# Patient Record
Sex: FEMALE | Race: WHITE | Employment: FULL TIME | URBAN - NONMETROPOLITAN AREA
[De-identification: names, ages, dates, MRNs, and addresses within clinical notes are randomized per-mention and may not be internally consistent; named-entity substitution may affect disease eponyms.]

---

## 2018-03-06 ENCOUNTER — HOSPITAL ENCOUNTER (EMERGENCY)
Age: 38
Discharge: HOME | End: 2018-03-06
Attending: EMERGENCY MEDICINE
Payer: MEDICAID

## 2018-03-06 ENCOUNTER — APPOINTMENT (OUTPATIENT)
Dept: CT IMAGING | Age: 38
End: 2018-03-06
Payer: MEDICAID

## 2018-03-06 VITALS
OXYGEN SATURATION: 97 % | DIASTOLIC BLOOD PRESSURE: 84 MMHG | TEMPERATURE: 98.7 F | HEART RATE: 64 BPM | WEIGHT: 106 LBS | RESPIRATION RATE: 12 BRPM | SYSTOLIC BLOOD PRESSURE: 131 MMHG

## 2018-03-06 DIAGNOSIS — R51.9 ACUTE NONINTRACTABLE HEADACHE, UNSPECIFIED HEADACHE TYPE: ICD-10-CM

## 2018-03-06 DIAGNOSIS — R11.2 NON-INTRACTABLE VOMITING WITH NAUSEA, UNSPECIFIED VOMITING TYPE: ICD-10-CM

## 2018-03-06 DIAGNOSIS — A09 DIARRHEA OF INFECTIOUS ORIGIN: Primary | ICD-10-CM

## 2018-03-06 LAB
-: ABNORMAL
ABSOLUTE EOS #: <0.03 K/UL (ref 0–0.44)
ABSOLUTE IMMATURE GRANULOCYTE: 0.05 K/UL (ref 0–0.3)
ABSOLUTE LYMPH #: 1.37 K/UL (ref 1.1–3.7)
ABSOLUTE MONO #: 0.6 K/UL (ref 0.1–1.2)
ALBUMIN SERPL-MCNC: 4.9 G/DL (ref 3.5–5.2)
ALBUMIN/GLOBULIN RATIO: 1.5 (ref 1–2.5)
ALP BLD-CCNC: 66 U/L (ref 35–104)
ALT SERPL-CCNC: 12 U/L (ref 5–33)
AMORPHOUS: ABNORMAL
AMPHETAMINE SCREEN URINE: NEGATIVE
ANION GAP SERPL CALCULATED.3IONS-SCNC: 15 MMOL/L (ref 9–17)
AST SERPL-CCNC: 23 U/L
BACTERIA: ABNORMAL
BARBITURATE SCREEN URINE: NEGATIVE
BASOPHILS # BLD: 0 % (ref 0–2)
BASOPHILS ABSOLUTE: 0.04 K/UL (ref 0–0.2)
BENZODIAZEPINE SCREEN, URINE: NEGATIVE
BILIRUB SERPL-MCNC: 1.08 MG/DL (ref 0.3–1.2)
BILIRUBIN URINE: NEGATIVE
BUN BLDV-MCNC: 16 MG/DL (ref 6–20)
BUN/CREAT BLD: 35 (ref 9–20)
BUPRENORPHINE URINE: NEGATIVE
CALCIUM SERPL-MCNC: 10.3 MG/DL (ref 8.6–10.4)
CANNABINOID SCREEN URINE: NEGATIVE
CASTS UA: ABNORMAL /LPF
CHLORIDE BLD-SCNC: 103 MMOL/L (ref 98–107)
CO2: 24 MMOL/L (ref 20–31)
COCAINE METABOLITE, URINE: NEGATIVE
COLOR: YELLOW
COMMENT UA: ABNORMAL
CREAT SERPL-MCNC: 0.46 MG/DL (ref 0.5–0.9)
CRYSTALS, UA: ABNORMAL /HPF
DIFFERENTIAL TYPE: ABNORMAL
EOSINOPHILS RELATIVE PERCENT: 0 % (ref 1–4)
EPITHELIAL CELLS UA: ABNORMAL /HPF (ref 0–25)
GFR AFRICAN AMERICAN: >60 ML/MIN
GFR NON-AFRICAN AMERICAN: >60 ML/MIN
GFR SERPL CREATININE-BSD FRML MDRD: ABNORMAL ML/MIN/{1.73_M2}
GFR SERPL CREATININE-BSD FRML MDRD: ABNORMAL ML/MIN/{1.73_M2}
GLUCOSE BLD-MCNC: 121 MG/DL (ref 70–99)
GLUCOSE URINE: NEGATIVE
HCG QUALITATIVE: NEGATIVE
HCT VFR BLD CALC: 50.5 % (ref 36.3–47.1)
HEMOGLOBIN: 16.6 G/DL (ref 11.9–15.1)
IMMATURE GRANULOCYTES: 0 %
KETONES, URINE: NEGATIVE
LEUKOCYTE ESTERASE, URINE: NEGATIVE
LIPASE: 64 U/L (ref 13–60)
LYMPHOCYTES # BLD: 12 % (ref 24–43)
MCH RBC QN AUTO: 28.3 PG (ref 25.2–33.5)
MCHC RBC AUTO-ENTMCNC: 32.9 G/DL (ref 28.4–34.8)
MCV RBC AUTO: 86 FL (ref 82.6–102.9)
MDMA URINE: ABNORMAL
METHADONE SCREEN, URINE: NEGATIVE
METHAMPHETAMINE, URINE: NEGATIVE
MONOCYTES # BLD: 5 % (ref 3–12)
MUCUS: ABNORMAL
NITRITE, URINE: NEGATIVE
NRBC AUTOMATED: 0 PER 100 WBC
OPIATES, URINE: POSITIVE
OTHER OBSERVATIONS UA: ABNORMAL
OXYCODONE SCREEN URINE: NEGATIVE
PDW BLD-RTO: 14.8 % (ref 11.8–14.4)
PH UA: 5.5 (ref 5–9)
PHENCYCLIDINE, URINE: NEGATIVE
PLATELET # BLD: 400 K/UL (ref 138–453)
PLATELET ESTIMATE: ABNORMAL
PMV BLD AUTO: 9.3 FL (ref 8.1–13.5)
POTASSIUM SERPL-SCNC: 3.8 MMOL/L (ref 3.7–5.3)
PROPOXYPHENE, URINE: NEGATIVE
PROTEIN UA: NEGATIVE
RBC # BLD: 5.87 M/UL (ref 3.95–5.11)
RBC # BLD: ABNORMAL 10*6/UL
RBC UA: ABNORMAL /HPF (ref 0–2)
RENAL EPITHELIAL, UA: ABNORMAL /HPF
SEG NEUTROPHILS: 83 % (ref 36–65)
SEGMENTED NEUTROPHILS ABSOLUTE COUNT: 9.77 K/UL (ref 1.5–8.1)
SODIUM BLD-SCNC: 142 MMOL/L (ref 135–144)
SPECIFIC GRAVITY UA: <1.005 (ref 1.01–1.02)
TEST INFORMATION: ABNORMAL
TOTAL PROTEIN: 8.2 G/DL (ref 6.4–8.3)
TRICHOMONAS: ABNORMAL
TRICYCLIC ANTIDEPRESSANTS, UR: NEGATIVE
TURBIDITY: CLEAR
URINE HGB: NEGATIVE
UROBILINOGEN, URINE: NORMAL
WBC # BLD: 11.8 K/UL (ref 3.5–11.3)
WBC # BLD: ABNORMAL 10*3/UL
WBC UA: ABNORMAL /HPF (ref 0–5)
YEAST: ABNORMAL

## 2018-03-06 PROCEDURE — 6360000002 HC RX W HCPCS: Performed by: EMERGENCY MEDICINE

## 2018-03-06 PROCEDURE — 96361 HYDRATE IV INFUSION ADD-ON: CPT

## 2018-03-06 PROCEDURE — 80306 DRUG TEST PRSMV INSTRMNT: CPT

## 2018-03-06 PROCEDURE — 96372 THER/PROPH/DIAG INJ SC/IM: CPT

## 2018-03-06 PROCEDURE — 84703 CHORIONIC GONADOTROPIN ASSAY: CPT

## 2018-03-06 PROCEDURE — 96374 THER/PROPH/DIAG INJ IV PUSH: CPT

## 2018-03-06 PROCEDURE — 74177 CT ABD & PELVIS W/CONTRAST: CPT

## 2018-03-06 PROCEDURE — 83690 ASSAY OF LIPASE: CPT

## 2018-03-06 PROCEDURE — 81001 URINALYSIS AUTO W/SCOPE: CPT

## 2018-03-06 PROCEDURE — 36415 COLL VENOUS BLD VENIPUNCTURE: CPT

## 2018-03-06 PROCEDURE — 6360000004 HC RX CONTRAST MEDICATION: Performed by: EMERGENCY MEDICINE

## 2018-03-06 PROCEDURE — 85025 COMPLETE CBC W/AUTO DIFF WBC: CPT

## 2018-03-06 PROCEDURE — 80053 COMPREHEN METABOLIC PANEL: CPT

## 2018-03-06 PROCEDURE — 2580000003 HC RX 258: Performed by: EMERGENCY MEDICINE

## 2018-03-06 PROCEDURE — 96375 TX/PRO/DX INJ NEW DRUG ADDON: CPT

## 2018-03-06 PROCEDURE — 99284 EMERGENCY DEPT VISIT MOD MDM: CPT

## 2018-03-06 RX ORDER — KETOROLAC TROMETHAMINE 15 MG/ML
15 INJECTION, SOLUTION INTRAMUSCULAR; INTRAVENOUS ONCE
Status: COMPLETED | OUTPATIENT
Start: 2018-03-06 | End: 2018-03-06

## 2018-03-06 RX ORDER — M-VIT,TX,IRON,MINS/CALC/FOLIC 27MG-0.4MG
1 TABLET ORAL DAILY
COMMUNITY

## 2018-03-06 RX ORDER — ORPHENADRINE CITRATE 30 MG/ML
30 INJECTION INTRAMUSCULAR; INTRAVENOUS ONCE
Status: COMPLETED | OUTPATIENT
Start: 2018-03-06 | End: 2018-03-06

## 2018-03-06 RX ORDER — ONDANSETRON 2 MG/ML
4 INJECTION INTRAMUSCULAR; INTRAVENOUS ONCE
Status: COMPLETED | OUTPATIENT
Start: 2018-03-06 | End: 2018-03-06

## 2018-03-06 RX ORDER — DICYCLOMINE HYDROCHLORIDE 10 MG/ML
20 INJECTION INTRAMUSCULAR ONCE
Status: COMPLETED | OUTPATIENT
Start: 2018-03-06 | End: 2018-03-06

## 2018-03-06 RX ORDER — DIPHENHYDRAMINE HYDROCHLORIDE 50 MG/ML
25 INJECTION INTRAMUSCULAR; INTRAVENOUS ONCE
Status: COMPLETED | OUTPATIENT
Start: 2018-03-06 | End: 2018-03-06

## 2018-03-06 RX ORDER — DEXAMETHASONE SODIUM PHOSPHATE 10 MG/ML
6 INJECTION, SOLUTION INTRAMUSCULAR; INTRAVENOUS ONCE
Status: COMPLETED | OUTPATIENT
Start: 2018-03-06 | End: 2018-03-06

## 2018-03-06 RX ADMIN — KETOROLAC TROMETHAMINE 15 MG: 15 INJECTION, SOLUTION INTRAMUSCULAR; INTRAVENOUS at 13:40

## 2018-03-06 RX ADMIN — KETOROLAC TROMETHAMINE 15 MG: 15 INJECTION, SOLUTION INTRAMUSCULAR; INTRAVENOUS at 11:33

## 2018-03-06 RX ADMIN — DICYCLOMINE HYDROCHLORIDE 20 MG: 20 INJECTION, SOLUTION INTRAMUSCULAR at 11:33

## 2018-03-06 RX ADMIN — DIPHENHYDRAMINE HYDROCHLORIDE 25 MG: 50 INJECTION, SOLUTION INTRAMUSCULAR; INTRAVENOUS at 11:33

## 2018-03-06 RX ADMIN — ONDANSETRON 4 MG: 2 INJECTION INTRAMUSCULAR; INTRAVENOUS at 13:40

## 2018-03-06 RX ADMIN — DEXAMETHASONE SODIUM PHOSPHATE 6 MG: 10 INJECTION, SOLUTION INTRAMUSCULAR; INTRAVENOUS at 11:33

## 2018-03-06 RX ADMIN — IOPAMIDOL 100 ML: 612 INJECTION, SOLUTION INTRAVENOUS at 12:52

## 2018-03-06 RX ADMIN — PROCHLORPERAZINE EDISYLATE 10 MG: 5 INJECTION INTRAMUSCULAR; INTRAVENOUS at 11:33

## 2018-03-06 RX ADMIN — SODIUM CHLORIDE 999 ML: 9 INJECTION, SOLUTION INTRAVENOUS at 11:33

## 2018-03-06 RX ADMIN — ORPHENADRINE CITRATE 30 MG: 30 INJECTION INTRAMUSCULAR; INTRAVENOUS at 13:40

## 2018-03-06 ASSESSMENT — PAIN DESCRIPTION - LOCATION
LOCATION: HEAD
LOCATION: ABDOMEN;HEAD

## 2018-03-06 ASSESSMENT — ENCOUNTER SYMPTOMS
VOMITING: 1
SINUS PRESSURE: 1
SORE THROAT: 0
PHOTOPHOBIA: 1
CHOKING: 0
TROUBLE SWALLOWING: 0
ANAL BLEEDING: 0
EYE PAIN: 0
CONSTIPATION: 0
COLOR CHANGE: 0
WHEEZING: 0
COUGH: 0
BLOOD IN STOOL: 0
SHORTNESS OF BREATH: 0
ABDOMINAL PAIN: 1
NAUSEA: 1
SINUS PAIN: 1
DIARRHEA: 1
ABDOMINAL DISTENTION: 0
BACK PAIN: 0
FACIAL SWELLING: 0

## 2018-03-06 ASSESSMENT — PAIN SCALES - GENERAL
PAINLEVEL_OUTOF10: 10
PAINLEVEL_OUTOF10: 3

## 2018-03-06 ASSESSMENT — PAIN DESCRIPTION - DESCRIPTORS: DESCRIPTORS: ACHING;DISCOMFORT

## 2018-03-06 ASSESSMENT — PAIN DESCRIPTION - PAIN TYPE: TYPE: ACUTE PAIN

## 2018-03-06 NOTE — ED PROVIDER NOTES
note were completed with a voice recognition program.  Efforts were made to edit the dictations but occasionally words are mis-transcribed. )       Colleen Mccullough MD  03/06/18 3350

## 2018-03-07 ENCOUNTER — HOSPITAL ENCOUNTER (EMERGENCY)
Age: 38
Discharge: HOME | End: 2018-03-07
Attending: EMERGENCY MEDICINE
Payer: MEDICAID

## 2018-03-07 VITALS
SYSTOLIC BLOOD PRESSURE: 140 MMHG | DIASTOLIC BLOOD PRESSURE: 97 MMHG | HEART RATE: 55 BPM | TEMPERATURE: 99.1 F | OXYGEN SATURATION: 98 % | RESPIRATION RATE: 14 BRPM

## 2018-03-07 DIAGNOSIS — K52.9 GASTROENTERITIS: Primary | ICD-10-CM

## 2018-03-07 PROCEDURE — 96374 THER/PROPH/DIAG INJ IV PUSH: CPT

## 2018-03-07 PROCEDURE — 6360000002 HC RX W HCPCS

## 2018-03-07 PROCEDURE — 6370000000 HC RX 637 (ALT 250 FOR IP): Performed by: EMERGENCY MEDICINE

## 2018-03-07 PROCEDURE — 99283 EMERGENCY DEPT VISIT LOW MDM: CPT

## 2018-03-07 PROCEDURE — 6360000002 HC RX W HCPCS: Performed by: EMERGENCY MEDICINE

## 2018-03-07 PROCEDURE — 96372 THER/PROPH/DIAG INJ SC/IM: CPT

## 2018-03-07 PROCEDURE — 2580000003 HC RX 258: Performed by: EMERGENCY MEDICINE

## 2018-03-07 PROCEDURE — 96375 TX/PRO/DX INJ NEW DRUG ADDON: CPT

## 2018-03-07 RX ORDER — KETOROLAC TROMETHAMINE 15 MG/ML
15 INJECTION, SOLUTION INTRAMUSCULAR; INTRAVENOUS ONCE
Status: COMPLETED | OUTPATIENT
Start: 2018-03-07 | End: 2018-03-07

## 2018-03-07 RX ORDER — DICYCLOMINE HYDROCHLORIDE 10 MG/ML
20 INJECTION INTRAMUSCULAR ONCE
Status: COMPLETED | OUTPATIENT
Start: 2018-03-07 | End: 2018-03-07

## 2018-03-07 RX ORDER — ONDANSETRON 4 MG/1
8 TABLET, ORALLY DISINTEGRATING ORAL ONCE
Status: DISCONTINUED | OUTPATIENT
Start: 2018-03-07 | End: 2018-03-07 | Stop reason: HOSPADM

## 2018-03-07 RX ORDER — DIPHENHYDRAMINE HYDROCHLORIDE 50 MG/ML
25 INJECTION INTRAMUSCULAR; INTRAVENOUS ONCE
Status: COMPLETED | OUTPATIENT
Start: 2018-03-07 | End: 2018-03-07

## 2018-03-07 RX ORDER — LOPERAMIDE HYDROCHLORIDE 2 MG/1
4 CAPSULE ORAL ONCE
Status: COMPLETED | OUTPATIENT
Start: 2018-03-07 | End: 2018-03-07

## 2018-03-07 RX ORDER — ONDANSETRON 4 MG/1
4 TABLET, ORALLY DISINTEGRATING ORAL EVERY 8 HOURS PRN
Qty: 20 TABLET | Refills: 0 | Status: SHIPPED | OUTPATIENT
Start: 2018-03-07 | End: 2018-06-07 | Stop reason: ALTCHOICE

## 2018-03-07 RX ORDER — ONDANSETRON 2 MG/ML
4 INJECTION INTRAMUSCULAR; INTRAVENOUS ONCE
Status: COMPLETED | OUTPATIENT
Start: 2018-03-07 | End: 2018-03-07

## 2018-03-07 RX ORDER — METOCLOPRAMIDE HYDROCHLORIDE 5 MG/ML
10 INJECTION INTRAMUSCULAR; INTRAVENOUS ONCE
Status: COMPLETED | OUTPATIENT
Start: 2018-03-07 | End: 2018-03-07

## 2018-03-07 RX ORDER — DICYCLOMINE HYDROCHLORIDE 10 MG/1
10 CAPSULE ORAL EVERY 6 HOURS PRN
Qty: 20 CAPSULE | Refills: 0 | Status: SHIPPED | OUTPATIENT
Start: 2018-03-07 | End: 2018-06-07 | Stop reason: ALTCHOICE

## 2018-03-07 RX ORDER — SODIUM CHLORIDE, SODIUM LACTATE, POTASSIUM CHLORIDE, CALCIUM CHLORIDE 600; 310; 30; 20 MG/100ML; MG/100ML; MG/100ML; MG/100ML
1000 INJECTION, SOLUTION INTRAVENOUS ONCE
Status: COMPLETED | OUTPATIENT
Start: 2018-03-07 | End: 2018-03-07

## 2018-03-07 RX ORDER — PROMETHAZINE HYDROCHLORIDE 25 MG/ML
12.5 INJECTION, SOLUTION INTRAMUSCULAR; INTRAVENOUS ONCE
Status: COMPLETED | OUTPATIENT
Start: 2018-03-07 | End: 2018-03-07

## 2018-03-07 RX ADMIN — PROMETHAZINE HYDROCHLORIDE 12.5 MG: 25 INJECTION INTRAMUSCULAR; INTRAVENOUS at 03:08

## 2018-03-07 RX ADMIN — DICYCLOMINE HYDROCHLORIDE 20 MG: 20 INJECTION, SOLUTION INTRAMUSCULAR at 02:02

## 2018-03-07 RX ADMIN — LOPERAMIDE HYDROCHLORIDE 4 MG: 2 CAPSULE ORAL at 03:08

## 2018-03-07 RX ADMIN — METOCLOPRAMIDE 10 MG: 5 INJECTION, SOLUTION INTRAMUSCULAR; INTRAVENOUS at 02:03

## 2018-03-07 RX ADMIN — DIPHENHYDRAMINE HYDROCHLORIDE 25 MG: 50 INJECTION, SOLUTION INTRAMUSCULAR; INTRAVENOUS at 02:03

## 2018-03-07 RX ADMIN — ONDANSETRON 4 MG: 2 INJECTION INTRAMUSCULAR; INTRAVENOUS at 02:31

## 2018-03-07 RX ADMIN — SODIUM CHLORIDE, POTASSIUM CHLORIDE, SODIUM LACTATE AND CALCIUM CHLORIDE 1000 ML: 600; 310; 30; 20 INJECTION, SOLUTION INTRAVENOUS at 02:02

## 2018-03-07 RX ADMIN — KETOROLAC TROMETHAMINE 15 MG: 15 INJECTION, SOLUTION INTRAMUSCULAR; INTRAVENOUS at 02:02

## 2018-03-07 ASSESSMENT — ENCOUNTER SYMPTOMS
COUGH: 0
BACK PAIN: 0
SHORTNESS OF BREATH: 0
FACIAL SWELLING: 0
DIARRHEA: 1
VOMITING: 1
ABDOMINAL PAIN: 1
NAUSEA: 1

## 2018-03-07 ASSESSMENT — PAIN DESCRIPTION - LOCATION: LOCATION: ABDOMEN

## 2018-03-07 ASSESSMENT — PAIN SCALES - GENERAL: PAINLEVEL_OUTOF10: 9

## 2018-03-07 ASSESSMENT — PAIN DESCRIPTION - PAIN TYPE: TYPE: ACUTE PAIN

## 2018-03-07 ASSESSMENT — PAIN DESCRIPTION - DESCRIPTORS: DESCRIPTORS: CRAMPING

## 2018-03-07 NOTE — ED PROVIDER NOTES
Cardiovascular: Normal rate and regular rhythm. No murmur heard. Pulmonary/Chest: Effort normal and breath sounds normal. No stridor. No respiratory distress. Abdominal: Soft. She exhibits no distension. Bowel sounds are increased. There is generalized tenderness. There is no rigidity, no rebound, no guarding, no tenderness at McBurney's point and negative Sapp's sign. Musculoskeletal: Normal range of motion. Neurological: She is alert and oriented to person, place, and time. No cranial nerve deficit. Skin: Skin is warm and dry. Nursing note and vitals reviewed. DIAGNOSTIC RESULTS     EKG: All EKG's are interpreted by the Emergency Department Physician who either signs or Co-signs this chart in the absence of a cardiologist.        RADIOLOGY:   Non-plain film images such as CT, Ultrasound and MRI are read by the radiologist. Plain radiographic images are visualized and preliminarily interpreted by the emergency physician with the below findings:        Interpretation per the Radiologist below, if available at the time of this note:    No orders to display         ED BEDSIDE ULTRASOUND:   Performed by ED Physician - none    LABS:  Labs Reviewed - No data to display    All other labs were within normal range or not returned as of this dictation. EMERGENCY DEPARTMENT COURSE and DIFFERENTIAL DIAGNOSIS/MDM:   Vitals:    Vitals:    03/07/18 0137   BP: (!) 140/97   Pulse: 55   Resp: 14   Temp: 99.1 °F (37.3 °C)   TempSrc: Tympanic   SpO2: 98%       The patient has a benign abdomen. I did review her workup from earlier. She has just had persistent symptoms, but no new symptoms. I do not feel that further testing will be of benefit at this time. Patient was given fluids, antiemetics, and antispasmodics. She did have some improvement of her symptoms but had persistent nausea. Her antiemetics were redosed. The patient was finally able to get relief.   I will prescribe Bentyl and Zofran for her for symptom control. She will continue Imodium at home. She continues to have a nontender abdomen. The patient will be discharged. MDM    CRITICAL CARE TIME   Total Critical Care time was minutes, excluding separately reportable procedures. There was a high probability of clinically significant/life threatening deterioration in the patient's condition which required my urgent intervention. CONSULTS:  None    PROCEDURES:  Unless otherwise noted below, none     Procedures    FINAL IMPRESSION      1. Gastroenteritis          DISPOSITION/PLAN   DISPOSITION Decision To Discharge 03/07/2018 03:36:16 AM      PATIENT REFERRED TO:  [facility] MD  [address] [place] [address]  [phone]  In 1 week        DISCHARGE MEDICATIONS:  New Prescriptions    DICYCLOMINE (BENTYL) 10 MG CAPSULE    Take 1 capsule by mouth every 6 hours as needed (cramps)    ONDANSETRON (ZOFRAN ODT) 4 MG DISINTEGRATING TABLET    Take 1 tablet by mouth every 8 hours as needed for Nausea          (Please note that portions of this note were completed with a voice recognition program.  Efforts were made to edit the dictations but occasionally words are mis-transcribed. )    Joanie Boeck, MD (electronically signed)  Attending Emergency Physician          Joanie Boeck, MD  03/07/18 7417

## 2018-04-03 ENCOUNTER — HOSPITAL ENCOUNTER (EMERGENCY)
Age: 38
Discharge: HOME | End: 2018-04-03
Payer: COMMERCIAL

## 2018-04-03 VITALS
HEART RATE: 87 BPM | TEMPERATURE: 97 F | RESPIRATION RATE: 18 BRPM | OXYGEN SATURATION: 97 % | SYSTOLIC BLOOD PRESSURE: 134 MMHG | DIASTOLIC BLOOD PRESSURE: 95 MMHG

## 2018-04-03 DIAGNOSIS — K02.9 DENTAL CARIES: ICD-10-CM

## 2018-04-03 DIAGNOSIS — K04.7 DENTAL ABSCESS: Primary | ICD-10-CM

## 2018-04-03 PROCEDURE — 99282 EMERGENCY DEPT VISIT SF MDM: CPT

## 2018-04-03 RX ORDER — IBUPROFEN 600 MG/1
600 TABLET ORAL EVERY 6 HOURS PRN
Qty: 21 TABLET | Refills: 0 | Status: SHIPPED | OUTPATIENT
Start: 2018-04-03 | End: 2018-08-22 | Stop reason: ALTCHOICE

## 2018-04-03 RX ORDER — PENICILLIN V POTASSIUM 500 MG/1
500 TABLET ORAL 4 TIMES DAILY
Qty: 40 TABLET | Refills: 0 | Status: SHIPPED | OUTPATIENT
Start: 2018-04-03 | End: 2018-04-13

## 2018-04-03 ASSESSMENT — ENCOUNTER SYMPTOMS
CHEST TIGHTNESS: 0
RHINORRHEA: 0
NAUSEA: 0
VOMITING: 0
COUGH: 0
EYE REDNESS: 0
SHORTNESS OF BREATH: 0
WHEEZING: 0
SORE THROAT: 0
BACK PAIN: 0
DIARRHEA: 0
BLOOD IN STOOL: 0
EYE DISCHARGE: 0
ABDOMINAL PAIN: 0
CONSTIPATION: 0

## 2018-04-03 ASSESSMENT — PAIN DESCRIPTION - PAIN TYPE: TYPE: ACUTE PAIN

## 2018-04-03 ASSESSMENT — PAIN DESCRIPTION - LOCATION: LOCATION: TEETH

## 2018-04-03 ASSESSMENT — PAIN SCALES - GENERAL: PAINLEVEL_OUTOF10: 9

## 2018-06-01 ENCOUNTER — HOSPITAL ENCOUNTER (EMERGENCY)
Age: 38
Discharge: HOME | End: 2018-06-01
Attending: EMERGENCY MEDICINE
Payer: COMMERCIAL

## 2018-06-01 VITALS
OXYGEN SATURATION: 98 % | RESPIRATION RATE: 18 BRPM | HEART RATE: 101 BPM | SYSTOLIC BLOOD PRESSURE: 111 MMHG | DIASTOLIC BLOOD PRESSURE: 73 MMHG | TEMPERATURE: 97.9 F | BODY MASS INDEX: 20.83 KG/M2 | WEIGHT: 125 LBS | HEIGHT: 65 IN

## 2018-06-01 DIAGNOSIS — B34.1 COXSACKIE VIRUS INFECTION: ICD-10-CM

## 2018-06-01 DIAGNOSIS — M79.10 MYALGIA: ICD-10-CM

## 2018-06-01 DIAGNOSIS — G43.909 MIGRAINE WITHOUT STATUS MIGRAINOSUS, NOT INTRACTABLE, UNSPECIFIED MIGRAINE TYPE: ICD-10-CM

## 2018-06-01 DIAGNOSIS — J02.9 ACUTE PHARYNGITIS, UNSPECIFIED ETIOLOGY: Primary | ICD-10-CM

## 2018-06-01 LAB
ABSOLUTE EOS #: 0.08 K/UL (ref 0–0.44)
ABSOLUTE IMMATURE GRANULOCYTE: 0 K/UL (ref 0–0.3)
ABSOLUTE LYMPH #: 0.24 K/UL (ref 1.1–3.7)
ABSOLUTE MONO #: 0.08 K/UL (ref 0.1–1.2)
ANION GAP SERPL CALCULATED.3IONS-SCNC: 13 MMOL/L (ref 9–17)
BASOPHILS # BLD: 0 % (ref 0–2)
BASOPHILS ABSOLUTE: 0 K/UL (ref 0–0.2)
BUN BLDV-MCNC: 9 MG/DL (ref 6–20)
BUN/CREAT BLD: 18 (ref 9–20)
CALCIUM SERPL-MCNC: 9.2 MG/DL (ref 8.6–10.4)
CHLORIDE BLD-SCNC: 102 MMOL/L (ref 98–107)
CO2: 23 MMOL/L (ref 20–31)
CREAT SERPL-MCNC: 0.49 MG/DL (ref 0.5–0.9)
DIFFERENTIAL TYPE: ABNORMAL
DIRECT EXAM: NORMAL
EOSINOPHILS RELATIVE PERCENT: 1 % (ref 1–4)
GFR AFRICAN AMERICAN: >60 ML/MIN
GFR NON-AFRICAN AMERICAN: >60 ML/MIN
GFR SERPL CREATININE-BSD FRML MDRD: ABNORMAL ML/MIN/{1.73_M2}
GFR SERPL CREATININE-BSD FRML MDRD: ABNORMAL ML/MIN/{1.73_M2}
GLUCOSE BLD-MCNC: 104 MG/DL (ref 70–99)
HCT VFR BLD CALC: 38.4 % (ref 36.3–47.1)
HEMOGLOBIN: 13.4 G/DL (ref 11.9–15.1)
IMMATURE GRANULOCYTES: 0 %
LYMPHOCYTES # BLD: 3 % (ref 24–43)
Lab: NORMAL
Lab: NORMAL
MCH RBC QN AUTO: 30.7 PG (ref 25.2–33.5)
MCHC RBC AUTO-ENTMCNC: 34.9 G/DL (ref 28.4–34.8)
MCV RBC AUTO: 87.9 FL (ref 82.6–102.9)
MONOCYTES # BLD: 1 % (ref 3–12)
MORPHOLOGY: NORMAL
NRBC AUTOMATED: 0 PER 100 WBC
PDW BLD-RTO: 12.3 % (ref 11.8–14.4)
PLATELET # BLD: 286 K/UL (ref 138–453)
PLATELET ESTIMATE: ABNORMAL
PMV BLD AUTO: 9.1 FL (ref 8.1–13.5)
POTASSIUM SERPL-SCNC: 3.4 MMOL/L (ref 3.7–5.3)
RBC # BLD: 4.37 M/UL (ref 3.95–5.11)
RBC # BLD: ABNORMAL 10*6/UL
SEG NEUTROPHILS: 95 % (ref 36–65)
SEGMENTED NEUTROPHILS ABSOLUTE COUNT: 7.7 K/UL (ref 1.5–8.1)
SODIUM BLD-SCNC: 138 MMOL/L (ref 135–144)
SPECIMEN DESCRIPTION: NORMAL
STATUS: NORMAL
STATUS: NORMAL
WBC # BLD: 8.1 K/UL (ref 3.5–11.3)
WBC # BLD: ABNORMAL 10*3/UL

## 2018-06-01 PROCEDURE — 6360000002 HC RX W HCPCS: Performed by: EMERGENCY MEDICINE

## 2018-06-01 PROCEDURE — 99283 EMERGENCY DEPT VISIT LOW MDM: CPT

## 2018-06-01 PROCEDURE — 80048 BASIC METABOLIC PNL TOTAL CA: CPT

## 2018-06-01 PROCEDURE — 85025 COMPLETE CBC W/AUTO DIFF WBC: CPT

## 2018-06-01 PROCEDURE — 87651 STREP A DNA AMP PROBE: CPT

## 2018-06-01 PROCEDURE — 36415 COLL VENOUS BLD VENIPUNCTURE: CPT

## 2018-06-01 PROCEDURE — 6370000000 HC RX 637 (ALT 250 FOR IP): Performed by: EMERGENCY MEDICINE

## 2018-06-01 PROCEDURE — 2580000003 HC RX 258: Performed by: EMERGENCY MEDICINE

## 2018-06-01 PROCEDURE — 96365 THER/PROPH/DIAG IV INF INIT: CPT

## 2018-06-01 PROCEDURE — 96375 TX/PRO/DX INJ NEW DRUG ADDON: CPT

## 2018-06-01 RX ORDER — MIDAZOLAM HYDROCHLORIDE 2 MG/ML
5 SYRUP ORAL ONCE
Status: DISCONTINUED | OUTPATIENT
Start: 2018-06-01 | End: 2018-06-01

## 2018-06-01 RX ORDER — AZITHROMYCIN 250 MG/1
TABLET, FILM COATED ORAL
Qty: 6 TABLET | Refills: 0 | Status: SHIPPED | OUTPATIENT
Start: 2018-06-01 | End: 2018-06-07 | Stop reason: ALTCHOICE

## 2018-06-01 RX ORDER — METHYLPREDNISOLONE SODIUM SUCCINATE 125 MG/2ML
125 INJECTION, POWDER, LYOPHILIZED, FOR SOLUTION INTRAMUSCULAR; INTRAVENOUS ONCE
Status: COMPLETED | OUTPATIENT
Start: 2018-06-01 | End: 2018-06-01

## 2018-06-01 RX ORDER — KETOROLAC TROMETHAMINE 15 MG/ML
15 INJECTION, SOLUTION INTRAMUSCULAR; INTRAVENOUS ONCE
Status: COMPLETED | OUTPATIENT
Start: 2018-06-01 | End: 2018-06-01

## 2018-06-01 RX ORDER — DIPHENHYDRAMINE HYDROCHLORIDE 50 MG/ML
25 INJECTION INTRAMUSCULAR; INTRAVENOUS ONCE
Status: COMPLETED | OUTPATIENT
Start: 2018-06-01 | End: 2018-06-01

## 2018-06-01 RX ORDER — HYDROCODONE BITARTRATE AND ACETAMINOPHEN 5; 325 MG/1; MG/1
1 TABLET ORAL EVERY 4 HOURS PRN
Qty: 10 TABLET | Refills: 0 | Status: SHIPPED | OUTPATIENT
Start: 2018-06-01 | End: 2018-06-04 | Stop reason: ALTCHOICE

## 2018-06-01 RX ORDER — PREDNISONE 20 MG/1
20 TABLET ORAL 2 TIMES DAILY
Qty: 14 TABLET | Refills: 0 | Status: SHIPPED | OUTPATIENT
Start: 2018-06-01 | End: 2018-06-07

## 2018-06-01 RX ORDER — ACETAMINOPHEN 500 MG
1000 TABLET ORAL ONCE
Status: COMPLETED | OUTPATIENT
Start: 2018-06-01 | End: 2018-06-01

## 2018-06-01 RX ADMIN — PROCHLORPERAZINE EDISYLATE 10 MG: 5 INJECTION INTRAMUSCULAR; INTRAVENOUS at 10:26

## 2018-06-01 RX ADMIN — DIPHENHYDRAMINE HYDROCHLORIDE 25 MG: 50 INJECTION, SOLUTION INTRAMUSCULAR; INTRAVENOUS at 10:29

## 2018-06-01 RX ADMIN — KETOROLAC TROMETHAMINE 15 MG: 15 INJECTION, SOLUTION INTRAMUSCULAR; INTRAVENOUS at 10:28

## 2018-06-01 RX ADMIN — SODIUM CHLORIDE 999 ML: 9 INJECTION, SOLUTION INTRAVENOUS at 10:24

## 2018-06-01 RX ADMIN — AZITHROMYCIN MONOHYDRATE 500 MG: 500 INJECTION, POWDER, LYOPHILIZED, FOR SOLUTION INTRAVENOUS at 10:36

## 2018-06-01 RX ADMIN — ACETAMINOPHEN 1000 MG: 500 TABLET ORAL at 10:22

## 2018-06-01 RX ADMIN — METHYLPREDNISOLONE SODIUM SUCCINATE 125 MG: 125 INJECTION, POWDER, FOR SOLUTION INTRAMUSCULAR; INTRAVENOUS at 10:25

## 2018-06-01 ASSESSMENT — ENCOUNTER SYMPTOMS
COLOR CHANGE: 0
SINUS PRESSURE: 1
SORE THROAT: 1
VOMITING: 0
CONSTIPATION: 0
ABDOMINAL DISTENTION: 0
DIARRHEA: 0
NAUSEA: 0
SHORTNESS OF BREATH: 0
COUGH: 0
ABDOMINAL PAIN: 0

## 2018-06-01 ASSESSMENT — PAIN DESCRIPTION - LOCATION: LOCATION: GENERALIZED

## 2018-06-01 ASSESSMENT — PAIN SCALES - GENERAL
PAINLEVEL_OUTOF10: 6
PAINLEVEL_OUTOF10: 10

## 2018-06-03 ENCOUNTER — HOSPITAL ENCOUNTER (EMERGENCY)
Age: 38
Discharge: HOME | End: 2018-06-03
Attending: EMERGENCY MEDICINE
Payer: COMMERCIAL

## 2018-06-03 VITALS
WEIGHT: 125 LBS | HEIGHT: 65 IN | BODY MASS INDEX: 20.83 KG/M2 | OXYGEN SATURATION: 99 % | RESPIRATION RATE: 16 BRPM | TEMPERATURE: 98.9 F | DIASTOLIC BLOOD PRESSURE: 91 MMHG | SYSTOLIC BLOOD PRESSURE: 139 MMHG | HEART RATE: 82 BPM

## 2018-06-03 DIAGNOSIS — R21 RASH AND OTHER NONSPECIFIC SKIN ERUPTION: Primary | ICD-10-CM

## 2018-06-03 PROCEDURE — 99282 EMERGENCY DEPT VISIT SF MDM: CPT

## 2018-06-03 PROCEDURE — 6360000002 HC RX W HCPCS: Performed by: EMERGENCY MEDICINE

## 2018-06-03 PROCEDURE — 6370000000 HC RX 637 (ALT 250 FOR IP): Performed by: EMERGENCY MEDICINE

## 2018-06-03 PROCEDURE — 96374 THER/PROPH/DIAG INJ IV PUSH: CPT

## 2018-06-03 RX ORDER — HYDROCODONE BITARTRATE AND ACETAMINOPHEN 5; 325 MG/1; MG/1
1 TABLET ORAL ONCE
Status: COMPLETED | OUTPATIENT
Start: 2018-06-03 | End: 2018-06-03

## 2018-06-03 RX ORDER — KETOROLAC TROMETHAMINE 30 MG/ML
30 INJECTION, SOLUTION INTRAMUSCULAR; INTRAVENOUS ONCE
Status: COMPLETED | OUTPATIENT
Start: 2018-06-03 | End: 2018-06-03

## 2018-06-03 RX ADMIN — KETOROLAC TROMETHAMINE 30 MG: 30 INJECTION, SOLUTION INTRAMUSCULAR at 03:43

## 2018-06-03 RX ADMIN — HYDROCODONE BITARTRATE AND ACETAMINOPHEN 1 TABLET: 5; 325 TABLET ORAL at 03:43

## 2018-06-03 ASSESSMENT — PAIN DESCRIPTION - PAIN TYPE: TYPE: ACUTE PAIN

## 2018-06-03 ASSESSMENT — PAIN DESCRIPTION - LOCATION: LOCATION: HAND;FOOT

## 2018-06-03 ASSESSMENT — PAIN SCALES - GENERAL
PAINLEVEL_OUTOF10: 5
PAINLEVEL_OUTOF10: 5
PAINLEVEL_OUTOF10: 0

## 2018-06-03 ASSESSMENT — PAIN DESCRIPTION - ORIENTATION: ORIENTATION: RIGHT;LEFT

## 2018-06-04 ENCOUNTER — OFFICE VISIT (OUTPATIENT)
Dept: PRIMARY CARE CLINIC | Age: 38
End: 2018-06-04
Payer: COMMERCIAL

## 2018-06-04 VITALS
BODY MASS INDEX: 22.13 KG/M2 | SYSTOLIC BLOOD PRESSURE: 115 MMHG | HEIGHT: 65 IN | TEMPERATURE: 98.4 F | RESPIRATION RATE: 14 BRPM | DIASTOLIC BLOOD PRESSURE: 70 MMHG | HEART RATE: 80 BPM | WEIGHT: 132.8 LBS

## 2018-06-04 DIAGNOSIS — B08.4 HAND, FOOT AND MOUTH DISEASE: Primary | ICD-10-CM

## 2018-06-04 PROCEDURE — G8427 DOCREV CUR MEDS BY ELIG CLIN: HCPCS | Performed by: NURSE PRACTITIONER

## 2018-06-04 PROCEDURE — 99202 OFFICE O/P NEW SF 15 MIN: CPT | Performed by: NURSE PRACTITIONER

## 2018-06-04 PROCEDURE — G8420 CALC BMI NORM PARAMETERS: HCPCS | Performed by: NURSE PRACTITIONER

## 2018-06-04 PROCEDURE — 1036F TOBACCO NON-USER: CPT | Performed by: NURSE PRACTITIONER

## 2018-06-04 RX ORDER — HYDROXYZINE HYDROCHLORIDE 25 MG/1
25 TABLET, FILM COATED ORAL 3 TIMES DAILY PRN
Qty: 30 TABLET | Refills: 0 | Status: SHIPPED | OUTPATIENT
Start: 2018-06-04 | End: 2018-06-14

## 2018-06-04 ASSESSMENT — ENCOUNTER SYMPTOMS
COUGH: 0
VOMITING: 0
RHINORRHEA: 0
SHORTNESS OF BREATH: 0
SORE THROAT: 0

## 2018-06-07 ENCOUNTER — OFFICE VISIT (OUTPATIENT)
Dept: PRIMARY CARE CLINIC | Age: 38
End: 2018-06-07
Payer: COMMERCIAL

## 2018-06-07 VITALS
RESPIRATION RATE: 14 BRPM | HEIGHT: 65 IN | HEART RATE: 75 BPM | DIASTOLIC BLOOD PRESSURE: 82 MMHG | WEIGHT: 133.7 LBS | BODY MASS INDEX: 22.28 KG/M2 | TEMPERATURE: 98.4 F | SYSTOLIC BLOOD PRESSURE: 129 MMHG

## 2018-06-07 DIAGNOSIS — B08.4 HAND, FOOT AND MOUTH DISEASE: Primary | ICD-10-CM

## 2018-06-07 PROCEDURE — G8427 DOCREV CUR MEDS BY ELIG CLIN: HCPCS | Performed by: NURSE PRACTITIONER

## 2018-06-07 PROCEDURE — 1036F TOBACCO NON-USER: CPT | Performed by: NURSE PRACTITIONER

## 2018-06-07 PROCEDURE — 99213 OFFICE O/P EST LOW 20 MIN: CPT | Performed by: NURSE PRACTITIONER

## 2018-06-07 PROCEDURE — G8420 CALC BMI NORM PARAMETERS: HCPCS | Performed by: NURSE PRACTITIONER

## 2018-06-07 ASSESSMENT — ENCOUNTER SYMPTOMS
SHORTNESS OF BREATH: 0
COUGH: 0
RHINORRHEA: 0
VOMITING: 0
SORE THROAT: 0

## 2018-07-11 ENCOUNTER — OFFICE VISIT (OUTPATIENT)
Dept: PRIMARY CARE CLINIC | Age: 38
End: 2018-07-11
Payer: COMMERCIAL

## 2018-07-11 VITALS
BODY MASS INDEX: 21.86 KG/M2 | SYSTOLIC BLOOD PRESSURE: 116 MMHG | HEIGHT: 66 IN | TEMPERATURE: 97.7 F | WEIGHT: 136 LBS | DIASTOLIC BLOOD PRESSURE: 75 MMHG | RESPIRATION RATE: 14 BRPM | HEART RATE: 71 BPM

## 2018-07-11 DIAGNOSIS — B09 VIRAL EXANTHEM: Primary | ICD-10-CM

## 2018-07-11 DIAGNOSIS — J02.9 SORETHROAT: ICD-10-CM

## 2018-07-11 LAB — S PYO AG THROAT QL: NORMAL

## 2018-07-11 PROCEDURE — 1036F TOBACCO NON-USER: CPT | Performed by: NURSE PRACTITIONER

## 2018-07-11 PROCEDURE — 87880 STREP A ASSAY W/OPTIC: CPT | Performed by: NURSE PRACTITIONER

## 2018-07-11 PROCEDURE — G8420 CALC BMI NORM PARAMETERS: HCPCS | Performed by: NURSE PRACTITIONER

## 2018-07-11 PROCEDURE — G8427 DOCREV CUR MEDS BY ELIG CLIN: HCPCS | Performed by: NURSE PRACTITIONER

## 2018-07-11 PROCEDURE — 99213 OFFICE O/P EST LOW 20 MIN: CPT | Performed by: NURSE PRACTITIONER

## 2018-07-11 ASSESSMENT — ENCOUNTER SYMPTOMS
RHINORRHEA: 0
SORE THROAT: 1
COUGH: 0
SHORTNESS OF BREATH: 0

## 2018-07-11 NOTE — LETTER
[address] [address]  Phone: [phone]  Fax: [facility], APRN - CNP        July 11, 2018     Patient: Serina Lay   YOB: 1980   Date of Visit: 7/11/2018       To Whom It May Concern: It is my medical opinion that Glenda Brown may return to work on 07/12/2018. If you have any questions or concerns, please don't hesitate to call.     Sincerely,        Shira Hernadez, APRN - CNP

## 2018-08-22 ENCOUNTER — HOSPITAL ENCOUNTER (EMERGENCY)
Age: 38
Discharge: HOME | End: 2018-08-22
Attending: EMERGENCY MEDICINE
Payer: COMMERCIAL

## 2018-08-22 ENCOUNTER — APPOINTMENT (OUTPATIENT)
Dept: GENERAL RADIOLOGY | Age: 38
End: 2018-08-22
Payer: COMMERCIAL

## 2018-08-22 VITALS
RESPIRATION RATE: 20 BRPM | OXYGEN SATURATION: 100 % | BODY MASS INDEX: 21.69 KG/M2 | DIASTOLIC BLOOD PRESSURE: 87 MMHG | HEART RATE: 87 BPM | SYSTOLIC BLOOD PRESSURE: 126 MMHG | WEIGHT: 135 LBS | HEIGHT: 66 IN | TEMPERATURE: 97.9 F

## 2018-08-22 DIAGNOSIS — S93.401A SPRAIN OF RIGHT ANKLE, UNSPECIFIED LIGAMENT, INITIAL ENCOUNTER: Primary | ICD-10-CM

## 2018-08-22 PROCEDURE — 73590 X-RAY EXAM OF LOWER LEG: CPT

## 2018-08-22 PROCEDURE — 73610 X-RAY EXAM OF ANKLE: CPT

## 2018-08-22 PROCEDURE — 99283 EMERGENCY DEPT VISIT LOW MDM: CPT

## 2018-08-22 PROCEDURE — 6370000000 HC RX 637 (ALT 250 FOR IP): Performed by: EMERGENCY MEDICINE

## 2018-08-22 RX ORDER — IBUPROFEN 600 MG/1
600 TABLET ORAL ONCE
Status: COMPLETED | OUTPATIENT
Start: 2018-08-22 | End: 2018-08-22

## 2018-08-22 RX ORDER — IBUPROFEN 600 MG/1
600 TABLET ORAL EVERY 6 HOURS PRN
Qty: 30 TABLET | Refills: 0 | Status: ON HOLD | OUTPATIENT
Start: 2018-08-22 | End: 2021-06-25 | Stop reason: HOSPADM

## 2018-08-22 RX ADMIN — IBUPROFEN 600 MG: 600 TABLET ORAL at 08:26

## 2018-08-22 ASSESSMENT — PAIN DESCRIPTION - PAIN TYPE
TYPE: ACUTE PAIN
TYPE: ACUTE PAIN

## 2018-08-22 ASSESSMENT — PAIN DESCRIPTION - LOCATION
LOCATION: ANKLE
LOCATION: FOOT

## 2018-08-22 ASSESSMENT — PAIN SCALES - GENERAL
PAINLEVEL_OUTOF10: 10
PAINLEVEL_OUTOF10: 7
PAINLEVEL_OUTOF10: 10

## 2018-08-22 ASSESSMENT — PAIN DESCRIPTION - ORIENTATION
ORIENTATION: RIGHT
ORIENTATION: RIGHT

## 2018-08-22 NOTE — ED PROVIDER NOTES
[facility] ED  eMERGENCY dEPARTMENT eNCOUnter      Pt Name: Zhang Mackenzie  MRN: [id number]  [place] 1980  Date of evaluation: 8/22/2018  Provider: Genaro [address]       Chief Complaint   Patient presents with    Ankle Pain     felldown 1 step. right ankle swollen         HISTORY OF PRESENT ILLNESS   (Location/Symptom, Timing/Onset, Context/Setting, Quality, Duration, Modifying Factors, Severity) Note limiting factors. HPI    Zhang Mackenzie is a 30 y.o. female who presents to the emergency department Nontoxic well-appearing female patient states half hour prior to arrival she twisted her right ankle. Ndiaye had no loss of consciousness. She had immediate pain and swelling over distal right fibula. No loss of sensation no numbness or tingling. So she came in the emergency department to be seen for this. No findings of life-threatening emergency. Patient was advised to use ice and ibuprofen and follow-up with primary care doctor    Patient did not hit her head or neck pain no other complaints    Nursing Notes were reviewed. REVIEW OF SYSTEMS    (2+ for level 4; 10+ for level 5)   Review of Systems   systems reviewed and otherwise acutely negative except as in the [address] PAST MEDICAL HISTORY   History reviewed. No pertinent past medical history. SURGICAL HISTORY       Past Surgical History:   Procedure Laterality Date    UPPER GASTROINTESTINAL ENDOSCOPY         CURRENT MEDICATIONS       Previous Medications    MULTIPLE VITAMINS-MINERALS (THERAPEUTIC MULTIVITAMIN-MINERALS) TABLET    Take 1 tablet by mouth daily       ALLERGIES     Ceclor [cefaclor]    FAMILY HISTORY     History reviewed. No pertinent family history.      SOCIAL HISTORY       Social History     Social History    Marital status: Single     Spouse name: N/A    Number of children: N/A    Years of education: N/A     Social History Main Topics    Smoking status: Former Smoker Types: Cigarettes    Smokeless tobacco: Never Used    Alcohol use No    Drug use: No    Sexual activity: Not Asked     Other Topics Concern    None     Social History Narrative    None       SCREENINGS           PHYSICAL EXAM    (up to 7 for level 4, 8 or more for level 5)     ED Triage Vitals [08/22/18 0808]   BP Temp Temp Source Pulse Resp SpO2 Height Weight   126/87 97.9 °F (36.6 °C) Tympanic 87 20 100 % 5' 6\" (1.676 m) 135 lb (61.2 kg)     ED Triage Vitals [08/22/18 0808]   Enc Vitals Group      /87      Pulse 87      Resp 20      Temp 97.9 °F (36.6 °C)      Temp Source Tympanic      SpO2 100 %      Weight 135 lb (61.2 kg)      Height 5' 6\" (1.676 m)      Head Circumference       Peak Flow       Pain Score       Pain Loc       Pain Edu? Excl. in [address]? Physical Exam    GENERAL APPEARANCE: Awake and alert. Cooperative. No acute distress. HEAD: Normocephalic. Atraumatic. EXTREMITIES: Soft tissue swelling over the right lateral malleolus. Tenderness to palpitation. Full range of motion. No obvious gross deformity. No neurovascular deficit. Skin color normal.  No bruising  SKIN: Warm and dry. NEUROLOGICAL: No gross facial drooping. Moves all 4 extremities spontaneously. 2 point sensation intact no neurovascular deficit dorsal pedal posterior pulses are strong  PSYCHIATRIC: Normal mood. DIAGNOSTIC RESULTS     EKG (Per Emergency Physician):       RADIOLOGY (Per Emergency Physician): Interpretation per the Radiologist below, if available at the time of this note:  Xr Tibia Fibula Right (2 Views)    Result Date: 8/22/2018  REPORT: 3 views right ankle and 2 views right tib-fib INDICATION: Pain and swelling to ankle FINDINGS: No acute fracture or dislocation. Ankle mortise is intact and symmetric. The proximal tibia and fibula are intact. No ankle joint effusion. Lateral soft tissue swelling of the ankle.  Final report electronically signed by Rey Wang on 8/22/2018 9:15 note:  Portions of this note were completed with a voice recognition program.  Efforts were made to edit the dictations but occasionally words and phrases are mis-transcribed.)  Form v2016. J.5-cn    DO Priya Rojas (electronically signed)  Emergency Medicine Provider          Francisco Daily DO  08/22/18 0920

## 2018-12-30 ENCOUNTER — HOSPITAL ENCOUNTER (EMERGENCY)
Age: 38
Discharge: HOME | End: 2018-12-30
Attending: EMERGENCY MEDICINE
Payer: COMMERCIAL

## 2018-12-30 VITALS
HEART RATE: 101 BPM | DIASTOLIC BLOOD PRESSURE: 99 MMHG | OXYGEN SATURATION: 99 % | RESPIRATION RATE: 16 BRPM | BODY MASS INDEX: 21.69 KG/M2 | WEIGHT: 135 LBS | SYSTOLIC BLOOD PRESSURE: 161 MMHG | HEIGHT: 66 IN | TEMPERATURE: 96.5 F

## 2018-12-30 DIAGNOSIS — H57.89 IRRITATION OF LEFT EYE: Primary | ICD-10-CM

## 2018-12-30 PROCEDURE — 99282 EMERGENCY DEPT VISIT SF MDM: CPT

## 2018-12-30 PROCEDURE — 6370000000 HC RX 637 (ALT 250 FOR IP): Performed by: EMERGENCY MEDICINE

## 2018-12-30 RX ORDER — KETOROLAC TROMETHAMINE 5 MG/ML
1 SOLUTION OPHTHALMIC ONCE
Status: COMPLETED | OUTPATIENT
Start: 2018-12-30 | End: 2018-12-30

## 2018-12-30 RX ORDER — TETRACAINE HYDROCHLORIDE 5 MG/ML
1 SOLUTION OPHTHALMIC ONCE
Status: COMPLETED | OUTPATIENT
Start: 2018-12-30 | End: 2018-12-30

## 2018-12-30 RX ORDER — ERYTHROMYCIN 5 MG/G
OINTMENT OPHTHALMIC ONCE
Status: COMPLETED | OUTPATIENT
Start: 2018-12-30 | End: 2018-12-30

## 2018-12-30 RX ADMIN — TETRACAINE HYDROCHLORIDE 1 DROP: 5 SOLUTION OPHTHALMIC at 10:00

## 2018-12-30 RX ADMIN — FLUORESCEIN SODIUM 1 MG: 1 STRIP OPHTHALMIC at 10:00

## 2018-12-30 RX ADMIN — KETOROLAC TROMETHAMINE 1 DROP: 5 SOLUTION/ DROPS OPHTHALMIC at 10:30

## 2018-12-30 RX ADMIN — ERYTHROMYCIN: 5 OINTMENT OPHTHALMIC at 10:49

## 2018-12-30 ASSESSMENT — VISUAL ACUITY
OD: 20/25
OS: 20/30
OU: 20/25

## 2018-12-30 ASSESSMENT — PAIN SCALES - GENERAL
PAINLEVEL_OUTOF10: 0
PAINLEVEL_OUTOF10: 9

## 2018-12-30 ASSESSMENT — PAIN DESCRIPTION - ORIENTATION: ORIENTATION: LEFT

## 2018-12-30 ASSESSMENT — PAIN DESCRIPTION - PAIN TYPE: TYPE: ACUTE PAIN

## 2018-12-30 ASSESSMENT — PAIN DESCRIPTION - DESCRIPTORS: DESCRIPTORS: BURNING

## 2018-12-30 ASSESSMENT — PAIN DESCRIPTION - LOCATION: LOCATION: EYE

## 2019-02-28 ENCOUNTER — HOSPITAL ENCOUNTER (EMERGENCY)
Age: 39
Discharge: HOME | End: 2019-02-28
Attending: EMERGENCY MEDICINE
Payer: COMMERCIAL

## 2019-02-28 VITALS — RESPIRATION RATE: 16 BRPM

## 2019-02-28 DIAGNOSIS — H57.89 IRRITATION OF LEFT EYE: ICD-10-CM

## 2019-02-28 DIAGNOSIS — H59.89: Primary | ICD-10-CM

## 2019-02-28 PROCEDURE — 99282 EMERGENCY DEPT VISIT SF MDM: CPT

## 2019-02-28 RX ORDER — OXYCODONE AND ACETAMINOPHEN 7.5; 325 MG/1; MG/1
1 TABLET ORAL EVERY 6 HOURS PRN
Qty: 10 TABLET | Refills: 0 | Status: SHIPPED | OUTPATIENT
Start: 2019-02-28 | End: 2019-03-03

## 2019-02-28 ASSESSMENT — ENCOUNTER SYMPTOMS
EYE PAIN: 1
PHOTOPHOBIA: 1
EYE REDNESS: 1
COLOR CHANGE: 0
FACIAL SWELLING: 0

## 2019-02-28 ASSESSMENT — PAIN DESCRIPTION - ORIENTATION: ORIENTATION: LEFT

## 2019-02-28 ASSESSMENT — PAIN DESCRIPTION - LOCATION: LOCATION: EYE

## 2019-02-28 ASSESSMENT — PAIN SCALES - GENERAL: PAINLEVEL_OUTOF10: 6

## 2019-02-28 ASSESSMENT — PAIN DESCRIPTION - PAIN TYPE: TYPE: ACUTE PAIN

## 2019-03-04 ENCOUNTER — TELEPHONE (OUTPATIENT)
Dept: PRIMARY CARE CLINIC | Age: 39
End: 2019-03-04

## 2019-08-09 ENCOUNTER — HOSPITAL ENCOUNTER (EMERGENCY)
Age: 39
Discharge: HOME | End: 2019-08-09
Attending: EMERGENCY MEDICINE
Payer: COMMERCIAL

## 2019-08-09 VITALS
TEMPERATURE: 98.6 F | SYSTOLIC BLOOD PRESSURE: 124 MMHG | HEART RATE: 74 BPM | RESPIRATION RATE: 18 BRPM | DIASTOLIC BLOOD PRESSURE: 80 MMHG | OXYGEN SATURATION: 100 %

## 2019-08-09 DIAGNOSIS — K02.9 PAIN DUE TO DENTAL CARIES: Primary | ICD-10-CM

## 2019-08-09 PROCEDURE — 6360000002 HC RX W HCPCS: Performed by: EMERGENCY MEDICINE

## 2019-08-09 PROCEDURE — 6370000000 HC RX 637 (ALT 250 FOR IP): Performed by: EMERGENCY MEDICINE

## 2019-08-09 PROCEDURE — 96372 THER/PROPH/DIAG INJ SC/IM: CPT

## 2019-08-09 PROCEDURE — 99282 EMERGENCY DEPT VISIT SF MDM: CPT

## 2019-08-09 RX ORDER — CLINDAMYCIN HYDROCHLORIDE 150 MG/1
300 CAPSULE ORAL ONCE
Status: COMPLETED | OUTPATIENT
Start: 2019-08-09 | End: 2019-08-09

## 2019-08-09 RX ORDER — IBUPROFEN 800 MG/1
800 TABLET ORAL EVERY 8 HOURS PRN
Qty: 30 TABLET | Refills: 0 | Status: SHIPPED | OUTPATIENT
Start: 2019-08-09 | End: 2021-03-09

## 2019-08-09 RX ORDER — KETOROLAC TROMETHAMINE 15 MG/ML
15 INJECTION, SOLUTION INTRAMUSCULAR; INTRAVENOUS ONCE
Status: COMPLETED | OUTPATIENT
Start: 2019-08-09 | End: 2019-08-09

## 2019-08-09 RX ORDER — ACETAMINOPHEN 500 MG
1000 TABLET ORAL ONCE
Status: COMPLETED | OUTPATIENT
Start: 2019-08-09 | End: 2019-08-09

## 2019-08-09 RX ORDER — ACETAMINOPHEN 325 MG/1
650 TABLET ORAL EVERY 6 HOURS PRN
Qty: 30 TABLET | Refills: 0 | Status: SHIPPED | OUTPATIENT
Start: 2019-08-09 | End: 2021-03-09 | Stop reason: ALTCHOICE

## 2019-08-09 RX ORDER — CLINDAMYCIN HYDROCHLORIDE 300 MG/1
300 CAPSULE ORAL 3 TIMES DAILY
Qty: 21 CAPSULE | Refills: 0 | Status: SHIPPED | OUTPATIENT
Start: 2019-08-09 | End: 2019-08-16

## 2019-08-09 RX ADMIN — KETOROLAC TROMETHAMINE 15 MG: 15 INJECTION, SOLUTION INTRAMUSCULAR; INTRAVENOUS at 07:44

## 2019-08-09 RX ADMIN — CLINDAMYCIN HYDROCHLORIDE 300 MG: 150 CAPSULE ORAL at 07:44

## 2019-08-09 RX ADMIN — ACETAMINOPHEN 1000 MG: 500 TABLET, FILM COATED ORAL at 07:44

## 2019-08-09 ASSESSMENT — ENCOUNTER SYMPTOMS
TROUBLE SWALLOWING: 0
WHEEZING: 0
ABDOMINAL PAIN: 0
VOICE CHANGE: 0
NAUSEA: 0
SHORTNESS OF BREATH: 0
COLOR CHANGE: 0
VOMITING: 0

## 2019-08-09 ASSESSMENT — PAIN DESCRIPTION - FREQUENCY: FREQUENCY: CONTINUOUS

## 2019-08-09 ASSESSMENT — PAIN DESCRIPTION - ORIENTATION: ORIENTATION: LEFT;UPPER;LOWER

## 2019-08-09 ASSESSMENT — PAIN SCALES - GENERAL
PAINLEVEL_OUTOF10: 10
PAINLEVEL_OUTOF10: 9
PAINLEVEL_OUTOF10: 10

## 2019-08-09 ASSESSMENT — PAIN DESCRIPTION - LOCATION: LOCATION: TEETH

## 2019-08-09 ASSESSMENT — PAIN DESCRIPTION - DESCRIPTORS: DESCRIPTORS: CONSTANT;ACHING

## 2019-08-09 ASSESSMENT — PAIN DESCRIPTION - PAIN TYPE: TYPE: ACUTE PAIN

## 2019-08-09 NOTE — ED PROVIDER NOTES
[facility] ED  Emergency Department Encounter  EmergencyMedicine Attending     Pt Name:Isa Gaitan  MRN: [id number]  [place] 1980  Date of evaluation: 8/9/19  PCP:  Belen Hairston [address] [place], [place] [address]       Chief Complaint   Patient presents with    Dental Pain     left upper and rt lower tooth pain . Pain since monday       HISTORY OF PRESENT ILLNESS  (Location/Symptom, Timing/Onset, Context/Setting, Quality, Duration, Modifying Factors, Severity.)      Michelle Orta is a 41 y.o. female who presents with left-sided lower and upper dental pain. Patient says that she has a history of really poor dentition says that on and off she gets dental infections. ,  Comes in complaining of significant pain over the left lower in the left upper side of the jaw. No new difficulty with swallowing, tolerating p.o. intake,  no difficulty with breathing, no fevers or chills. Patient also would like a work note as she took off from work due to the dental pain today. Has not taken anything for pain so far. Reports pain with swallowing over her dentition but able to swallow. No changes in her voice. No trismus. PAST MEDICAL / SURGICAL / SOCIAL / FAMILY HISTORY      has no past medical history on file. has a past surgical history that includes Upper gastrointestinal endoscopy. Social History     Socioeconomic History    Marital status: Single     Spouse name: Not on file    Number of children: Not on file    Years of education: Not on file    Highest education level: Not on file   Occupational History    Not on file   Social Needs    Financial resource strain: Not on file    Food insecurity:     Worry: Not on file     Inability: Not on file    Transportation needs:     Medical: Not on file     Non-medical: Not on file   Tobacco Use    Smoking status: Former Smoker     Types: Cigarettes    Smokeless tobacco: Never Used   Substance and Sexual Activity    Alcohol use:  No 325 MG TABLET    Take 2 tablets by mouth every 6 hours as needed for Pain    BENZOCAINE (ORAJEL) 10 % MUCOSAL GEL    Take by mouth as needed.     CLINDAMYCIN (CLEOCIN) 300 MG CAPSULE    Take 1 capsule by mouth 3 times daily for 7 days    IBUPROFEN (ADVIL;MOTRIN) 800 MG TABLET    Take 1 tablet by mouth every 8 hours as needed for Pain       Sonny Garner MD  Emergency Medicine Attending    (Please note that portions of thisnote were completed with a voice recognition program.  Efforts were made to edit the dictations but occasionally words are mis-transcribed.)       Sonny Garner MD  08/09/19 5697

## 2019-08-10 ENCOUNTER — HOSPITAL ENCOUNTER (EMERGENCY)
Age: 39
Discharge: HOME | End: 2019-08-10
Payer: COMMERCIAL

## 2019-08-10 PROCEDURE — 4500000002 HC ER NO CHARGE

## 2019-08-12 ENCOUNTER — HOSPITAL ENCOUNTER (EMERGENCY)
Age: 39
Discharge: HOME | End: 2019-08-12
Attending: EMERGENCY MEDICINE
Payer: COMMERCIAL

## 2019-08-12 VITALS
HEART RATE: 103 BPM | DIASTOLIC BLOOD PRESSURE: 99 MMHG | BODY MASS INDEX: 23.4 KG/M2 | WEIGHT: 145 LBS | TEMPERATURE: 99.8 F | OXYGEN SATURATION: 97 % | SYSTOLIC BLOOD PRESSURE: 150 MMHG | RESPIRATION RATE: 16 BRPM

## 2019-08-12 DIAGNOSIS — K04.7 DENTAL ABSCESS: Primary | ICD-10-CM

## 2019-08-12 PROCEDURE — 6370000000 HC RX 637 (ALT 250 FOR IP): Performed by: PHYSICIAN ASSISTANT

## 2019-08-12 PROCEDURE — 99282 EMERGENCY DEPT VISIT SF MDM: CPT

## 2019-08-12 RX ORDER — LIDOCAINE HYDROCHLORIDE 20 MG/ML
15 SOLUTION OROPHARYNGEAL
Status: DISCONTINUED | OUTPATIENT
Start: 2019-08-12 | End: 2019-08-12 | Stop reason: HOSPADM

## 2019-08-12 RX ADMIN — BENZOCAINE, BUTAMBEN, AND TETRACAINE HYDROCHLORIDE 1 SPRAY: .028; .004; .004 AEROSOL, SPRAY TOPICAL at 16:07

## 2019-08-12 RX ADMIN — LIDOCAINE HYDROCHLORIDE 15 ML: 20 SOLUTION ORAL; TOPICAL at 16:07

## 2019-08-12 ASSESSMENT — ENCOUNTER SYMPTOMS
WHEEZING: 0
COUGH: 0
DIARRHEA: 0
RHINORRHEA: 0
SHORTNESS OF BREATH: 0
EYE DISCHARGE: 0
NAUSEA: 0
ABDOMINAL PAIN: 0
EYE REDNESS: 0
CONSTIPATION: 0
SORE THROAT: 0
BACK PAIN: 0
BLOOD IN STOOL: 0
CHEST TIGHTNESS: 0
VOMITING: 0

## 2019-08-12 ASSESSMENT — PAIN DESCRIPTION - LOCATION: LOCATION: MOUTH

## 2019-08-12 ASSESSMENT — PAIN SCALES - GENERAL
PAINLEVEL_OUTOF10: 10
PAINLEVEL_OUTOF10: 10

## 2019-08-12 ASSESSMENT — PAIN DESCRIPTION - PAIN TYPE: TYPE: ACUTE PAIN

## 2019-08-12 ASSESSMENT — PAIN - FUNCTIONAL ASSESSMENT: PAIN_FUNCTIONAL_ASSESSMENT: 0-10

## 2019-08-13 ENCOUNTER — TELEPHONE (OUTPATIENT)
Dept: PRIMARY CARE CLINIC | Age: 39
End: 2019-08-13

## 2019-08-13 NOTE — TELEPHONE ENCOUNTER
[facility] Transitions Initial Follow Up Call    Outreach made within 2 business days of discharge: Yes    Patient: Mabel Avitia Patient : 1980   MRN: [id number]  Reason for Admission: There are no discharge diagnoses documented for the most recent discharge. Discharge Date: 19       Spoke with:  Patient    Discharge department/facility: [facility] ER    TCM Interactive Patient Contact:  Was patient able to fill all prescriptions: Yes  Was patient instructed to bring all medications to the follow-up visit: Patient saw Oral Surgeon and feels better and does not wish to make a follow up appt here. Is patient taking all medications as directed in the discharge summary? Yes  Does patient understand their discharge instructions: Yes  Does patient have questions or concerns that need addressed prior to 7-14 day follow up office visit: no    Scheduled appointment with PCP within 7-14 days    Follow Up  No future appointments.     [facility]

## 2019-08-13 NOTE — ED PROVIDER NOTES
sterile fashion a 22-gauge needle was inserted after anesthetic was provided. There is no purulent drainage able to be expressed at this time discussed with my attending who agrees with plan to not proceed further and to have her continue follow-up tomorrow morning. Airway is patent there is no evidence of Maximus's no floor mouth abscess. She will follow-up as instructed otherwise return immediately to the ER with any new or worsening complaints. Procedures    FINAL IMPRESSION      1. Dental abscess        DISPOSITION/PLAN   DISPOSITION Decision To Discharge 08/12/2019 04:00:14 PM      PATIENT REFERRED TO:  [facility] ED  [address] [phone]    If symptoms worsen, As needed      DISCHARGE MEDICATIONS:  Discharge Medication List as of 8/12/2019  4:02 PM                 Summation      Patient Course:      ED Medications administered this visit:    Medications   butamben-tetracaine-benzocaine (CETACAINE) spray 1 spray (1 spray Topical Given 8/12/19 3377)       New Prescriptions from this visit:    Discharge Medication List as of 8/12/2019  4:02 PM          Follow-up:  [facility] ED  [address] [phone]    If symptoms worsen, As needed        Final Impression:   1.  Dental abscess               (Please note that portions of this note were completed with a voice recognition program.  Efforts were made to edit the dictations but occasionally words are mis-transcribed.)           Kyler Brown PA-C  08/12/19 3868

## 2019-11-05 ENCOUNTER — OFFICE VISIT (OUTPATIENT)
Dept: PRIMARY CARE CLINIC | Age: 39
End: 2019-11-05
Payer: COMMERCIAL

## 2019-11-05 VITALS — HEART RATE: 89 BPM | SYSTOLIC BLOOD PRESSURE: 126 MMHG | DIASTOLIC BLOOD PRESSURE: 86 MMHG | TEMPERATURE: 98.1 F

## 2019-11-05 DIAGNOSIS — R51.9 SINUS HEADACHE: ICD-10-CM

## 2019-11-05 DIAGNOSIS — J30.1 SEASONAL ALLERGIC RHINITIS DUE TO POLLEN: Primary | ICD-10-CM

## 2019-11-05 PROCEDURE — G8484 FLU IMMUNIZE NO ADMIN: HCPCS | Performed by: NURSE PRACTITIONER

## 2019-11-05 PROCEDURE — G8427 DOCREV CUR MEDS BY ELIG CLIN: HCPCS | Performed by: NURSE PRACTITIONER

## 2019-11-05 PROCEDURE — 99214 OFFICE O/P EST MOD 30 MIN: CPT | Performed by: NURSE PRACTITIONER

## 2019-11-05 PROCEDURE — 1036F TOBACCO NON-USER: CPT | Performed by: NURSE PRACTITIONER

## 2019-11-05 PROCEDURE — G8420 CALC BMI NORM PARAMETERS: HCPCS | Performed by: NURSE PRACTITIONER

## 2019-11-05 RX ORDER — MONTELUKAST SODIUM 10 MG/1
10 TABLET ORAL DAILY
Qty: 30 TABLET | Refills: 0 | Status: SHIPPED | OUTPATIENT
Start: 2019-11-05 | End: 2021-06-11

## 2019-11-05 RX ORDER — KETOROLAC TROMETHAMINE 30 MG/ML
30 INJECTION, SOLUTION INTRAMUSCULAR; INTRAVENOUS ONCE
Status: COMPLETED | OUTPATIENT
Start: 2019-11-05 | End: 2019-11-05

## 2019-11-05 RX ORDER — TRIAMCINOLONE ACETONIDE 40 MG/ML
40 INJECTION, SUSPENSION INTRA-ARTICULAR; INTRAMUSCULAR ONCE
Status: COMPLETED | OUTPATIENT
Start: 2019-11-05 | End: 2019-11-05

## 2019-11-05 RX ADMIN — KETOROLAC TROMETHAMINE 30 MG: 30 INJECTION, SOLUTION INTRAMUSCULAR; INTRAVENOUS at 11:18

## 2019-11-05 RX ADMIN — TRIAMCINOLONE ACETONIDE 40 MG: 40 INJECTION, SUSPENSION INTRA-ARTICULAR; INTRAMUSCULAR at 11:19

## 2019-11-05 ASSESSMENT — ENCOUNTER SYMPTOMS
SWOLLEN GLANDS: 0
ABDOMINAL PAIN: 0
COUGH: 1
DIARRHEA: 0
RHINORRHEA: 1
EYE PAIN: 0
PHOTOPHOBIA: 0
SORE THROAT: 1
EYE REDNESS: 0
SINUS PAIN: 1
SINUS PRESSURE: 1
VOMITING: 0
NAUSEA: 0
FACIAL SWEATING: 0
SCALP TENDERNESS: 0
WHEEZING: 0
EYE WATERING: 0

## 2021-03-09 ENCOUNTER — OFFICE VISIT (OUTPATIENT)
Dept: PRIMARY CARE CLINIC | Age: 41
End: 2021-03-09
Payer: COMMERCIAL

## 2021-03-09 VITALS
SYSTOLIC BLOOD PRESSURE: 126 MMHG | DIASTOLIC BLOOD PRESSURE: 62 MMHG | HEIGHT: 66 IN | OXYGEN SATURATION: 99 % | WEIGHT: 178 LBS | HEART RATE: 105 BPM | BODY MASS INDEX: 28.61 KG/M2 | TEMPERATURE: 97.5 F | RESPIRATION RATE: 18 BRPM

## 2021-03-09 DIAGNOSIS — Z13.220 LIPID SCREENING: ICD-10-CM

## 2021-03-09 DIAGNOSIS — N94.6 PAINFUL MENSTRUAL PERIODS: ICD-10-CM

## 2021-03-09 DIAGNOSIS — N92.0 MENORRHAGIA WITH REGULAR CYCLE: Primary | ICD-10-CM

## 2021-03-09 DIAGNOSIS — R63.5 WEIGHT GAIN: ICD-10-CM

## 2021-03-09 PROCEDURE — 99214 OFFICE O/P EST MOD 30 MIN: CPT | Performed by: NURSE PRACTITIONER

## 2021-03-09 PROCEDURE — G8427 DOCREV CUR MEDS BY ELIG CLIN: HCPCS | Performed by: NURSE PRACTITIONER

## 2021-03-09 PROCEDURE — G8484 FLU IMMUNIZE NO ADMIN: HCPCS | Performed by: NURSE PRACTITIONER

## 2021-03-09 PROCEDURE — 1036F TOBACCO NON-USER: CPT | Performed by: NURSE PRACTITIONER

## 2021-03-09 PROCEDURE — G8419 CALC BMI OUT NRM PARAM NOF/U: HCPCS | Performed by: NURSE PRACTITIONER

## 2021-03-09 ASSESSMENT — ENCOUNTER SYMPTOMS
SORE THROAT: 0
COUGH: 0
CONSTIPATION: 0
NAUSEA: 0
WHEEZING: 0
RHINORRHEA: 0
SHORTNESS OF BREATH: 0
ABDOMINAL PAIN: 0
VOMITING: 0
DIARRHEA: 0

## 2021-03-09 ASSESSMENT — PATIENT HEALTH QUESTIONNAIRE - PHQ9
2. FEELING DOWN, DEPRESSED OR HOPELESS: 0
1. LITTLE INTEREST OR PLEASURE IN DOING THINGS: 0
SUM OF ALL RESPONSES TO PHQ9 QUESTIONS 1 & 2: 0
SUM OF ALL RESPONSES TO PHQ QUESTIONS 1-9: 0

## 2021-03-09 NOTE — PATIENT INSTRUCTIONS
SURVEY:    You may be receiving a survey from [employer] regarding your visit today. Please complete the survey to enable us to provide the highest quality of care to you and your family. If you cannot score us a very good on any question, please call the office to discuss how we could have made your experience a very good one. Thank you.

## 2021-03-09 NOTE — PROGRESS NOTES
Name: Martell Magana  : 1980         Chief Complaint:     Chief Complaint   Patient presents with   Purificacion 1076 seen 3 years Vaibhav,weight loss discuss       History of Present Illness:      Martell Magana is a 47 y.o.  female who presents with Establish Care (Last seen 3 years Vaibhav,weight loss discuss)      Vannessa Salcedo is here today to establish care. She has been seen in this office previously for walk in care. Menorrhagia/menstrual painpatient states she has a longstanding history of very heavy periods with significant pain. She is looking for a local OB/GYN in the area. She does believe that this problem is causing her to gain weight. Weight gainsteady over the last 3 years. Patient states she has gained approximately 50 pounds. She states she is a vegetarian and exercises daily. She cannot understand why she is gaining weight. Other  This is a chronic (WEIGHT GAIN) problem. The current episode started more than 1 year ago. The problem occurs constantly. The problem has been unchanged. Pertinent negatives include no abdominal pain, chest pain, chills, congestion, coughing, fatigue, fever, headaches, nausea, neck pain, rash, sore throat or vomiting. Nothing aggravates the symptoms. Treatments tried: DIET AND EXERCISE. The treatment provided no relief. Past Medical History:     History reviewed. No pertinent past medical history. Reviewed all health maintenance requirements and ordered appropriate tests  There are no preventive care reminders to display for this patient. Past Surgical History:     Past Surgical History:   Procedure Laterality Date    UPPER GASTROINTESTINAL ENDOSCOPY          Medications:       Prior to Admission medications    Medication Sig Start Date End Date Taking?  Authorizing Provider   montelukast (SINGULAIR) 10 MG tablet Take 1 tablet by mouth daily  Patient taking differently: Take 10 mg by mouth as needed  19  Yes Kourtney Gloss Might, APRN - CNP   ibuprofen (IBU) 600 MG tablet Take 1 tablet by mouth every 6 hours as needed for Pain With food or milk 8/22/18  Yes Pietro Weaver, DO   Multiple Vitamins-Minerals (THERAPEUTIC MULTIVITAMIN-MINERALS) tablet Take 1 tablet by mouth daily   Yes Historical Provider, MD        Allergies:       Ceclor [cefaclor]    Social History:     Tobacco:    reports that she quit smoking about 6 years ago. Her smoking use included cigarettes. She has never used smokeless tobacco.  Alcohol:      reports no history of alcohol use. Drug Use:  reports no history of drug use. Family History:     Family History   Problem Relation Age of Onset    Cancer Father     Hypertension Father     Heart Disease Father        Review of Systems:     Positive and Negative as described in HPI    Review of Systems   Constitutional: Negative for chills, fatigue and fever. HENT: Negative for congestion, rhinorrhea and sore throat. Eyes: Negative for visual disturbance. Respiratory: Negative for cough, shortness of breath and wheezing. Cardiovascular: Negative for chest pain and palpitations. Gastrointestinal: Negative for abdominal pain, constipation, diarrhea, nausea and vomiting. Genitourinary: Positive for menstrual problem. Negative for difficulty urinating and dysuria. Musculoskeletal: Negative for gait problem, neck pain and neck stiffness. Skin: Negative for rash. Neurological: Negative for dizziness, syncope, light-headedness and headaches. Physical Exam:   Vitals:  /62 (Site: Left Upper Arm)   Pulse 105   Temp 97.5 °F (36.4 °C) (Temporal)   Resp 18   Ht 5' 6\" (1.676 m)   Wt 178 lb (80.7 kg)   SpO2 99%   BMI 28.73 kg/m²     Physical Exam  Constitutional:       General: She is not in acute distress. Appearance: Normal appearance. She is not ill-appearing. HENT:      Mouth/Throat:      Mouth: Mucous membranes are moist.   Eyes:      General: No scleral icterus. Conjunctiva/sclera: Conjunctivae normal.   Neck:      Musculoskeletal: Normal range of motion and neck supple. Vascular: No carotid bruit. Cardiovascular:      Rate and Rhythm: Normal rate and regular rhythm. Heart sounds: No murmur. Pulmonary:      Effort: Pulmonary effort is normal.      Breath sounds: Normal breath sounds. Abdominal:      General: Bowel sounds are normal. There is no distension. Palpations: Abdomen is soft. Tenderness: There is no abdominal tenderness. Musculoskeletal:      Right lower leg: No edema. Left lower leg: No edema. Lymphadenopathy:      Cervical: No cervical adenopathy. Skin:     General: Skin is warm and dry. Findings: No rash. Neurological:      Mental Status: She is alert and oriented to person, place, and time. Psychiatric:         Mood and Affect: Mood normal.         Behavior: Behavior normal.         Data:     Lab Results   Component Value Date     06/01/2018    K 3.4 06/01/2018     06/01/2018    CO2 23 06/01/2018    BUN 9 06/01/2018    CREATININE 0.49 06/01/2018    GLUCOSE 104 06/01/2018    PROT 8.2 03/06/2018    LABALBU 4.9 03/06/2018    BILITOT 1.08 03/06/2018    ALKPHOS 66 03/06/2018    AST 23 03/06/2018    ALT 12 03/06/2018     Lab Results   Component Value Date    WBC 8.1 06/01/2018    RBC 4.37 06/01/2018    HGB 13.4 06/01/2018    HCT 38.4 06/01/2018    MCV 87.9 06/01/2018    MCH 30.7 06/01/2018    MCHC 34.9 06/01/2018    RDW 12.3 06/01/2018     06/01/2018    MPV 9.1 06/01/2018     No results found for: TSH  No results found for: CHOL, HDL, PSA, LABA1C    Assessment/Plan:      Diagnosis Orders   1. Menorrhagia with regular cycle  CBC Auto Differential    Comprehensive Metabolic Panel    Iron and TIBC    Vitamin B12    T4, Free    TSH without Reflex    Araceli Borges, Carlos Fenton CNM, OB/GYN, [place]   2. Painful menstrual periods  [address], Carlos Fenton CNM, OB/GYN, [place]   3. Weight gain     4.  Lipid screening Lipid Panel     We will obtain labs to rule out any metabolic causes of her weight gain. We will refer her to OB/GYN for further evaluation. Follow-up in 2 weeks with results. 1.  Samantha Larson received counseling on the following healthy behaviors: nutrition, exercise and medication adherence  2. Patient given educational materials - see patient instructions  3. Was a self-tracking handout given in paper form or via [employer] your [employer]? No  If yes, see orders or list here. 4.  Discussed use, benefit, and side effects of prescribed medications. Barriers to medication compliance addressed. All patient questions answered. Pt voiced understanding. 5.  Reviewed prior labs and health maintenance  6. Continue current medications, diet and exercise. Completed Refills   Requested Prescriptions      No prescriptions requested or ordered in this encounter         Return in about 2 weeks (around 3/23/2021) for Recheck.

## 2021-03-10 ENCOUNTER — HOSPITAL ENCOUNTER (OUTPATIENT)
Age: 41
Discharge: HOME | End: 2021-03-10
Payer: COMMERCIAL

## 2021-03-10 DIAGNOSIS — Z13.220 LIPID SCREENING: ICD-10-CM

## 2021-03-10 DIAGNOSIS — N92.0 MENORRHAGIA WITH REGULAR CYCLE: ICD-10-CM

## 2021-03-10 LAB
ABSOLUTE EOS #: 0.5 K/UL (ref 0–0.44)
ABSOLUTE IMMATURE GRANULOCYTE: 0.03 K/UL (ref 0–0.3)
ABSOLUTE LYMPH #: 1.63 K/UL (ref 1.1–3.7)
ABSOLUTE MONO #: 0.51 K/UL (ref 0.1–1.2)
ALBUMIN SERPL-MCNC: 4.3 G/DL (ref 3.5–5.2)
ALBUMIN/GLOBULIN RATIO: 1.4 (ref 1–2.5)
ALP BLD-CCNC: 89 U/L (ref 35–104)
ALT SERPL-CCNC: 21 U/L (ref 5–33)
ANION GAP SERPL CALCULATED.3IONS-SCNC: 11 MMOL/L (ref 9–17)
AST SERPL-CCNC: 22 U/L
BASOPHILS # BLD: 0 % (ref 0–2)
BASOPHILS ABSOLUTE: <0.03 K/UL (ref 0–0.2)
BILIRUB SERPL-MCNC: 0.3 MG/DL (ref 0.3–1.2)
BUN BLDV-MCNC: 15 MG/DL (ref 6–20)
BUN/CREAT BLD: 30 (ref 9–20)
CALCIUM SERPL-MCNC: 9 MG/DL (ref 8.6–10.4)
CHLORIDE BLD-SCNC: 104 MMOL/L (ref 98–107)
CHOLESTEROL/HDL RATIO: 2.9
CHOLESTEROL: 179 MG/DL
CO2: 22 MMOL/L (ref 20–31)
CREAT SERPL-MCNC: 0.5 MG/DL (ref 0.5–0.9)
DIFFERENTIAL TYPE: ABNORMAL
EOSINOPHILS RELATIVE PERCENT: 7 % (ref 1–4)
GFR AFRICAN AMERICAN: >60 ML/MIN
GFR NON-AFRICAN AMERICAN: >60 ML/MIN
GFR SERPL CREATININE-BSD FRML MDRD: ABNORMAL ML/MIN/{1.73_M2}
GFR SERPL CREATININE-BSD FRML MDRD: ABNORMAL ML/MIN/{1.73_M2}
GLUCOSE BLD-MCNC: 101 MG/DL (ref 70–99)
HCT VFR BLD CALC: 38.3 % (ref 36.3–47.1)
HDLC SERPL-MCNC: 62 MG/DL
HEMOGLOBIN: 11.9 G/DL (ref 11.9–15.1)
IMMATURE GRANULOCYTES: 0 %
IRON SATURATION: 9 % (ref 20–55)
IRON: 42 UG/DL (ref 37–145)
LDL CHOLESTEROL: 99 MG/DL (ref 0–130)
LYMPHOCYTES # BLD: 22 % (ref 24–43)
MCH RBC QN AUTO: 26.6 PG (ref 25.2–33.5)
MCHC RBC AUTO-ENTMCNC: 31.1 G/DL (ref 28.4–34.8)
MCV RBC AUTO: 85.7 FL (ref 82.6–102.9)
MONOCYTES # BLD: 7 % (ref 3–12)
NRBC AUTOMATED: 0 PER 100 WBC
PDW BLD-RTO: 14.2 % (ref 11.8–14.4)
PLATELET # BLD: 389 K/UL (ref 138–453)
PLATELET ESTIMATE: ABNORMAL
PMV BLD AUTO: 8.6 FL (ref 8.1–13.5)
POTASSIUM SERPL-SCNC: 4.1 MMOL/L (ref 3.7–5.3)
RBC # BLD: 4.47 M/UL (ref 3.95–5.11)
RBC # BLD: ABNORMAL 10*6/UL
SEG NEUTROPHILS: 64 % (ref 36–65)
SEGMENTED NEUTROPHILS ABSOLUTE COUNT: 4.77 K/UL (ref 1.5–8.1)
SODIUM BLD-SCNC: 137 MMOL/L (ref 135–144)
THYROXINE, FREE: 0.97 NG/DL (ref 0.93–1.7)
TOTAL IRON BINDING CAPACITY: 461 UG/DL (ref 250–450)
TOTAL PROTEIN: 7.4 G/DL (ref 6.4–8.3)
TRIGL SERPL-MCNC: 90 MG/DL
TSH SERPL DL<=0.05 MIU/L-ACNC: 3.18 MIU/L (ref 0.3–5)
UNSATURATED IRON BINDING CAPACITY: 419 UG/DL (ref 112–347)
VITAMIN B-12: 569 PG/ML (ref 232–1245)
VLDLC SERPL CALC-MCNC: NORMAL MG/DL (ref 1–30)
WBC # BLD: 7.5 K/UL (ref 3.5–11.3)
WBC # BLD: ABNORMAL 10*3/UL

## 2021-03-10 PROCEDURE — 80053 COMPREHEN METABOLIC PANEL: CPT

## 2021-03-10 PROCEDURE — 84439 ASSAY OF FREE THYROXINE: CPT

## 2021-03-10 PROCEDURE — 36415 COLL VENOUS BLD VENIPUNCTURE: CPT

## 2021-03-10 PROCEDURE — 80061 LIPID PANEL: CPT

## 2021-03-10 PROCEDURE — 84443 ASSAY THYROID STIM HORMONE: CPT

## 2021-03-10 PROCEDURE — 82607 VITAMIN B-12: CPT

## 2021-03-10 PROCEDURE — 85025 COMPLETE CBC W/AUTO DIFF WBC: CPT

## 2021-03-10 PROCEDURE — 83540 ASSAY OF IRON: CPT

## 2021-03-10 PROCEDURE — 83550 IRON BINDING TEST: CPT

## 2021-03-24 ENCOUNTER — OFFICE VISIT (OUTPATIENT)
Dept: PRIMARY CARE CLINIC | Age: 41
End: 2021-03-24
Payer: COMMERCIAL

## 2021-03-24 VITALS
HEART RATE: 84 BPM | WEIGHT: 178.1 LBS | OXYGEN SATURATION: 99 % | TEMPERATURE: 97.8 F | DIASTOLIC BLOOD PRESSURE: 78 MMHG | BODY MASS INDEX: 28.75 KG/M2 | SYSTOLIC BLOOD PRESSURE: 122 MMHG | RESPIRATION RATE: 20 BRPM

## 2021-03-24 DIAGNOSIS — D50.8 IRON DEFICIENCY ANEMIA SECONDARY TO INADEQUATE DIETARY IRON INTAKE: Primary | ICD-10-CM

## 2021-03-24 DIAGNOSIS — E03.8 SUBCLINICAL HYPOTHYROIDISM: ICD-10-CM

## 2021-03-24 PROCEDURE — G8419 CALC BMI OUT NRM PARAM NOF/U: HCPCS | Performed by: NURSE PRACTITIONER

## 2021-03-24 PROCEDURE — 1036F TOBACCO NON-USER: CPT | Performed by: NURSE PRACTITIONER

## 2021-03-24 PROCEDURE — 99214 OFFICE O/P EST MOD 30 MIN: CPT | Performed by: NURSE PRACTITIONER

## 2021-03-24 PROCEDURE — G8484 FLU IMMUNIZE NO ADMIN: HCPCS | Performed by: NURSE PRACTITIONER

## 2021-03-24 PROCEDURE — G8427 DOCREV CUR MEDS BY ELIG CLIN: HCPCS | Performed by: NURSE PRACTITIONER

## 2021-03-24 RX ORDER — LEVOTHYROXINE SODIUM 0.05 MG/1
50 TABLET ORAL DAILY
Qty: 90 TABLET | Refills: 0 | Status: SHIPPED | OUTPATIENT
Start: 2021-03-24 | End: 2021-06-22 | Stop reason: SDUPTHER

## 2021-03-24 RX ORDER — FERROUS SULFATE 300 MG/5ML
300 LIQUID (ML) ORAL DAILY
Qty: 300 ML | Refills: 2 | Status: SHIPPED | OUTPATIENT
Start: 2021-03-24 | End: 2022-07-12

## 2021-03-24 ASSESSMENT — ENCOUNTER SYMPTOMS
NAUSEA: 0
VOMITING: 0
SORE THROAT: 0
CONSTIPATION: 0
DIARRHEA: 0
WHEEZING: 0
SHORTNESS OF BREATH: 0
ABDOMINAL PAIN: 0
RHINORRHEA: 0
COUGH: 0

## 2021-03-24 NOTE — PROGRESS NOTES
Name: Joshua Garcia  : 1980         Chief Complaint:     Chief Complaint   Patient presents with   [employer]     wants discuss weight loss       History of Present Illness:      Joshua Garcia is a 47 y.o.  female who presents with Discuss Labs (wants discuss weight loss)      Gerda Mullins is here today for a routine follow-up exam.    YONI-chronic, patient just admitted today that she has been a vegetarian for life. She is always had trouble with her iron. She did not reveal this at last visit. She has very low iron stores at present. Subclinical hypothyroidism-she is complaining of fatigue which has been recurrent. Her T4 is on the low end of normal.  TSH is around 3.1. See below for further comment regarding fatigue. Fatigue  This is a recurrent problem. The current episode started more than 1 year ago. The problem occurs intermittently. The problem has been unchanged. Associated symptoms include fatigue. Pertinent negatives include no abdominal pain, chest pain, chills, congestion, coughing, fever, headaches, nausea, neck pain, rash, sore throat or vomiting. Nothing aggravates the symptoms. She has tried rest and sleep for the symptoms. The treatment provided mild relief. Past Medical History:     History reviewed. No pertinent past medical history. Reviewed all health maintenance requirements and ordered appropriate tests  There are no preventive care reminders to display for this patient. Past Surgical History:     Past Surgical History:   Procedure Laterality Date    UPPER GASTROINTESTINAL ENDOSCOPY          Medications:       Prior to Admission medications    Medication Sig Start Date End Date Taking?  Authorizing Provider   ferrous sulfate 300 (60 Fe) MG/5ML syrup Take 5 mLs by mouth daily 3/24/21  Yes Sharonda Becerra Might, APRN - CNP   levothyroxine (SYNTHROID) 50 MCG tablet Take 1 tablet by mouth daily 3/24/21  Yes Sharonda Becerra Might, APRN - CNP   ibuprofen (IBU) 600 MG tablet Take 1 tablet by mouth every 6 hours as needed for Pain With food or milk 8/22/18  Yes Gustavo Lam, DO   Multiple Vitamins-Minerals (THERAPEUTIC MULTIVITAMIN-MINERALS) tablet Take 1 tablet by mouth daily   Yes Historical Provider, MD bhatia (SINGULAIR) 10 MG tablet Take 1 tablet by mouth daily  Patient not taking: Reported on 3/24/2021 11/5/19   Kadysydney Evangelista Hernadez, APRN - CNP        Allergies:       Ceclor [cefaclor]    Social History:     Tobacco:    reports that she quit smoking about 6 years ago. Her smoking use included cigarettes. She has never used smokeless tobacco.  Alcohol:      reports no history of alcohol use. Drug Use:  reports no history of drug use. Family History:     Family History   Problem Relation Age of Onset    Cancer Father     Hypertension Father     Heart Disease Father        Review of Systems:     Positive and Negative as described in HPI    Review of Systems   Constitutional: Positive for fatigue. Negative for chills and fever. HENT: Negative for congestion, rhinorrhea and sore throat. Eyes: Negative for visual disturbance. Respiratory: Negative for cough, shortness of breath and wheezing. Cardiovascular: Negative for chest pain and palpitations. Gastrointestinal: Negative for abdominal pain, constipation, diarrhea, nausea and vomiting. Genitourinary: Positive for menstrual problem. Negative for difficulty urinating and dysuria. Musculoskeletal: Negative for gait problem, neck pain and neck stiffness. Skin: Negative for rash. Neurological: Negative for dizziness, syncope, light-headedness and headaches. Physical Exam:   Vitals:  /78 (Site: Left Upper Arm)   Pulse 84   Temp 97.8 °F (36.6 °C) (Temporal)   Resp 20   Wt 178 lb 1.6 oz (80.8 kg)   SpO2 99%   BMI 28.75 kg/m²     Physical Exam  Constitutional:       General: She is not in acute distress. Appearance: Normal appearance. She is not ill-appearing.    HENT:      Mouth/Throat: Mouth: Mucous membranes are moist.   Eyes:      General: No scleral icterus. Conjunctiva/sclera: Conjunctivae normal.   Neck:      Musculoskeletal: Normal range of motion and neck supple. Vascular: No carotid bruit. Cardiovascular:      Rate and Rhythm: Normal rate and regular rhythm. Heart sounds: No murmur. Pulmonary:      Effort: Pulmonary effort is normal.      Breath sounds: Normal breath sounds. Abdominal:      General: Bowel sounds are normal. There is no distension. Palpations: Abdomen is soft. Tenderness: There is no abdominal tenderness. Musculoskeletal:      Right lower leg: No edema. Left lower leg: No edema. Lymphadenopathy:      Cervical: No cervical adenopathy. Skin:     General: Skin is warm and dry. Findings: No rash. Neurological:      Mental Status: She is alert and oriented to person, place, and time. Psychiatric:         Mood and Affect: Mood normal.         Behavior: Behavior normal.         Data:     Lab Results   Component Value Date     03/10/2021    K 4.1 03/10/2021     03/10/2021    CO2 22 03/10/2021    BUN 15 03/10/2021    CREATININE 0.50 03/10/2021    GLUCOSE 101 03/10/2021    PROT 7.4 03/10/2021    LABALBU 4.3 03/10/2021    BILITOT 0.30 03/10/2021    ALKPHOS 89 03/10/2021    AST 22 03/10/2021    ALT 21 03/10/2021     Lab Results   Component Value Date    WBC 7.5 03/10/2021    RBC 4.47 03/10/2021    HGB 11.9 03/10/2021    HCT 38.3 03/10/2021    MCV 85.7 03/10/2021    MCH 26.6 03/10/2021    MCHC 31.1 03/10/2021    RDW 14.2 03/10/2021     03/10/2021    MPV 8.6 03/10/2021     Lab Results   Component Value Date    TSH 3.18 03/10/2021     Lab Results   Component Value Date    CHOL 179 03/10/2021    HDL 62 03/10/2021       Assessment/Plan:      Diagnosis Orders   1. Iron deficiency anemia secondary to inadequate dietary iron intake  ferrous sulfate 300 (60 Fe) MG/5ML syrup    Iron and TIBC   2.  Subclinical hypothyroidism levothyroxine (SYNTHROID) 50 MCG tablet    T4, Free    TSH without Reflex     Patient states she is unable to swallow pills so we will start liquid iron 2 times daily with meals. She states she probably will be will to swallow a small levothyroxine daily. We will trial this. We will recheck in 3 months with labs. 1.  Dheeraj Le received counseling on the following healthy behaviors: nutrition, exercise and medication adherence  2. Patient given educational materials - see patient instructions  3. Was a self-tracking handout given in paper form or via [employer]? No  If yes, see orders or list here. 4.  Discussed use, benefit, and side effects of prescribed medications. Barriers to medication compliance addressed. All patient questions answered. Pt voiced understanding. 5.  Reviewed prior labs and health maintenance  6. Continue current medications, diet and exercise. Completed Refills   Requested Prescriptions     Signed Prescriptions Disp Refills    ferrous sulfate 300 (60 Fe) MG/5ML syrup 300 mL 2     Sig: Take 5 mLs by mouth daily    levothyroxine (SYNTHROID) 50 MCG tablet 90 tablet 0     Sig: Take 1 tablet by mouth daily         Return in about 3 months (around 6/24/2021) for Check up.

## 2021-03-24 NOTE — PATIENT INSTRUCTIONS
SURVEY:    You may be receiving a survey from [email or web address] regarding your visit today. Please complete the survey to enable us to provide the highest quality of care to you and your family. If you cannot score us a very good on any question, please call the office to discuss how we could have made your experience a very good one. Thank you.

## 2021-04-20 ENCOUNTER — HOSPITAL ENCOUNTER (OUTPATIENT)
Age: 41
Setting detail: SPECIMEN
Discharge: HOME | End: 2021-04-20
Payer: COMMERCIAL

## 2021-04-20 ENCOUNTER — OFFICE VISIT (OUTPATIENT)
Dept: OBGYN | Age: 41
End: 2021-04-20
Payer: COMMERCIAL

## 2021-04-20 VITALS
BODY MASS INDEX: 28.35 KG/M2 | SYSTOLIC BLOOD PRESSURE: 136 MMHG | DIASTOLIC BLOOD PRESSURE: 80 MMHG | HEIGHT: 66 IN | WEIGHT: 176.4 LBS

## 2021-04-20 DIAGNOSIS — Z12.4 SCREENING FOR CERVICAL CANCER: ICD-10-CM

## 2021-04-20 DIAGNOSIS — G43.821 MENSTRUAL MIGRAINE WITH STATUS MIGRAINOSUS, NOT INTRACTABLE: ICD-10-CM

## 2021-04-20 DIAGNOSIS — N92.0 MENORRHAGIA WITH REGULAR CYCLE: Primary | ICD-10-CM

## 2021-04-20 DIAGNOSIS — N92.0 MENORRHAGIA WITH REGULAR CYCLE: ICD-10-CM

## 2021-04-20 DIAGNOSIS — N94.6 DYSMENORRHEA: ICD-10-CM

## 2021-04-20 DIAGNOSIS — Z12.31 SCREENING MAMMOGRAM, ENCOUNTER FOR: ICD-10-CM

## 2021-04-20 PROCEDURE — 99203 OFFICE O/P NEW LOW 30 MIN: CPT | Performed by: ADVANCED PRACTICE MIDWIFE

## 2021-04-20 PROCEDURE — 87491 CHLMYD TRACH DNA AMP PROBE: CPT

## 2021-04-20 PROCEDURE — G0145 SCR C/V CYTO,THINLAYER,RESCR: HCPCS

## 2021-04-20 PROCEDURE — 87591 N.GONORRHOEAE DNA AMP PROB: CPT

## 2021-04-20 PROCEDURE — 87624 HPV HI-RISK TYP POOLED RSLT: CPT

## 2021-04-20 PROCEDURE — G8427 DOCREV CUR MEDS BY ELIG CLIN: HCPCS | Performed by: ADVANCED PRACTICE MIDWIFE

## 2021-04-20 PROCEDURE — G8419 CALC BMI OUT NRM PARAM NOF/U: HCPCS | Performed by: ADVANCED PRACTICE MIDWIFE

## 2021-04-20 PROCEDURE — 1036F TOBACCO NON-USER: CPT | Performed by: ADVANCED PRACTICE MIDWIFE

## 2021-04-20 RX ORDER — NAPROXEN SODIUM 550 MG/1
550 TABLET ORAL 2 TIMES DAILY WITH MEALS
Qty: 60 TABLET | Refills: 3 | Status: SHIPPED | OUTPATIENT
Start: 2021-04-20 | End: 2021-06-21

## 2021-04-20 SDOH — HEALTH STABILITY: MENTAL HEALTH: HOW OFTEN DO YOU HAVE A DRINK CONTAINING ALCOHOL?: NOT ASKED

## 2021-04-20 SDOH — HEALTH STABILITY: MENTAL HEALTH: HOW MANY STANDARD DRINKS CONTAINING ALCOHOL DO YOU HAVE ON A TYPICAL DAY?: NOT ASKED

## 2021-04-21 LAB
CHLAMYDIA BY THIN PREP: NEGATIVE
N. GONORRHOEAE DNA, THIN PREP: NEGATIVE
SPECIMEN DESCRIPTION: NORMAL

## 2021-04-22 LAB
CYTOLOGY REPORT: NORMAL
HPV SAMPLE: NORMAL
HPV, GENOTYPE 16: NOT DETECTED
HPV, GENOTYPE 18: NOT DETECTED
HPV, HIGH RISK OTHER: NOT DETECTED
HPV, INTERPRETATION: NORMAL
SPECIMEN DESCRIPTION: NORMAL

## 2021-05-11 ENCOUNTER — OFFICE VISIT (OUTPATIENT)
Dept: OBGYN | Age: 41
End: 2021-05-11
Payer: COMMERCIAL

## 2021-05-11 VITALS — DIASTOLIC BLOOD PRESSURE: 86 MMHG | SYSTOLIC BLOOD PRESSURE: 140 MMHG | WEIGHT: 176.4 LBS | BODY MASS INDEX: 28.47 KG/M2

## 2021-05-11 DIAGNOSIS — N94.6 DYSMENORRHEA: ICD-10-CM

## 2021-05-11 DIAGNOSIS — N92.0 MENORRHAGIA WITH REGULAR CYCLE: Primary | ICD-10-CM

## 2021-05-11 DIAGNOSIS — N80.03 ADENOMYOSIS: ICD-10-CM

## 2021-05-11 PROCEDURE — 99213 OFFICE O/P EST LOW 20 MIN: CPT | Performed by: OBSTETRICS & GYNECOLOGY

## 2021-05-11 PROCEDURE — G8427 DOCREV CUR MEDS BY ELIG CLIN: HCPCS | Performed by: OBSTETRICS & GYNECOLOGY

## 2021-05-11 PROCEDURE — G8419 CALC BMI OUT NRM PARAM NOF/U: HCPCS | Performed by: OBSTETRICS & GYNECOLOGY

## 2021-05-11 PROCEDURE — 1036F TOBACCO NON-USER: CPT | Performed by: OBSTETRICS & GYNECOLOGY

## 2021-05-11 NOTE — PATIENT INSTRUCTIONS
Patient Education        Endometrial Ablation: Before Your Procedure  What is endometrial ablation? Endometrial ablation is a type of procedure that's often used to treat heavy menstrual bleeding. It can also be used for other types of bleeding in the uterus. It's not recommended if you plan to get pregnant. Ablation works by destroying the lining of your uterus. As it heals, the lining will scar. This scarring reduces or prevents bleeding. Your doctor may give you medicine to help you relax. You may also get medicine to help with pain. First, your doctor inserts a special tool into your vagina. This is called a speculum. It gently spreads apart the sides of your vagina. Next, the doctor may put a lighted tube through your cervix. This is called a hysteroscope or scope. It helps the doctor see inside your uterus. Then the doctor inserts a device to destroy the lining. This device may work in one of many ways. It may use a laser beam, heat, electricity, freezing, or microwaves. Ablation can be done in a doctor's office. Or it may be done in a hospital. It usually takes less than an hour. You can go home after the procedure. Follow-up care is a key part of your treatment and safety. Be sure to make and go to all appointments, and call your doctor if you are having problems. It's also a good idea to know your test results and keep a list of the medicines you take. How do you prepare for the procedure? Procedures can be stressful. This information will help you understand what you can expect. And it will help you safely prepare for your procedure. Preparing for the procedure    · Be sure you have someone to take you home.  Anesthesia and pain medicine will make it unsafe for you to drive or get home on your own.     · Understand exactly what procedure is planned, along with the risks, benefits, and other options.     · If you take aspirin or some other blood thinner, ask your doctor if you should stop taking it before your procedure. Make sure that you understand exactly what your doctor wants you to do. These medicines increase the risk of bleeding.     · Tell your doctor ALL the medicines, vitamins, supplements, and herbal remedies you take. Some may increase the risk of problems during your procedure. Your doctor will tell you if you should stop taking any of them before the procedure and how soon to do it.     · Make sure your doctor and the hospital have a copy of your advance directive. If you don't have one, you may want to prepare one. It lets others know your health care wishes. It's a good thing to have before any type of surgery or procedure. What happens on the day of the procedure? · Follow the instructions exactly about when to stop eating and drinking. If you don't, your procedure may be canceled. If your doctor told you to take your medicines on the day of the procedure, take them with only a sip of water.     · Take a bath or shower before you come in for your procedure. Do not apply lotions, perfumes, deodorants, or nail polish.     · Take off all jewelry and piercings. And take out contact lenses, if you wear them. At the doctor's office or hospital   · Bring a picture ID.     · You will be kept comfortable and safe by your anesthesia provider. You may get medicine that relaxes you or puts you in a light sleep.     · The procedure will take less than an hour. When should you call your doctor? · You have questions or concerns.     · You do not understand how to prepare for your procedure.     · You become ill before the procedure (such as fever, flu, or a cold).     · You need to reschedule or have changed your mind about having the procedure. Where can you learn more? Go to https://[email or web address].[employer]. org and sign in to your travelmob account. Enter Z443 in the Instablogs box to learn more about \"Endometrial Ablation: Before Your Procedure. \"     If you do not have an account, please click on the \"Sign Up Now\" link. Current as of: July 17, 2020               Content Version: 12.8  © 2006-2021 Healthwise, Incorporated. Care instructions adapted under license by [facility] [place] ([facility]). If you have questions about a medical condition or this instruction, always ask your healthcare professional. [address] any warranty or liability for your use of this information.

## 2021-05-11 NOTE — PROGRESS NOTES
DATE OF VISIT:  5/11/21    PATIENT NAME:  Rubin Conde     YOB: 1980    REASON FOR VISIT:    Chief Complaint   Patient presents with    Follow-up     Here to follow up on her usn from this afternoon for menorrhagia and dysmenorrhea        HISTORY OF PRESENT ILLNESS:  C/O painful heavy menses with cramps and hip pain. C/O migraines several days prior to period and at end of period. Symptoms for last 4 years but getting progressivly worse. Patient has used Ibuprofen without relief. Heart palpitations, nausea hot flashes. Patient recently diagnosed with low thyroid and is taking levothyroxin from [facility]. Saw SS in April and usn and f/u were scheduled; usn today showed:   UTERUS:anteverted, inhomogeneous echo pattern   ENDO:5mm in thickness   RT. OVARY:seen, follicles visualized  LT. OVARY:seen, wnl   Reviewed findings with pt; disc'd adenomyosis, menorrhagia and treatment options including pg iud, ocps, ablation, tlh      Patient's last menstrual period was 05/06/2021. Vitals:    05/11/21 1429   BP: (!) 140/86   Weight: 176 lb 6.4 oz (80 kg)     Body mass index is 28.47 kg/m². Allergies   Allergen Reactions    Ceclor [Cefaclor] Rash     Current Outpatient Medications   Medication Sig Dispense Refill    naproxen sodium (ANAPROX) 550 MG tablet Take 1 tablet by mouth 2 times daily (with meals) pretreat menses 60 tablet 3    ferrous sulfate 300 (60 Fe) MG/5ML syrup Take 5 mLs by mouth daily 300 mL 2    levothyroxine (SYNTHROID) 50 MCG tablet Take 1 tablet by mouth daily 90 tablet 0    montelukast (SINGULAIR) 10 MG tablet Take 1 tablet by mouth daily 30 tablet 0    ibuprofen (IBU) 600 MG tablet Take 1 tablet by mouth every 6 hours as needed for Pain With food or milk 30 tablet 0    Multiple Vitamins-Minerals (THERAPEUTIC MULTIVITAMIN-MINERALS) tablet Take 1 tablet by mouth daily       No current facility-administered medications for this visit.       Social History Socioeconomic History    Marital status: Single     Spouse name: None    Number of children: None    Years of education: None    Highest education level: None   Occupational History    None   Social Needs    Financial resource strain: None    Food insecurity     Worry: None     Inability: None    Transportation needs     Medical: None     Non-medical: None   Tobacco Use    Smoking status: Former Smoker     Types: Cigarettes     Quit date: 2015     Years since quittin.3    Smokeless tobacco: Never Used   Substance and Sexual Activity    Alcohol use: Yes     Comment: social 1-2 times weekly    Drug use: No    Sexual activity: Yes     Partners: Male     Birth control/protection: Condom   Lifestyle    Physical activity     Days per week: None     Minutes per session: None    Stress: None   Relationships    Social connections     Talks on phone: None     Gets together: None     Attends Mandaeism service: None     Active member of club or organization: None     Attends meetings of clubs or organizations: None     Relationship status: None    Intimate partner violence     Fear of current or ex partner: None     Emotionally abused: None     Physically abused: None     Forced sexual activity: None   Other Topics Concern    None   Social History Narrative    None       REVIEW OF SYSTEMS:  Review of Systems   Genitourinary: Positive for menstrual problem and pelvic pain. Negative for dyspareunia and dysuria. Neurological: Positive for headaches. PHYSICAL EXAM:  BP (!) 140/86   Wt 176 lb 6.4 oz (80 kg)   LMP 2021   BMI 28.47 kg/m²   Physical Exam  Constitutional:       Appearance: Normal appearance. HENT:      Head: Normocephalic and atraumatic. Eyes:      Extraocular Movements: Extraocular movements intact. Pupils: Pupils are equal, round, and reactive to light. Neck:      Musculoskeletal: Normal range of motion. Cardiovascular:      Rate and Rhythm: Normal rate. Pulmonary:      Effort: Pulmonary effort is normal.   Musculoskeletal: Normal range of motion. Neurological:      Mental Status: She is alert and oriented to person, place, and time. Skin:     General: Skin is warm and dry. Psychiatric:         Mood and Affect: Mood normal.         Behavior: Behavior normal.         Thought Content: Thought content normal.         Judgment: Judgment normal.       The patient, Alessia Rehman is a 47 y.o. female, was seen with a total time spent of 20 minutes for the visit on this date of service by the E/M provider. The time component had both face to face and non face to face time spent in determining the total time component. Counseling and education regarding her diagnosis listed below and her options regarding those diagnoses were also included in determining her time component. Diagnosis Orders   1. Menorrhagia with regular cycle     2. Dysmenorrhea     3. Adenomyosis          The patient had her preventative health maintenance recommendations and follow-up reviewed with her at the completion of her visit. ASSESSMENT:      1. Menorrhagia with regular cycle    2. Dysmenorrhea    3. Adenomyosis        PLAN:  No orders of the defined types were placed in this encounter. Return for BA to coordinate.        Electronically signed by Rodrick Zaragoza DO on 05/11/21

## 2021-05-11 NOTE — Clinical Note
Interested in ablation with salpingectomy (can look for endo too then) works at [employer]

## 2021-05-18 ENCOUNTER — TELEPHONE (OUTPATIENT)
Dept: OBGYN | Age: 41
End: 2021-05-18

## 2021-05-19 ENCOUNTER — TELEPHONE (OUTPATIENT)
Dept: OBGYN CLINIC | Age: 41
End: 2021-05-19

## 2021-05-20 DIAGNOSIS — N80.03 ADENOMYOSIS: ICD-10-CM

## 2021-05-20 DIAGNOSIS — Z01.812 ENCOUNTER FOR PRE-OPERATIVE LABORATORY TESTING: ICD-10-CM

## 2021-05-20 DIAGNOSIS — N92.0 MENORRHAGIA WITH REGULAR CYCLE: Primary | ICD-10-CM

## 2021-05-20 DIAGNOSIS — N94.6 DYSMENORRHEA: ICD-10-CM

## 2021-05-20 NOTE — TELEPHONE ENCOUNTER
Called patient to discuss her procedure. She is scheduled for a Laparoscopic Bilateral Salpingectomy, Hysteroscopy D&C, Paul at [facility] on 6/24. She is aware that a nurse from [facility] will call her to complete a phone interview. She has received the COVID vaccine and is aware that if she shows proof, she does not need to have COVID screening prior to surgery. She works at [employer] and will plan to take  one week off work (6/25-7/4). Patient will come in to see Dr. Sumaya Louie prior to surgery and will get labs at that visit. We will also follow up 4 weeks after surgery. Appointments scheduled. Patient verbalized understanding, no further questions/concerns voiced.

## 2021-05-20 NOTE — PROGRESS NOTES
Called patient to discuss her procedure. She is scheduled for a Laparoscopic Bilateral Salpingectomy, Hysteroscopy D&C, Paul at [facility] on 6/24. She is aware that a nurse from [facility] will call her to complete a phone interview. She has received the COVID vaccine and is aware that if she shows proof, she does not need to have COVID screening prior to surgery. She works at [employer] and will plan to take  one week off work (6/25-7/4). Patient will come in to see Dr. Riccardo Islas prior to surgery and will get labs at that visit. We will also follow up 4 weeks after surgery. Appointments scheduled. Patient verbalized understanding, no further questions/concerns voiced.

## 2021-06-14 ENCOUNTER — HOSPITAL ENCOUNTER (OUTPATIENT)
Age: 41
Discharge: HOME | End: 2021-06-14
Payer: COMMERCIAL

## 2021-06-14 DIAGNOSIS — E03.8 SUBCLINICAL HYPOTHYROIDISM: ICD-10-CM

## 2021-06-14 DIAGNOSIS — D50.8 IRON DEFICIENCY ANEMIA SECONDARY TO INADEQUATE DIETARY IRON INTAKE: ICD-10-CM

## 2021-06-14 LAB
IRON SATURATION: 13 % (ref 20–55)
IRON: 56 UG/DL (ref 37–145)
THYROXINE, FREE: 1.23 NG/DL (ref 0.93–1.7)
TOTAL IRON BINDING CAPACITY: 426 UG/DL (ref 250–450)
TSH SERPL DL<=0.05 MIU/L-ACNC: 1.4 MIU/L (ref 0.3–5)
UNSATURATED IRON BINDING CAPACITY: 370 UG/DL (ref 112–347)

## 2021-06-14 PROCEDURE — 36415 COLL VENOUS BLD VENIPUNCTURE: CPT

## 2021-06-14 PROCEDURE — 83540 ASSAY OF IRON: CPT

## 2021-06-14 PROCEDURE — 84439 ASSAY OF FREE THYROXINE: CPT

## 2021-06-14 PROCEDURE — 83550 IRON BINDING TEST: CPT

## 2021-06-14 PROCEDURE — 84443 ASSAY THYROID STIM HORMONE: CPT

## 2021-06-15 ENCOUNTER — TELEPHONE (OUTPATIENT)
Dept: PRIMARY CARE CLINIC | Age: 41
End: 2021-06-15

## 2021-06-15 DIAGNOSIS — E61.1 DIETARY IRON DEFICIENCY: ICD-10-CM

## 2021-06-15 DIAGNOSIS — D50.8 IRON DEFICIENCY ANEMIA SECONDARY TO INADEQUATE DIETARY IRON INTAKE: Primary | ICD-10-CM

## 2021-06-15 NOTE — TELEPHONE ENCOUNTER
Attempted to call patient with lab results and referral to Dr Roque Lindsey, hematology. Phone number has been disconnected.

## 2021-06-15 NOTE — TELEPHONE ENCOUNTER
----- Message from [address] [place] [address], APRN - CNP sent at 6/15/2021  7:36 AM EDT -----  Iron is still very low. I am referring her to hematology. Thank you.

## 2021-06-21 ENCOUNTER — OFFICE VISIT (OUTPATIENT)
Dept: OBGYN | Age: 41
End: 2021-06-21
Payer: COMMERCIAL

## 2021-06-21 ENCOUNTER — HOSPITAL ENCOUNTER (OUTPATIENT)
Age: 41
Discharge: HOME | End: 2021-06-21
Payer: COMMERCIAL

## 2021-06-21 VITALS
WEIGHT: 182 LBS | HEIGHT: 66 IN | BODY MASS INDEX: 29.25 KG/M2 | SYSTOLIC BLOOD PRESSURE: 140 MMHG | DIASTOLIC BLOOD PRESSURE: 88 MMHG

## 2021-06-21 DIAGNOSIS — N94.6 DYSMENORRHEA: ICD-10-CM

## 2021-06-21 DIAGNOSIS — Z01.812 ENCOUNTER FOR PRE-OPERATIVE LABORATORY TESTING: ICD-10-CM

## 2021-06-21 DIAGNOSIS — N80.03 ADENOMYOSIS: ICD-10-CM

## 2021-06-21 DIAGNOSIS — N92.0 MENORRHAGIA WITH REGULAR CYCLE: ICD-10-CM

## 2021-06-21 DIAGNOSIS — N92.0 MENORRHAGIA WITH REGULAR CYCLE: Primary | ICD-10-CM

## 2021-06-21 LAB
ABSOLUTE EOS #: 0.44 K/UL (ref 0–0.44)
ABSOLUTE IMMATURE GRANULOCYTE: <0.03 K/UL (ref 0–0.3)
ABSOLUTE LYMPH #: 1.87 K/UL (ref 1.1–3.7)
ABSOLUTE MONO #: 0.89 K/UL (ref 0.1–1.2)
BASOPHILS # BLD: 0 % (ref 0–2)
BASOPHILS ABSOLUTE: <0.03 K/UL (ref 0–0.2)
DIFFERENTIAL TYPE: ABNORMAL
EOSINOPHILS RELATIVE PERCENT: 6 % (ref 1–4)
HCG QUALITATIVE: NEGATIVE
HCT VFR BLD CALC: 38.1 % (ref 36.3–47.1)
HEMOGLOBIN: 12.7 G/DL (ref 11.9–15.1)
IMMATURE GRANULOCYTES: 0 %
LYMPHOCYTES # BLD: 27 % (ref 24–43)
MCH RBC QN AUTO: 29.2 PG (ref 25.2–33.5)
MCHC RBC AUTO-ENTMCNC: 33.3 G/DL (ref 28.4–34.8)
MCV RBC AUTO: 87.6 FL (ref 82.6–102.9)
MONOCYTES # BLD: 13 % (ref 3–12)
NRBC AUTOMATED: 0 PER 100 WBC
PDW BLD-RTO: 14.2 % (ref 11.8–14.4)
PLATELET # BLD: 309 K/UL (ref 138–453)
PLATELET ESTIMATE: ABNORMAL
PMV BLD AUTO: 8.4 FL (ref 8.1–13.5)
RBC # BLD: 4.35 M/UL (ref 3.95–5.11)
RBC # BLD: ABNORMAL 10*6/UL
SEG NEUTROPHILS: 54 % (ref 36–65)
SEGMENTED NEUTROPHILS ABSOLUTE COUNT: 3.6 K/UL (ref 1.5–8.1)
WBC # BLD: 6.8 K/UL (ref 3.5–11.3)
WBC # BLD: ABNORMAL 10*3/UL

## 2021-06-21 PROCEDURE — 99213 OFFICE O/P EST LOW 20 MIN: CPT | Performed by: OBSTETRICS & GYNECOLOGY

## 2021-06-21 PROCEDURE — G8427 DOCREV CUR MEDS BY ELIG CLIN: HCPCS | Performed by: OBSTETRICS & GYNECOLOGY

## 2021-06-21 PROCEDURE — 36415 COLL VENOUS BLD VENIPUNCTURE: CPT

## 2021-06-21 PROCEDURE — 84703 CHORIONIC GONADOTROPIN ASSAY: CPT

## 2021-06-21 PROCEDURE — G8419 CALC BMI OUT NRM PARAM NOF/U: HCPCS | Performed by: OBSTETRICS & GYNECOLOGY

## 2021-06-21 PROCEDURE — 85025 COMPLETE CBC W/AUTO DIFF WBC: CPT

## 2021-06-21 PROCEDURE — 1036F TOBACCO NON-USER: CPT | Performed by: OBSTETRICS & GYNECOLOGY

## 2021-06-21 NOTE — PROGRESS NOTES
DATE OF VISIT:  6/21/21    PATIENT NAME:  Sabrina Conde     YOB: 1980    REASON FOR VISIT:    Chief Complaint   Patient presents with    Pre-op Exam     Patient is being seen for pre-op, scheduled for ablation and salpingectomy on 6/25/21. Feeling well. HISTORY OF PRESENT ILLNESS:  C/O painful heavy menses with cramps and hip pain. C/O migraines several days prior to period and at end of period. Symptoms for last 4 years but getting progressivly worse. Patient has used Ibuprofen without relief. She has heart palpitations, nausea, and hot flashes. Patient recently diagnosed with low thyroid and is taking levothyroxin from PCP Renee Hernadez. Saw SS in April and usn and f/u were scheduled; usn had shown adenomyosis; Previously had disc'd adenomyosis, menorrhagia and treatment options including pg iud, ocps, ablation, tlh; pt interested in proceeding with novasure; aware of need for b/l salpingectomy at time of ablation as per manufacturers recommendations         Patient's last menstrual period was 06/03/2021. Vitals:    06/21/21 1645 06/21/21 1652   BP: (!) 146/94 (!) 140/88   Weight: 182 lb (82.6 kg)    Height: 5' 6\" (1.676 m)      Body mass index is 29.38 kg/m². Allergies   Allergen Reactions    Latex     Ceclor [Cefaclor] Rash     Current Outpatient Medications   Medication Sig Dispense Refill    ferrous sulfate 300 (60 Fe) MG/5ML syrup Take 5 mLs by mouth daily 300 mL 2    levothyroxine (SYNTHROID) 50 MCG tablet Take 1 tablet by mouth daily 90 tablet 0    ibuprofen (IBU) 600 MG tablet Take 1 tablet by mouth every 6 hours as needed for Pain With food or milk 30 tablet 0    Multiple Vitamins-Minerals (THERAPEUTIC MULTIVITAMIN-MINERALS) tablet Take 1 tablet by mouth daily       No current facility-administered medications for this visit.      Social History     Socioeconomic History    Marital status: Single     Spouse name: None    Number of children: None    Years of education: None    Highest education level: None   Occupational History    None   Tobacco Use    Smoking status: Former Smoker     Types: Cigarettes     Quit date: 2015     Years since quittin.4    Smokeless tobacco: Never Used   Vaping Use    Vaping Use: Never used   Substance and Sexual Activity    Alcohol use: Yes     Comment: social 1-2 times weekly    Drug use: No    Sexual activity: Yes     Partners: Male     Birth control/protection: Condom   Other Topics Concern    None   Social History Narrative    None     Social Determinants of Health     Financial Resource Strain:     Difficulty of Paying Living Expenses:    Food Insecurity:     Worried About Running Out of Food in the Last Year:     Ran Out of Food in the Last Year:    Transportation Needs:     Lack of Transportation (Medical):  Lack of Transportation (Non-Medical):    Physical Activity:     Days of Exercise per Week:     Minutes of Exercise per Session:    Stress:     Feeling of Stress :    Social Connections:     Frequency of Communication with Friends and Family:     Frequency of Social Gatherings with Friends and Family:     Attends [employer] Services:     Active Member of Clubs or Organizations:     Attends Club or Organization Meetings:     Marital Status:    Intimate Partner Violence:     Fear of Current or Ex-Partner:     Emotionally Abused:     Physically Abused:     Sexually Abused:        REVIEW OF SYSTEMS:  Review of Systems   Constitutional: Negative for chills and fever. Genitourinary: Positive for menstrual problem and pelvic pain. Negative for dysuria and vaginal discharge. PHYSICAL EXAM:  BP (!) 140/88   Ht 5' 6\" (1.676 m)   Wt 182 lb (82.6 kg)   LMP 2021   Breastfeeding No   BMI 29.38 kg/m²   Physical Exam  Constitutional:       Appearance: Normal appearance. HENT:      Head: Normocephalic and atraumatic. Eyes:      Extraocular Movements: Extraocular movements intact.

## 2021-06-21 NOTE — LETTER
[address] [facility]  Phone: [phone]  Fax: [address], DO        June 21, 2021     Patient: Kimberly Cantu   YOB: 1980   Date of Visit: 6/21/2021       To Whom it May Concern:    Jude Whitmore was seen in my clinic on 6/21/2021. She may return to work on 6/22/21. If you have any questions or concerns, please don't hesitate to call.     Sincerely,         Bee Marquez DO

## 2021-06-22 ENCOUNTER — OFFICE VISIT (OUTPATIENT)
Dept: PRIMARY CARE CLINIC | Age: 41
End: 2021-06-22
Payer: COMMERCIAL

## 2021-06-22 VITALS
WEIGHT: 186.2 LBS | OXYGEN SATURATION: 99 % | TEMPERATURE: 97.6 F | HEIGHT: 66 IN | SYSTOLIC BLOOD PRESSURE: 136 MMHG | RESPIRATION RATE: 18 BRPM | DIASTOLIC BLOOD PRESSURE: 74 MMHG | BODY MASS INDEX: 29.92 KG/M2 | HEART RATE: 86 BPM

## 2021-06-22 DIAGNOSIS — E03.8 SUBCLINICAL HYPOTHYROIDISM: ICD-10-CM

## 2021-06-22 DIAGNOSIS — E66.09 CLASS 1 OBESITY DUE TO EXCESS CALORIES WITHOUT SERIOUS COMORBIDITY WITH BODY MASS INDEX (BMI) OF 30.0 TO 30.9 IN ADULT: Primary | ICD-10-CM

## 2021-06-22 PROCEDURE — G8427 DOCREV CUR MEDS BY ELIG CLIN: HCPCS | Performed by: NURSE PRACTITIONER

## 2021-06-22 PROCEDURE — G8417 CALC BMI ABV UP PARAM F/U: HCPCS | Performed by: NURSE PRACTITIONER

## 2021-06-22 PROCEDURE — 1036F TOBACCO NON-USER: CPT | Performed by: NURSE PRACTITIONER

## 2021-06-22 PROCEDURE — 99214 OFFICE O/P EST MOD 30 MIN: CPT | Performed by: NURSE PRACTITIONER

## 2021-06-22 RX ORDER — PHENTERMINE HYDROCHLORIDE 37.5 MG/1
37.5 CAPSULE ORAL EVERY MORNING
Qty: 30 CAPSULE | Refills: 0 | Status: SHIPPED | OUTPATIENT
Start: 2021-06-28 | End: 2021-07-28 | Stop reason: SDUPTHER

## 2021-06-22 RX ORDER — LEVOTHYROXINE SODIUM 0.05 MG/1
50 TABLET ORAL DAILY
Qty: 90 TABLET | Refills: 3 | Status: SHIPPED | OUTPATIENT
Start: 2021-06-22 | End: 2022-06-23 | Stop reason: SDUPTHER

## 2021-06-22 SDOH — ECONOMIC STABILITY: FOOD INSECURITY: WITHIN THE PAST 12 MONTHS, YOU WORRIED THAT YOUR FOOD WOULD RUN OUT BEFORE YOU GOT MONEY TO BUY MORE.: NEVER TRUE

## 2021-06-22 SDOH — ECONOMIC STABILITY: FOOD INSECURITY: WITHIN THE PAST 12 MONTHS, THE FOOD YOU BOUGHT JUST DIDN'T LAST AND YOU DIDN'T HAVE MONEY TO GET MORE.: NEVER TRUE

## 2021-06-22 ASSESSMENT — ENCOUNTER SYMPTOMS
COUGH: 0
CONSTIPATION: 0
DIARRHEA: 0
WHEEZING: 0
RHINORRHEA: 0
VOMITING: 0
NAUSEA: 0
SHORTNESS OF BREATH: 0
ABDOMINAL PAIN: 0
SORE THROAT: 0

## 2021-06-22 ASSESSMENT — SOCIAL DETERMINANTS OF HEALTH (SDOH): HOW HARD IS IT FOR YOU TO PAY FOR THE VERY BASICS LIKE FOOD, HOUSING, MEDICAL CARE, AND HEATING?: NOT HARD AT ALL

## 2021-06-24 ENCOUNTER — ANESTHESIA EVENT (OUTPATIENT)
Dept: OPERATING ROOM | Age: 41
End: 2021-06-24
Payer: COMMERCIAL

## 2021-06-25 ENCOUNTER — HOSPITAL ENCOUNTER (OUTPATIENT)
Age: 41
Setting detail: OUTPATIENT SURGERY
Discharge: HOME | End: 2021-06-25
Attending: OBSTETRICS & GYNECOLOGY | Admitting: OBSTETRICS & GYNECOLOGY
Payer: COMMERCIAL

## 2021-06-25 ENCOUNTER — ANESTHESIA (OUTPATIENT)
Dept: OPERATING ROOM | Age: 41
End: 2021-06-25
Payer: COMMERCIAL

## 2021-06-25 VITALS
RESPIRATION RATE: 16 BRPM | SYSTOLIC BLOOD PRESSURE: 159 MMHG | BODY MASS INDEX: 28.93 KG/M2 | DIASTOLIC BLOOD PRESSURE: 99 MMHG | OXYGEN SATURATION: 97 % | HEIGHT: 66 IN | WEIGHT: 180 LBS | TEMPERATURE: 96.8 F | HEART RATE: 99 BPM

## 2021-06-25 VITALS — SYSTOLIC BLOOD PRESSURE: 152 MMHG | DIASTOLIC BLOOD PRESSURE: 95 MMHG | OXYGEN SATURATION: 97 %

## 2021-06-25 DIAGNOSIS — G89.18 POST-OP PAIN: Primary | ICD-10-CM

## 2021-06-25 PROCEDURE — 2500000003 HC RX 250 WO HCPCS: Performed by: OBSTETRICS & GYNECOLOGY

## 2021-06-25 PROCEDURE — 6370000000 HC RX 637 (ALT 250 FOR IP): Performed by: OBSTETRICS & GYNECOLOGY

## 2021-06-25 PROCEDURE — 58661 LAPAROSCOPY REMOVE ADNEXA: CPT | Performed by: OBSTETRICS & GYNECOLOGY

## 2021-06-25 PROCEDURE — 6360000002 HC RX W HCPCS: Performed by: OBSTETRICS & GYNECOLOGY

## 2021-06-25 PROCEDURE — 7100000010 HC PHASE II RECOVERY - FIRST 15 MIN: Performed by: OBSTETRICS & GYNECOLOGY

## 2021-06-25 PROCEDURE — 88302 TISSUE EXAM BY PATHOLOGIST: CPT

## 2021-06-25 PROCEDURE — 7100000011 HC PHASE II RECOVERY - ADDTL 15 MIN: Performed by: OBSTETRICS & GYNECOLOGY

## 2021-06-25 PROCEDURE — 2500000003 HC RX 250 WO HCPCS: Performed by: NURSE ANESTHETIST, CERTIFIED REGISTERED

## 2021-06-25 PROCEDURE — 2720000010 HC SURG SUPPLY STERILE: Performed by: OBSTETRICS & GYNECOLOGY

## 2021-06-25 PROCEDURE — 94664 DEMO&/EVAL PT USE INHALER: CPT

## 2021-06-25 PROCEDURE — 2709999900 HC NON-CHARGEABLE SUPPLY: Performed by: OBSTETRICS & GYNECOLOGY

## 2021-06-25 PROCEDURE — 6360000002 HC RX W HCPCS: Performed by: NURSE ANESTHETIST, CERTIFIED REGISTERED

## 2021-06-25 PROCEDURE — 3700000000 HC ANESTHESIA ATTENDED CARE: Performed by: OBSTETRICS & GYNECOLOGY

## 2021-06-25 PROCEDURE — 64488 TAP BLOCK BI INJECTION: CPT | Performed by: NURSE ANESTHETIST, CERTIFIED REGISTERED

## 2021-06-25 PROCEDURE — 2580000003 HC RX 258: Performed by: OBSTETRICS & GYNECOLOGY

## 2021-06-25 PROCEDURE — 58563 HYSTEROSCOPY ABLATION: CPT | Performed by: OBSTETRICS & GYNECOLOGY

## 2021-06-25 PROCEDURE — 88305 TISSUE EXAM BY PATHOLOGIST: CPT

## 2021-06-25 PROCEDURE — 3600000013 HC SURGERY LEVEL 3 ADDTL 15MIN: Performed by: OBSTETRICS & GYNECOLOGY

## 2021-06-25 PROCEDURE — 3600000003 HC SURGERY LEVEL 3 BASE: Performed by: OBSTETRICS & GYNECOLOGY

## 2021-06-25 PROCEDURE — 7100000001 HC PACU RECOVERY - ADDTL 15 MIN: Performed by: OBSTETRICS & GYNECOLOGY

## 2021-06-25 PROCEDURE — 7100000000 HC PACU RECOVERY - FIRST 15 MIN: Performed by: OBSTETRICS & GYNECOLOGY

## 2021-06-25 PROCEDURE — 3700000001 HC ADD 15 MINUTES (ANESTHESIA): Performed by: OBSTETRICS & GYNECOLOGY

## 2021-06-25 RX ORDER — PROPOFOL 10 MG/ML
INJECTION, EMULSION INTRAVENOUS PRN
Status: DISCONTINUED | OUTPATIENT
Start: 2021-06-25 | End: 2021-06-25 | Stop reason: SDUPTHER

## 2021-06-25 RX ORDER — ALBUTEROL SULFATE 2.5 MG/3ML
2.5 SOLUTION RESPIRATORY (INHALATION) ONCE
Status: COMPLETED | OUTPATIENT
Start: 2021-06-25 | End: 2021-06-25

## 2021-06-25 RX ORDER — KETOROLAC TROMETHAMINE 10 MG/1
10 TABLET, FILM COATED ORAL EVERY 6 HOURS PRN
Qty: 12 TABLET | Refills: 0 | Status: SHIPPED | OUTPATIENT
Start: 2021-06-25 | End: 2021-08-09

## 2021-06-25 RX ORDER — ONDANSETRON 2 MG/ML
INJECTION INTRAMUSCULAR; INTRAVENOUS PRN
Status: DISCONTINUED | OUTPATIENT
Start: 2021-06-25 | End: 2021-06-25 | Stop reason: SDUPTHER

## 2021-06-25 RX ORDER — HYDROCODONE BITARTRATE AND ACETAMINOPHEN 5; 325 MG/1; MG/1
1 TABLET ORAL EVERY 4 HOURS PRN
Qty: 18 TABLET | Refills: 0 | Status: SHIPPED | OUTPATIENT
Start: 2021-06-25 | End: 2021-06-28

## 2021-06-25 RX ORDER — KETAMINE HCL 50MG/ML(1)
SYRINGE (ML) INTRAVENOUS PRN
Status: DISCONTINUED | OUTPATIENT
Start: 2021-06-25 | End: 2021-06-25 | Stop reason: SDUPTHER

## 2021-06-25 RX ORDER — ACETAMINOPHEN 325 MG/1
650 TABLET ORAL ONCE
Status: COMPLETED | OUTPATIENT
Start: 2021-06-25 | End: 2021-06-25

## 2021-06-25 RX ORDER — ATROPA BELLADONNA AND OPIUM 16.2; 6 MG/1; MG/1
SUPPOSITORY RECTAL PRN
Status: DISCONTINUED | OUTPATIENT
Start: 2021-06-25 | End: 2021-06-25 | Stop reason: ALTCHOICE

## 2021-06-25 RX ORDER — HYDROCODONE BITARTRATE AND ACETAMINOPHEN 5; 325 MG/1; MG/1
2 TABLET ORAL PRN
Status: COMPLETED | OUTPATIENT
Start: 2021-06-25 | End: 2021-06-25

## 2021-06-25 RX ORDER — FENTANYL CITRATE 50 UG/ML
25 INJECTION, SOLUTION INTRAMUSCULAR; INTRAVENOUS EVERY 5 MIN PRN
Status: DISCONTINUED | OUTPATIENT
Start: 2021-06-25 | End: 2021-06-25 | Stop reason: HOSPADM

## 2021-06-25 RX ORDER — METOCLOPRAMIDE HYDROCHLORIDE 5 MG/ML
10 INJECTION INTRAMUSCULAR; INTRAVENOUS
Status: COMPLETED | OUTPATIENT
Start: 2021-06-25 | End: 2021-06-25

## 2021-06-25 RX ORDER — PROMETHAZINE HYDROCHLORIDE 25 MG/ML
12.5 INJECTION, SOLUTION INTRAMUSCULAR; INTRAVENOUS EVERY 4 HOURS PRN
Status: DISCONTINUED | OUTPATIENT
Start: 2021-06-25 | End: 2021-06-25 | Stop reason: HOSPADM

## 2021-06-25 RX ORDER — SODIUM CHLORIDE, SODIUM LACTATE, POTASSIUM CHLORIDE, CALCIUM CHLORIDE 600; 310; 30; 20 MG/100ML; MG/100ML; MG/100ML; MG/100ML
INJECTION, SOLUTION INTRAVENOUS CONTINUOUS
Status: DISCONTINUED | OUTPATIENT
Start: 2021-06-25 | End: 2021-06-25 | Stop reason: HOSPADM

## 2021-06-25 RX ORDER — FENTANYL CITRATE 50 UG/ML
50 INJECTION, SOLUTION INTRAMUSCULAR; INTRAVENOUS EVERY 5 MIN PRN
Status: DISCONTINUED | OUTPATIENT
Start: 2021-06-25 | End: 2021-06-25 | Stop reason: HOSPADM

## 2021-06-25 RX ORDER — ONDANSETRON 2 MG/ML
4 INJECTION INTRAMUSCULAR; INTRAVENOUS
Status: COMPLETED | OUTPATIENT
Start: 2021-06-25 | End: 2021-06-25

## 2021-06-25 RX ORDER — DIMENHYDRINATE 50 MG/1
50 TABLET ORAL ONCE
Status: COMPLETED | OUTPATIENT
Start: 2021-06-25 | End: 2021-06-25

## 2021-06-25 RX ORDER — MIDAZOLAM HYDROCHLORIDE 1 MG/ML
INJECTION INTRAMUSCULAR; INTRAVENOUS PRN
Status: DISCONTINUED | OUTPATIENT
Start: 2021-06-25 | End: 2021-06-25 | Stop reason: SDUPTHER

## 2021-06-25 RX ORDER — ATROPA BELLADONNA AND OPIUM 16.2; 3 MG/1; MG/1
SUPPOSITORY RECTAL
Status: DISCONTINUED
Start: 2021-06-25 | End: 2021-06-25 | Stop reason: HOSPADM

## 2021-06-25 RX ORDER — LIDOCAINE HYDROCHLORIDE 20 MG/ML
INJECTION, SOLUTION EPIDURAL; INFILTRATION; INTRACAUDAL; PERINEURAL PRN
Status: DISCONTINUED | OUTPATIENT
Start: 2021-06-25 | End: 2021-06-25 | Stop reason: SDUPTHER

## 2021-06-25 RX ORDER — CIPROFLOXACIN 2 MG/ML
400 INJECTION, SOLUTION INTRAVENOUS
Status: COMPLETED | OUTPATIENT
Start: 2021-06-25 | End: 2021-06-25

## 2021-06-25 RX ORDER — DEXAMETHASONE SODIUM PHOSPHATE 4 MG/ML
INJECTION, SOLUTION INTRA-ARTICULAR; INTRALESIONAL; INTRAMUSCULAR; INTRAVENOUS; SOFT TISSUE PRN
Status: DISCONTINUED | OUTPATIENT
Start: 2021-06-25 | End: 2021-06-25 | Stop reason: SDUPTHER

## 2021-06-25 RX ORDER — HYDROCODONE BITARTRATE AND ACETAMINOPHEN 5; 325 MG/1; MG/1
1 TABLET ORAL PRN
Status: COMPLETED | OUTPATIENT
Start: 2021-06-25 | End: 2021-06-25

## 2021-06-25 RX ORDER — SUCCINYLCHOLINE/SOD CL,ISO/PF 100 MG/5ML
SYRINGE (ML) INTRAVENOUS PRN
Status: DISCONTINUED | OUTPATIENT
Start: 2021-06-25 | End: 2021-06-25 | Stop reason: SDUPTHER

## 2021-06-25 RX ADMIN — METRONIDAZOLE 500 MG: 500 INJECTION, SOLUTION INTRAVENOUS at 07:51

## 2021-06-25 RX ADMIN — ACETAMINOPHEN 650 MG: 325 TABLET ORAL at 06:47

## 2021-06-25 RX ADMIN — LIDOCAINE HYDROCHLORIDE 80 MG: 20 INJECTION, SOLUTION EPIDURAL; INFILTRATION; INTRACAUDAL; PERINEURAL at 08:03

## 2021-06-25 RX ADMIN — DIMENHYDRINATE 50 MG: 50 TABLET ORAL at 06:47

## 2021-06-25 RX ADMIN — ONDANSETRON 4 MG: 2 INJECTION INTRAMUSCULAR; INTRAVENOUS at 09:11

## 2021-06-25 RX ADMIN — Medication 50 MG: at 08:03

## 2021-06-25 RX ADMIN — MIDAZOLAM 2 MG: 1 INJECTION INTRAMUSCULAR; INTRAVENOUS at 07:29

## 2021-06-25 RX ADMIN — CIPROFLOXACIN 400 MG: 2 INJECTION, SOLUTION INTRAVENOUS at 08:11

## 2021-06-25 RX ADMIN — ONDANSETRON 4 MG: 2 INJECTION INTRAMUSCULAR; INTRAVENOUS at 08:20

## 2021-06-25 RX ADMIN — METOCLOPRAMIDE 10 MG: 5 INJECTION, SOLUTION INTRAMUSCULAR; INTRAVENOUS at 09:05

## 2021-06-25 RX ADMIN — ALBUTEROL SULFATE 2.5 MG: 2.5 SOLUTION RESPIRATORY (INHALATION) at 07:43

## 2021-06-25 RX ADMIN — DEXAMETHASONE SODIUM PHOSPHATE 4 MG: 4 INJECTION, SOLUTION INTRAMUSCULAR; INTRAVENOUS at 08:20

## 2021-06-25 RX ADMIN — PROPOFOL 200 MG: 10 INJECTION, EMULSION INTRAVENOUS at 08:03

## 2021-06-25 RX ADMIN — SODIUM CHLORIDE, POTASSIUM CHLORIDE, SODIUM LACTATE AND CALCIUM CHLORIDE: 600; 310; 30; 20 INJECTION, SOLUTION INTRAVENOUS at 06:55

## 2021-06-25 RX ADMIN — Medication 50 MG: at 08:14

## 2021-06-25 RX ADMIN — HYDROCODONE BITARTRATE AND ACETAMINOPHEN 1 TABLET: 5; 325 TABLET ORAL at 09:54

## 2021-06-25 RX ADMIN — Medication 80 MG: at 08:07

## 2021-06-25 ASSESSMENT — PAIN DESCRIPTION - LOCATION
LOCATION: VAGINA
LOCATION: VAGINA

## 2021-06-25 ASSESSMENT — PAIN DESCRIPTION - PAIN TYPE
TYPE: SURGICAL PAIN
TYPE: SURGICAL PAIN

## 2021-06-25 ASSESSMENT — PAIN SCALES - GENERAL
PAINLEVEL_OUTOF10: 5
PAINLEVEL_OUTOF10: 0
PAINLEVEL_OUTOF10: 5
PAINLEVEL_OUTOF10: 5
PAINLEVEL_OUTOF10: 0

## 2021-06-25 ASSESSMENT — PAIN DESCRIPTION - DESCRIPTORS
DESCRIPTORS: BURNING
DESCRIPTORS: BURNING

## 2021-06-25 ASSESSMENT — LIFESTYLE VARIABLES: SMOKING_STATUS: 0

## 2021-06-25 NOTE — ANESTHESIA PRE PROCEDURE
surgical complication involving left eye associated with ophthalmic procedure H59.89       Past Medical History:        Diagnosis Date    Esophageal stenosis     Thyroid disease        Past Surgical History:        Procedure Laterality Date    ESOPHAGUS SURGERY      stretched x 8    UPPER GASTROINTESTINAL ENDOSCOPY         Social History:    Social History     Tobacco Use    Smoking status: Former Smoker     Types: Cigarettes     Quit date: 2015     Years since quittin.4    Smokeless tobacco: Never Used   Substance Use Topics    Alcohol use: Yes     Comment: social 1-2 times weekly                                Counseling given: Not Answered      Vital Signs (Current):   Vitals:    21 1605 21 0630   BP:  (!) 167/97   Pulse:  84   Resp:  18   Temp:  36.1 °C (97 °F)   TempSrc:  Temporal   SpO2:  98%   Weight: 178 lb (80.7 kg) 180 lb (81.6 kg)   Height: 5' 6\" (1.676 m) 5' 6\" (1.676 m)                                              BP Readings from Last 3 Encounters:   21 (!) 167/97   21 136/74   21 (!) 140/88       NPO Status: Time of last liquid consumption:                         Time of last solid consumption:                         Date of last liquid consumption: 21                        Date of last solid food consumption: 21    BMI:   Wt Readings from Last 3 Encounters:   21 180 lb (81.6 kg)   21 186 lb 3.2 oz (84.5 kg)   21 182 lb (82.6 kg)     Body mass index is 29.05 kg/m².     CBC:   Lab Results   Component Value Date    WBC 6.8 2021    RBC 4.35 2021    HGB 12.7 2021    HCT 38.1 2021    MCV 87.6 2021    RDW 14.2 2021     2021       CMP:   Lab Results   Component Value Date     03/10/2021    K 4.1 03/10/2021     03/10/2021    CO2 22 03/10/2021    BUN 15 03/10/2021    CREATININE 0.50 03/10/2021    GFRAA >60 03/10/2021    LABGLOM >60 03/10/2021    GLUCOSE 101

## 2021-06-25 NOTE — H&P
HISTORY AND PHYSICAL             Date: 6/25/2021        Patient Name: Shannan Sheppard     YOB: 1980      Age:  47 y.o. Chief Complaint   No chief complaint on file. History Obtained From   patient    History of Present Illness   Pt with painful heavy menses for the last 4 years    Past Medical History     Past Medical History:   Diagnosis Date    Esophageal stenosis     Thyroid disease         Past Surgical History     Past Surgical History:   Procedure Laterality Date    ESOPHAGUS SURGERY      stretched x 8    UPPER GASTROINTESTINAL ENDOSCOPY          Medications Prior to Admission     Prior to Admission medications    Medication Sig Start Date End Date Taking? Authorizing Provider   levothyroxine (SYNTHROID) 50 MCG tablet Take 1 tablet by mouth daily 6/22/21  Yes CLOTILDE Mcmullen CNP   Multiple Vitamins-Minerals (THERAPEUTIC MULTIVITAMIN-MINERALS) tablet Take 1 tablet by mouth daily   Yes Historical Provider, MD   phentermine (ADIPEX-P) 37.5 MG capsule Take 1 capsule by mouth every morning for 30 days.  DO NOT FILL UNTIL 06/28/2021 6/28/21 7/28/21  CLOTILDE Mcmullen CNP   ferrous sulfate 300 (60 Fe) MG/5ML syrup Take 5 mLs by mouth daily  Patient not taking: Reported on 6/22/2021 3/24/21   CLOTILDE Mcmullen CNP   ibuprofen (IBU) 600 MG tablet Take 1 tablet by mouth every 6 hours as needed for Pain With food or milk 8/22/18   Dolphus Achilles, DO        Allergies   Latex and Ceclor [cefaclor]    Social History     Social History     Tobacco History     Smoking Status  Former Smoker Quit date  1/1/2015 Smoking Tobacco Type  Cigarettes    Smokeless Tobacco Use  Never Used          Alcohol History     Alcohol Use Status  Yes Comment  social 1-2 times weekly          Drug Use     Drug Use Status  No          Sexual Activity     Sexually Active  Yes Partners  Male Birth Control/Protection  Condom                Family History     Family History   Problem Relation Age of Onset  No Known Problems Mother     Cancer Father     Hypertension Father     Heart Disease Father     Hypertension Sister    Elizabet Demarco Other Other         No family history of breast or ovarian cancer. Pat. uncle DVT. Review of Systems   Review of Systems   Constitutional: Negative for chills and fever. Genitourinary: Positive for menstrual problem. Physical Exam   BP (!) 167/97   Pulse 84   Temp 97 °F (36.1 °C) (Temporal)   Resp 18   Ht 5' 6\" (1.676 m)   Wt 180 lb (81.6 kg)   LMP 06/03/2021   SpO2 98%   BMI 29.05 kg/m²     Physical Exam  Constitutional:       Appearance: Normal appearance. HENT:      Head: Normocephalic and atraumatic. Eyes:      Extraocular Movements: Extraocular movements intact. Pupils: Pupils are equal, round, and reactive to light. Cardiovascular:      Rate and Rhythm: Normal rate. Pulmonary:      Effort: Pulmonary effort is normal.   Musculoskeletal:         General: Normal range of motion. Cervical back: Normal range of motion. Skin:     General: Skin is warm and dry. Neurological:      Mental Status: She is alert and oriented to person, place, and time. Psychiatric:         Mood and Affect: Mood normal.         Behavior: Behavior normal.         Thought Content: Thought content normal.         Judgment: Judgment normal.         Labs    No results found for this or any previous visit (from the past 24 hour(s)). Imaging/Diagnostics Last 24 Hours   No results found. Assessment      Menorrhagia  Dysmenorrhea    Plan   1.  Hysteroscopy D&C Paul with medically indicated bilateral salpingectomy    Consultations Ordered:  None    Electronically signed by Devante Williamson DO on 6/25/21 at 7:39 AM EDT

## 2021-06-25 NOTE — OP NOTE
Preoperative diagnosis: Menorrhagia     Postoperative diagnosis: Same     Procedure: Hysteroscopy D&C Novasure endometrial ablation with medically indicated laparoscopic bilateral salpingectomy    Surgeon:  Dr. Sapna Brown    Assistant: Nereida Hope    Anesthesia:  general    EBL:  10 mL    Fluids:  as per anesthesia    Condition:  stable    Complications:  none    Speciamens:  Bilateral tubes, endometrial currettings    Findings:  AV ut 9 cm; cavity 6 x 4 cm; no fibroids or polyps noted in endometrium; tubes and ovaries grossly normal    Description of Procedure: The patient was moved to the operative suite where she was placed under general anesthesia without difficulty. Time out was performed. She was prepped and draped in the usual fashion in a dorsal lithotomy position utilizing [employer] A weighted speculum was placed and the anterior aspect of the cervix was grasped with a single tooth tenaculum. The cervix was progressively dilated. The uterus was sounded to 9 cm and a  Eyeviewer manipulator was placed. Gloves were then changed and attention was turned to the abdomen. A 5 millimeter infraumbilical skin incision was made. A Veress needle was carefully introduced at a 45 degree angle while tenting the abdominal wall. Intra-abdominal placement was confirmed by use of a water-filled syringe and drop in intra-abdominal pressure with insufflation of CO2. Pneumoperitoneum was obtained with about 2-1/2 L of CO2. A 5 mm step port was then placed without difficulty and intra-abdominal placement was confirmed by laparoscopy. Inspection of the patient's pelvis revealed the findings noted above and underlying structures were noted to be without trauma. Under direct visualization at 5 mm trocar was placed medial to the left ASIS and an 8 mm trocar was placed medial to the right ASIS. Both fallopian tubes were traced from their cornual insertion to the fimbriated ends.   The uterus was deviated laterally  with the manipulator to gain access to the bilateral fallopian tubes. The fallopian tube on the left was mobilized and grasped. The mesosalpinx was tented and utilizing the LigaSure in a stepwise fashion it was ligated and transected along the entire length of the fallopian tube. The isthmus of the tube was then ligated and transected in a similar manner. Hemostasis was maintained throughout. The fallopian tube was removed through the port. Excellent hemostasis was achieved. The same procedure was completed on the contralateral side. Re-inspection of the bilateral mesenteric edge and tubal stumps were hemostatic. The bilateral tubes were sent to pathology. All instruments and excess gas was removed from the abdomen and the trocars were removed without difficulty. The skin incisions were closed with 4-0 vicryl in a subcuticular fashion and dressed with steri-strips and a sterile dressing. A weighted speculum was placed along the posterior vaginal wall and the  cervix was visualized. The uterine manipulator was removed. The  hysteroscope was introduced with a proliferative lining being noted. The hysteroscope was then withdrawn and a sharp curettage was performed to sample the endometrium. The cervix was dilated to a # 24 and the NovaSure array was introduced. A seating technique was utilized to find the overall cavity width. A CO2 test was performed and passed. The ablation was then carried out at 132 leung  for 79 seconds. At the completion of the ablation the array was withdrawn into its protective sheath and removed from the uterus. The tenaculum was released with hemostasis being noted and the weighted speculum was removed. The patient was awakened from anesthesia. The patient tolerated the procedure well and was taken to PACU in stable condition. All sponge, needle and instrument counts were correct x 2.

## 2021-06-25 NOTE — PROGRESS NOTES
Selina Gibson CRNA notified of patients last blood pressure. States patient may go home. Discharge Criteria    Inpatients must meet Criteria 1 through 7. All other patients are either YES or N/A. If a NO is chosen then Anesthesia or Surgeon must be notified. 1.  Minimum 30 minutes after last dose of sedative medication, minimum 120 minutes after last dose of reversal agent. Yes      2. Systolic BP stable within 20 mmHg for 30 minutes & systolic BP between 90 & 068 or within 10 mmHg of baseline. Yes      3. Pulse between 60 and 100 or within 10 bpm of baseline. Yes      4. Spontaneous respiratory rate >/= 10 per minute. Yes      5. SaO2 >/= 95 or  >/= baseline. Yes      6. Able to cough and swallow or return to baseline function. Yes      7. Alert and oriented or return to baseline mental status. Yes      8. Demonstrates controlled, coordinated movements, ambulates with steady gait, or return to baseline activity function. Yes      9. Minimal or no pain or nausea, or at a level tolerable and acceptable to patient. Yes      10. Takes and retains oral fluids as allowed. Yes      11. Procedural / perioperative site stable. Minimal or no bleeding. Yes          12. If GI endoscopy procedure, minimal or no abdominal distention or passing flatus. N/A      13. Written discharge instructions and emergency telephone number provided. Yes      14. Accompanied by a responsible adult.     Yes

## 2021-06-25 NOTE — ANESTHESIA POSTPROCEDURE EVALUATION
Department of Anesthesiology  Postprocedure Note    Patient: Sarah Martell  MRN: [id number]  YOB: 1980  Date of evaluation: 6/25/2021  Time:  11:15 AM     Procedure Summary     Date: 06/25/21 Room / Location: [address]    Anesthesia Start: 7804 Anesthesia Stop: 4889    Procedures:       SALPINGECTOMY LAPAROSCOPIC (Bilateral )      DILATATION AND CURETTAGE HYSTEROSCOPY CAUTERY Isiah Jacobo (N/A ) Diagnosis: (MENORRHAGIA, ADENOMYOSIS, DYSMENORRHEA)    Surgeons: Miles Barry DO Responsible Provider: CLOTILDE Torres CRNA    Anesthesia Type: general ASA Status: 3          Anesthesia Type: general    Alonso Phase I: Alonso Score: 10    Alonso Phase II: Alonso Score: 10    Last vitals: Reviewed and per EMR flowsheets.        Anesthesia Post Evaluation    Patient location during evaluation: PACU  Patient participation: complete - patient participated  Level of consciousness: awake and alert  Airway patency: patent  Nausea & Vomiting: no nausea and no vomiting  Complications: no  Cardiovascular status: blood pressure returned to baseline and hemodynamically stable  Respiratory status: acceptable and room air  Hydration status: euvolemic

## 2021-06-28 LAB — SURGICAL PATHOLOGY REPORT: NORMAL

## 2021-07-16 NOTE — PROGRESS NOTES
Left message to call back regarding pending testing.
04/20/21. Assessment and Plan          Diagnosis Orders   1. Menorrhagia with regular cycle  C.trachomatis N.gonorrhoeae DNA, Thin Prep    naproxen sodium (ANAPROX) 550 MG tablet    US NON OB TRANSVAGINAL   2. Dysmenorrhea  naproxen sodium (ANAPROX) 550 MG tablet   3. Menstrual migraine with status migrainosus, not intractable     4. Screening for cervical cancer  PAP SMEAR   5. Screening mammogram, encounter for  [facility] LONNIE DIGITAL SCREEN BILATERAL       patient is done childbearing, \"want this all over\", is not interested in ocp use and would rather approach surgically      I am having Isa Conde start on naproxen sodium. I am also having her maintain her therapeutic multivitamin-minerals, ibuprofen, montelukast, ferrous sulfate, and levothyroxine. Return in about 3 weeks (around 5/11/2021) for gyn US for heavy bad periods with f/u Dr. Farrell [place] discuss surgical options. There are no Patient Instructions on file for this visit. Over 50% of time spent on counseling and care coordination on: see assessment and plan,  She was also counseled on her preventative health maintenance recommendations and follow-up.         FF time: 30 min      Todd Lovelace,4/20/2021 11:43 AM

## 2021-07-28 ENCOUNTER — NURSE ONLY (OUTPATIENT)
Dept: PRIMARY CARE CLINIC | Age: 41
End: 2021-07-28

## 2021-07-28 VITALS
HEART RATE: 105 BPM | SYSTOLIC BLOOD PRESSURE: 132 MMHG | HEIGHT: 66 IN | TEMPERATURE: 97.2 F | OXYGEN SATURATION: 98 % | BODY MASS INDEX: 27.74 KG/M2 | DIASTOLIC BLOOD PRESSURE: 84 MMHG | WEIGHT: 172.6 LBS | RESPIRATION RATE: 18 BRPM

## 2021-07-28 DIAGNOSIS — E66.09 CLASS 1 OBESITY DUE TO EXCESS CALORIES WITHOUT SERIOUS COMORBIDITY WITH BODY MASS INDEX (BMI) OF 30.0 TO 30.9 IN ADULT: ICD-10-CM

## 2021-07-28 RX ORDER — PHENTERMINE HYDROCHLORIDE 37.5 MG/1
37.5 CAPSULE ORAL EVERY MORNING
Qty: 30 CAPSULE | Refills: 0 | Status: SHIPPED | OUTPATIENT
Start: 2021-07-28 | End: 2021-08-27

## 2021-07-28 NOTE — PATIENT INSTRUCTIONS
SURVEY:    You may be receiving a survey from [employer] regarding your visit today. Please complete the survey to enable us to provide the highest quality of care to you and your family. If you cannot score us a very good on any question, please call the office to discuss how we could have made your experience a very good one. Thank you.   Mar Hernadez, APRN-ANASTASIIA Mei, APRN-CNP  JANAY Ruelas LPN [place], SUSANNE Martino, PCA

## 2021-08-09 ENCOUNTER — OFFICE VISIT (OUTPATIENT)
Dept: OBGYN | Age: 41
End: 2021-08-09
Payer: COMMERCIAL

## 2021-08-09 VITALS
DIASTOLIC BLOOD PRESSURE: 68 MMHG | BODY MASS INDEX: 27.48 KG/M2 | WEIGHT: 171 LBS | HEIGHT: 66 IN | SYSTOLIC BLOOD PRESSURE: 112 MMHG

## 2021-08-09 DIAGNOSIS — N92.0 MENORRHAGIA WITH REGULAR CYCLE: Primary | ICD-10-CM

## 2021-08-09 PROCEDURE — G8417 CALC BMI ABV UP PARAM F/U: HCPCS | Performed by: OBSTETRICS & GYNECOLOGY

## 2021-08-09 PROCEDURE — G8427 DOCREV CUR MEDS BY ELIG CLIN: HCPCS | Performed by: OBSTETRICS & GYNECOLOGY

## 2021-08-09 PROCEDURE — 99213 OFFICE O/P EST LOW 20 MIN: CPT | Performed by: OBSTETRICS & GYNECOLOGY

## 2021-08-09 PROCEDURE — 1036F TOBACCO NON-USER: CPT | Performed by: OBSTETRICS & GYNECOLOGY

## 2021-08-09 NOTE — PROGRESS NOTES
DATE OF VISIT:  21    PATIENT NAME:  Kala Conde     YOB: 1980    REASON FOR VISIT:    Chief Complaint   Patient presents with    Follow-up     Patient is being seen today for post-op after ablation on 21. Patient is feeling well,  she notes off and on bleeding that has never stopped. HISTORY OF PRESENT ILLNESS:  Pt had ablation with medically necessary b/l salpingectomy   - she states that she has had off and on bleeding since then; denies vaginal irritation or cramping; reviewed pics and pathology; pt declines pelvic pain; disc'd expectant management; reviewed typical outcomes      Patient's last menstrual period was 06/15/2021 (approximate). Vitals:    21 1713   BP: 112/68   Weight: 171 lb (77.6 kg)   Height: 5' 6\" (1.676 m)     Body mass index is 27.6 kg/m². Allergies   Allergen Reactions    Latex     Ceclor [Cefaclor] Rash     Current Outpatient Medications   Medication Sig Dispense Refill    phentermine (ADIPEX-P) 37.5 MG capsule Take 1 capsule by mouth every morning for 30 days. 30 capsule 0    levothyroxine (SYNTHROID) 50 MCG tablet Take 1 tablet by mouth daily 90 tablet 3    ferrous sulfate 300 (60 Fe) MG/5ML syrup Take 5 mLs by mouth daily 300 mL 2    Multiple Vitamins-Minerals (THERAPEUTIC MULTIVITAMIN-MINERALS) tablet Take 1 tablet by mouth daily       No current facility-administered medications for this visit.      Social History     Socioeconomic History    Marital status: Single     Spouse name: None    Number of children: None    Years of education: None    Highest education level: None   Occupational History    None   Tobacco Use    Smoking status: Former Smoker     Types: Cigarettes     Quit date: 2015     Years since quittin.6    Smokeless tobacco: Never Used   Vaping Use    Vaping Use: Never used   Substance and Sexual Activity    Alcohol use: Yes     Comment: social 1-2 times weekly    Drug use: No    Sexual Affect: Mood normal.         Behavior: Behavior normal.         Thought Content: Thought content normal.         Judgment: Judgment normal.         ASSESSMENT:      1. Menorrhagia with regular cycle        PLAN:  No orders of the defined types were placed in this encounter.     Return in about 8 months (around 4/9/2022) for annual.       Electronically signed by Julio Ballesteros DO on 08/09/21

## 2021-10-13 ENCOUNTER — HOSPITAL ENCOUNTER (OUTPATIENT)
Dept: LAB | Age: 41
Discharge: HOME | End: 2021-10-13
Payer: COMMERCIAL

## 2021-10-13 ENCOUNTER — OFFICE VISIT (OUTPATIENT)
Dept: PRIMARY CARE CLINIC | Age: 41
End: 2021-10-13
Payer: COMMERCIAL

## 2021-10-13 VITALS
HEIGHT: 66 IN | WEIGHT: 170.6 LBS | HEART RATE: 102 BPM | SYSTOLIC BLOOD PRESSURE: 128 MMHG | BODY MASS INDEX: 27.42 KG/M2 | RESPIRATION RATE: 18 BRPM | OXYGEN SATURATION: 99 % | TEMPERATURE: 97.4 F | DIASTOLIC BLOOD PRESSURE: 100 MMHG

## 2021-10-13 DIAGNOSIS — R52 GENERALIZED BODY ACHES: ICD-10-CM

## 2021-10-13 DIAGNOSIS — R10.30 LOWER ABDOMINAL PAIN: ICD-10-CM

## 2021-10-13 DIAGNOSIS — R68.83 CHILLS: ICD-10-CM

## 2021-10-13 DIAGNOSIS — R51.9 ACUTE NONINTRACTABLE HEADACHE, UNSPECIFIED HEADACHE TYPE: ICD-10-CM

## 2021-10-13 DIAGNOSIS — Z20.822 SUSPECTED COVID-19 VIRUS INFECTION: Primary | ICD-10-CM

## 2021-10-13 PROCEDURE — U0005 INFEC AGEN DETEC AMPLI PROBE: HCPCS

## 2021-10-13 PROCEDURE — 99214 OFFICE O/P EST MOD 30 MIN: CPT | Performed by: NURSE PRACTITIONER

## 2021-10-13 PROCEDURE — U0003 INFECTIOUS AGENT DETECTION BY NUCLEIC ACID (DNA OR RNA); SEVERE ACUTE RESPIRATORY SYNDROME CORONAVIRUS 2 (SARS-COV-2) (CORONAVIRUS DISEASE [COVID-19]), AMPLIFIED PROBE TECHNIQUE, MAKING USE OF HIGH THROUGHPUT TECHNOLOGIES AS DESCRIBED BY CMS-2020-01-R: HCPCS

## 2021-10-13 PROCEDURE — G8484 FLU IMMUNIZE NO ADMIN: HCPCS | Performed by: NURSE PRACTITIONER

## 2021-10-13 PROCEDURE — G8417 CALC BMI ABV UP PARAM F/U: HCPCS | Performed by: NURSE PRACTITIONER

## 2021-10-13 PROCEDURE — 1036F TOBACCO NON-USER: CPT | Performed by: NURSE PRACTITIONER

## 2021-10-13 PROCEDURE — C9803 HOPD COVID-19 SPEC COLLECT: HCPCS

## 2021-10-13 PROCEDURE — G8427 DOCREV CUR MEDS BY ELIG CLIN: HCPCS | Performed by: NURSE PRACTITIONER

## 2021-10-13 RX ORDER — ONDANSETRON 4 MG/1
4 TABLET, FILM COATED ORAL 3 TIMES DAILY PRN
Qty: 15 TABLET | Refills: 0 | Status: SHIPPED | OUTPATIENT
Start: 2021-10-13

## 2021-10-13 ASSESSMENT — ENCOUNTER SYMPTOMS
ALLERGIC/IMMUNOLOGIC NEGATIVE: 1
SORE THROAT: 1
SHORTNESS OF BREATH: 0
VOMITING: 0
ABDOMINAL PAIN: 1
DIARRHEA: 0
CONSTIPATION: 0
NAUSEA: 1
RHINORRHEA: 0
COUGH: 0
EYES NEGATIVE: 1

## 2021-10-13 NOTE — PATIENT INSTRUCTIONS
SURVEY:    You may be receiving a survey from [employer] regarding your visit today. Please complete the survey to enable us to provide the highest quality of care to you and your family. If you cannot score us a very good on any question, please call the office to discuss how we could have made your experience a very good one. Thank you.   Toñito Hernadez, APRN-ANASTASIIA Zacarias, ANASTASIIA Ahuja, JANAY Velez, LPN [place], [place]  Claire, PCA

## 2021-10-13 NOTE — LETTER
[address] [facility], [address] [facility]      CLOTILDE Montes CNP      10/13/2021     Patient: Wilfrido Yanes   YOB: 1980       To Whom It May Concern: It is my medical opinion that Wilfrido Yanes should remain out of school/work while acutely ill and awaiting COVID-19 test results. Return to school/work with no retesting should be followed if test is negative AND meets these 3 criteria as outlined by CDC/ODH:     a. No fever without the use of fever reducers for 24 hours  b. Improvement in symptoms  c. At least 7 days since the onset of symptoms. If tests positive for COVID-19, needs minimum of 10 days strict quarantine, improvement of symptoms and 24 hours fever free without fever reducing medications. If you have any questions or concerns, please don't hesitate to call.     Sincerely,        CLOTILDE Montes CNP

## 2021-10-13 NOTE — PROGRESS NOTES
[address] [facility] WALK-IN [facility]  [address] [facility] [address] [facility]  Dept: [phone]  Dept Fax: [phone]    Bethany Bianchi is a 46 y.o. female who presents to the [facility] in Care today for her medical conditions/complaints as noted below. Bethany Bianchi is c/o of Generalized Body Aches (x 3 days. ), Chills (x 3 days. ), Abdominal Cramping (x 3 days. ), and Headache (x 3 days. )      HPI:    Bethany Bianchi is a 46 y.o. female who presents with  Body aches, chills, nausea, headache, and abdominal cramping for 3 days. Last night temperature was 101. 3. No diarrhea or vomiting. Feels very nauseated. Has lost her appetite. She has taken Motrin and Tylenol with no improvement. Drinking fluids but not drinking well. She states normal bowel movements. Denies increased gas. States she is bloated. States that the pain is all across the lower abdomen. Past Medical History:   Diagnosis Date    Esophageal stenosis     Thyroid disease         Current Outpatient Medications   Medication Sig Dispense Refill    ondansetron (ZOFRAN) 4 MG tablet Take 1 tablet by mouth 3 times daily as needed for Nausea or Vomiting 15 tablet 0    levothyroxine (SYNTHROID) 50 MCG tablet Take 1 tablet by mouth daily 90 tablet 3    Multiple Vitamins-Minerals (THERAPEUTIC MULTIVITAMIN-MINERALS) tablet Take 1 tablet by mouth daily      ferrous sulfate 300 (60 Fe) MG/5ML syrup Take 5 mLs by mouth daily (Patient not taking: Reported on 10/13/2021) 300 mL 2     No current facility-administered medications for this visit. Allergies   Allergen Reactions    Latex     Ceclor [Cefaclor] Rash       Subjective:      Review of Systems   Constitutional: Positive for appetite change, chills, diaphoresis, fatigue and fever. HENT: Positive for sore throat (little bit that started yesterday). Negative for rhinorrhea. Eyes: Negative.     Respiratory: Negative for cough and shortness of breath. Cardiovascular: Negative. Gastrointestinal: Positive for abdominal pain and nausea. Negative for constipation, diarrhea and vomiting. Endocrine: Negative. Genitourinary: Negative. Musculoskeletal: Positive for myalgias. Skin: Negative. Allergic/Immunologic: Negative. Neurological: Positive for dizziness, light-headedness and headaches. Hematological: Negative. Psychiatric/Behavioral: Negative. Objective:     Physical Exam  Vitals and nursing note reviewed. Constitutional:       Appearance: Normal appearance. HENT:      Head: Normocephalic. Right Ear: External ear normal.      Left Ear: External ear normal.      Nose: Rhinorrhea present. Rhinorrhea is clear. Mouth/Throat:      Lips: Pink. Mouth: Mucous membranes are moist.      Pharynx: Oropharynx is clear. Eyes:      Conjunctiva/sclera: Conjunctivae normal.      Pupils: Pupils are equal, round, and reactive to light. Cardiovascular:      Rate and Rhythm: Normal rate and regular rhythm. Heart sounds: Normal heart sounds. Pulmonary:      Effort: Pulmonary effort is normal.      Breath sounds: Normal breath sounds. Abdominal:      General: Bowel sounds are increased. Palpations: Abdomen is soft. Tenderness: There is abdominal tenderness in the right lower quadrant. There is no right CVA tenderness, left CVA tenderness or guarding. Negative signs include Sapp's sign and McBurney's sign. Musculoskeletal:         General: Normal range of motion. Cervical back: Normal range of motion. Skin:     General: Skin is warm. Capillary Refill: Capillary refill takes less than 2 seconds. Neurological:      General: No focal deficit present. Mental Status: She is alert and oriented to person, place, and time.    Psychiatric:         Mood and Affect: Mood normal.       BP (!) 128/100 (Site: Right Upper Arm, Position: Sitting, Cuff Size: Large Adult)   Pulse 102 Temp 97.4 °F (36.3 °C) (Temporal)   Resp 18   Ht 5' 6\" (1.676 m)   Wt 170 lb 9.6 oz (77.4 kg)   SpO2 99%   BMI 27.54 kg/m²     Assessment:      Diagnosis Orders   1. Suspected COVID-19 virus infection     2. Lower abdominal pain  US ABDOMEN LIMITED   3. Generalized body aches  COVID-19   4. Chills  COVID-19   5. Acute nonintractable headache, unspecified headache type  COVID-19     No results found for this visit on 10/13/21. Plan:   Increase fluids and rest.  Tylenol and Ibuprofen as directed on package for headache and fever. Zofran as prescribed for abdominal pain. Will call with Covid test results. Work note provided. Will call with abdominal US results. Go to the Emergency Room if increased abdominal pain, unable to keep fluids down, or any difficulty breathing. No follow-ups on file.     Orders Placed This Encounter   Medications    ondansetron (ZOFRAN) 4 MG tablet     Sig: Take 1 tablet by mouth 3 times daily as needed for Nausea or Vomiting     Dispense:  15 tablet     Refill:  0        Electronically signed by CLOTILDE Dickson CNP on 10/13/2021 at 10:35 AM

## 2021-10-14 ENCOUNTER — TELEPHONE (OUTPATIENT)
Dept: PRIMARY CARE CLINIC | Age: 41
End: 2021-10-14

## 2021-10-14 LAB
SARS-COV-2: NORMAL
SARS-COV-2: NOT DETECTED
SOURCE: NORMAL

## 2021-10-14 NOTE — TELEPHONE ENCOUNTER
----- Message from CLOTILDE Pa CNP sent at 10/14/2021 10:29 AM EDT -----  Please notify patient of negative Covid test.  Ask them how they are feeling today. This is most likely a viral illness and may take 10-14 days before symptoms resolve. Continue supportive treatment at home. May return to school/work after symptoms improve and no fever for 24 hours.   Thanks Padmini

## 2021-10-14 NOTE — TELEPHONE ENCOUNTER
Contacted patient in regards to negative COVID result. Patient informed writer her abdominal pain is still there. Writer informed patient to have Ultrasound completed or to go to ED if abdominal pain was worsening. Patient verbalized understanding.

## 2022-02-07 ENCOUNTER — TELEPHONE (OUTPATIENT)
Dept: PRIMARY CARE CLINIC | Age: 42
End: 2022-02-07

## 2022-04-07 ENCOUNTER — TELEPHONE (OUTPATIENT)
Dept: PRIMARY CARE CLINIC | Age: 42
End: 2022-04-07

## 2022-04-07 ENCOUNTER — OFFICE VISIT (OUTPATIENT)
Dept: PRIMARY CARE CLINIC | Age: 42
End: 2022-04-07
Payer: COMMERCIAL

## 2022-04-07 VITALS
TEMPERATURE: 97.5 F | RESPIRATION RATE: 18 BRPM | WEIGHT: 178 LBS | BODY MASS INDEX: 28.73 KG/M2 | SYSTOLIC BLOOD PRESSURE: 128 MMHG | HEART RATE: 100 BPM | OXYGEN SATURATION: 98 % | DIASTOLIC BLOOD PRESSURE: 84 MMHG

## 2022-04-07 DIAGNOSIS — H66.002 NON-RECURRENT ACUTE SUPPURATIVE OTITIS MEDIA OF LEFT EAR WITHOUT SPONTANEOUS RUPTURE OF TYMPANIC MEMBRANE: ICD-10-CM

## 2022-04-07 DIAGNOSIS — J01.40 ACUTE NON-RECURRENT PANSINUSITIS: Primary | ICD-10-CM

## 2022-04-07 LAB
INFLUENZA A ANTIBODY: NEGATIVE
INFLUENZA B ANTIBODY: NEGATIVE

## 2022-04-07 PROCEDURE — 87804 INFLUENZA ASSAY W/OPTIC: CPT | Performed by: NURSE PRACTITIONER

## 2022-04-07 PROCEDURE — 99213 OFFICE O/P EST LOW 20 MIN: CPT | Performed by: NURSE PRACTITIONER

## 2022-04-07 PROCEDURE — 1036F TOBACCO NON-USER: CPT | Performed by: NURSE PRACTITIONER

## 2022-04-07 PROCEDURE — G8417 CALC BMI ABV UP PARAM F/U: HCPCS | Performed by: NURSE PRACTITIONER

## 2022-04-07 PROCEDURE — G8427 DOCREV CUR MEDS BY ELIG CLIN: HCPCS | Performed by: NURSE PRACTITIONER

## 2022-04-07 RX ORDER — AMOXICILLIN AND CLAVULANATE POTASSIUM 875; 125 MG/1; MG/1
1 TABLET, FILM COATED ORAL 2 TIMES DAILY
Qty: 20 TABLET | Refills: 0 | Status: SHIPPED | OUTPATIENT
Start: 2022-04-07 | End: 2022-04-07 | Stop reason: HOSPADM

## 2022-04-07 RX ORDER — DEXTROMETHORPHAN HYDROBROMIDE, GUAIFENESIN AND PSEUDOEPHEDRINE HYDROCHLORIDE 15; 400; 60 MG/1; MG/1; MG/1
1 TABLET ORAL EVERY 6 HOURS PRN
Qty: 28 TABLET | Refills: 0 | Status: SHIPPED | OUTPATIENT
Start: 2022-04-07 | End: 2022-04-14

## 2022-04-07 RX ORDER — AMOXICILLIN AND CLAVULANATE POTASSIUM 875; 125 MG/1; MG/1
1 TABLET, FILM COATED ORAL 2 TIMES DAILY
Qty: 20 TABLET | Refills: 0 | Status: SHIPPED | OUTPATIENT
Start: 2022-04-07 | End: 2022-04-17

## 2022-04-07 RX ORDER — DEXTROMETHORPHAN HYDROBROMIDE, GUAIFENESIN AND PSEUDOEPHEDRINE HYDROCHLORIDE 15; 400; 60 MG/1; MG/1; MG/1
1 TABLET ORAL EVERY 6 HOURS PRN
Qty: 28 TABLET | Refills: 0 | Status: SHIPPED | OUTPATIENT
Start: 2022-04-07 | End: 2022-04-07 | Stop reason: HOSPADM

## 2022-04-07 ASSESSMENT — ENCOUNTER SYMPTOMS
RHINORRHEA: 1
WHEEZING: 0
ALLERGIC/IMMUNOLOGIC NEGATIVE: 1
SORE THROAT: 1
EYES NEGATIVE: 1
SINUS PRESSURE: 1
SHORTNESS OF BREATH: 1
COUGH: 1
SINUS PAIN: 1
GASTROINTESTINAL NEGATIVE: 1

## 2022-04-07 ASSESSMENT — PATIENT HEALTH QUESTIONNAIRE - PHQ9
SUM OF ALL RESPONSES TO PHQ QUESTIONS 1-9: 0
SUM OF ALL RESPONSES TO PHQ9 QUESTIONS 1 & 2: 0
SUM OF ALL RESPONSES TO PHQ QUESTIONS 1-9: 0
1. LITTLE INTEREST OR PLEASURE IN DOING THINGS: 0
2. FEELING DOWN, DEPRESSED OR HOPELESS: 0

## 2022-04-07 NOTE — PATIENT INSTRUCTIONS
SURVEY:     You may be receiving a survey from [employer] regarding your visit today. Please complete the survey to enable us to provide the highest quality of care to you and your family. If you cannot score us a very good on any question, please call the office to discuss how we could have made your experience a very good one. Thank you. Isidra Hernadez, APRN-ANASTASIIA Cullen, CNP  Frandy Darden, LPN  Leyla Mendez, LPN  Elias Shore, CMA  Tonna Riley, CMA  Vianca, CMA  Claire, PCA    Patient Education        Sinusitis: Care Instructions  Your Care Instructions     Sinusitis is an infection of the lining of the sinus cavities in your head. Sinusitis often follows a cold. It causes pain and pressure in your head andface. In most cases, sinusitis gets better on its own in 1 to 2 weeks. But some mildsymptoms may last for several weeks. Sometimes antibiotics are needed. Follow-up care is a key part of your treatment and safety. Be sure to make and go to all appointments, and call your doctor if you are having problems. It's also a good idea to know your test results and keep alist of the medicines you take. How can you care for yourself at home?  Take an over-the-counter pain medicine, such as acetaminophen (Tylenol), ibuprofen (Advil, Motrin), or naproxen (Aleve). Read and follow all instructions on the label.  If the doctor prescribed antibiotics, take them as directed. Do not stop taking them just because you feel better. You need to take the full course of antibiotics.  Be careful when taking over-the-counter cold or flu medicines and Tylenol at the same time. Many of these medicines have acetaminophen, which is Tylenol. Read the labels to make sure that you are not taking more than the recommended dose. Too much acetaminophen (Tylenol) can be harmful.  Breathe warm, moist air from a steamy shower, a hot bath, or a sink filled with hot water. Avoid cold, dry air. Using a humidifier in your home may help.  Follow the directions for cleaning the machine.  Use saline (saltwater) nasal washes. This can help keep your nasal passages open and wash out mucus and bacteria. You can buy saline nose drops at a grocery store or drugstore. Or you can make your own at home by adding 1 teaspoon of salt and 1 teaspoon of baking soda to 2 cups of distilled water. If you make your own, fill a bulb syringe with the solution, insert the tip into your nostril, and squeeze gently. Kalli Liner your nose.  Put a hot, wet towel or a warm gel pack on your face 3 or 4 times a day for 5 to 10 minutes each time.  Try a decongestant nasal spray like oxymetazoline (Afrin). Do not use it for more than 3 days in a row. Using it for more than 3 days can make your congestion worse. When should you call for help? Call your doctor now or seek immediate medical care if:     You have new or worse swelling or redness in your face or around your eyes.      You have a new or higher fever. Watch closely for changes in your health, and be sure to contact your doctor if:     You have new or worse facial pain.      The mucus from your nose becomes thicker (like pus) or has new blood in it.      You are not getting better as expected. Where can you learn more? Go to https://[employer]. org and sign in to your [employer] account. Enter N790 in the [facility] box to learn more about \"Sinusitis: Care Instructions. \"     If you do not have an account, please click on the \"Sign Up Now\" link. Current as of: September 8, 2021               Content Version: 13.2  © 5554-8606 Healthwise, Incorporated. Care instructions adapted under license by [facility] ([facility]). If you have questions about a medical condition or this instruction, always ask your healthcare professional. [address] any warranty or liability for your use of this information.

## 2022-04-07 NOTE — TELEPHONE ENCOUNTER
Call patient and let her know she is not allergic to Augmentin. She has an allergy to Ceclor which is like a cousin we will say to Augmentin. She has had penicillin in the past and did fine so she should be fine with Augmentin. [facility] to [facility].

## 2022-04-07 NOTE — PROGRESS NOTES
[facility]  Maira Bateman, [address] [facility]  [address] [facility] [address] [place] [address]  Dept: [phone]  Dept Fax: [phone]      Name: Allie Owens  : 1980         Chief Complaint:     Chief Complaint   Patient presents with    Headache     x 5 days.  Congestion     x 5 days.  Cough     x 5 days.  Drainage     x 5 days. History of Present Illness:      Allie Owens is a 46 y.o.  female who presents with Headache (x 5 days. ), Congestion (x 5 days. ), Cough (x 5 days. ), and Drainage (x 5 days. )    [place] is here today for cough and congestion for 5 days. She states she is feeling worse. She has a headache, sinus pain and pressure. She has some dull ear pain. She has a sore throat. Has body aches and fatigue. States she has lost her appetite. She mainly has a dry cough but at times is productive. The cough did just start yesterday. Yesterday had a fever of 101.2. She has been taking Tylenol cold and flu and Ibuprofen. Past Medical History:     Past Medical History:   Diagnosis Date    Esophageal stenosis     Thyroid disease       Reviewed all health maintenance requirements and ordered appropriate tests  Health Maintenance Due   Topic Date Due    Diabetes screen  Never done    Depression Screen  2022       Past Surgical History:     Past Surgical History:   Procedure Laterality Date    DILATION AND CURETTAGE OF UTERUS N/A 2021    DILATATION AND CURETTAGE HYSTEROSCOPY CAUTERY ABLATION, NOVASURE performed by Cirilo Escoto DO at [facility]      stretched x 8    SALPINGECTOMY Bilateral 2021    WITH DR. FAULKNER    SALPINGECTOMY Bilateral 2021    SALPINGECTOMY LAPAROSCOPIC performed by Cirilo Escoto DO at [facility]          Medications:       Prior to Admission medications    Medication Sig Start Date End Date Taking? Authorizing Provider   amoxicillin-clavulanate (AUGMENTIN) 875-125 MG per tablet Take 1 tablet by mouth 2 times daily for 10 days 4/7/22 4/17/22 Yes CLOTILDE Wilkes CNP   Pseudoephedrine-DM-GG (CAPMIST DM) 60- MG TABS Take 1 tablet by mouth every 6 hours as needed (Sinus pressure) 4/7/22 4/14/22 Yes CLOTILDE Wilkes CNP   levothyroxine (SYNTHROID) 50 MCG tablet Take 1 tablet by mouth daily 6/22/21  Yes CLOTILDE Patiño CNP   ferrous sulfate 300 (60 Fe) MG/5ML syrup Take 5 mLs by mouth daily 3/24/21  Yes CLOTILDE Patiño CNP   Multiple Vitamins-Minerals (THERAPEUTIC MULTIVITAMIN-MINERALS) tablet Take 1 tablet by mouth daily   Yes Historical Provider, MD   ondansetron (ZOFRAN) 4 MG tablet Take 1 tablet by mouth 3 times daily as needed for Nausea or Vomiting  Patient not taking: Reported on 4/7/2022 10/13/21   CLOTILDE Wilkes CNP        Allergies:       Latex and Ceclor [cefaclor]    Social History:     Tobacco:    reports that she quit smoking about 7 years ago. Her smoking use included cigarettes. She has never used smokeless tobacco.  Alcohol:      reports current alcohol use. Drug Use:  reports no history of drug use. Family History:     Family History   Problem Relation Age of Onset    No Known Problems Mother     Cancer Father     Hypertension Father     Heart Disease Father     Hypertension Sister     Other Other         No family history of breast or ovarian cancer. Pat. uncle DVT. Review of Systems:     Positive and Negative as described in HPI    Review of Systems   Constitutional: Positive for appetite change, chills, diaphoresis, fatigue and fever. HENT: Positive for congestion, ear pain, rhinorrhea, sinus pressure, sinus pain and sore throat. Eyes: Negative. Respiratory: Positive for cough and shortness of breath. Negative for wheezing. Cardiovascular: Negative. Gastrointestinal: Negative. Endocrine: Negative.     Genitourinary: Negative. Musculoskeletal: Positive for myalgias. Skin: Negative. Allergic/Immunologic: Negative. Neurological: Positive for headaches. Hematological: Negative. Psychiatric/Behavioral: Negative. Physical Exam:   Vitals:  /84   Pulse 100   Temp 97.5 °F (36.4 °C) (Temporal)   Resp 18   Wt 178 lb (80.7 kg)   SpO2 98%   BMI 28.73 kg/m²     Physical Exam  Vitals and nursing note reviewed. Constitutional:       Appearance: Normal appearance. HENT:      Head: Normocephalic. Right Ear: Tympanic membrane normal.      Left Ear: Tympanic membrane is injected and erythematous. Nose: Nasal tenderness, mucosal edema, congestion and rhinorrhea present. Rhinorrhea is clear. Right Sinus: Maxillary sinus tenderness and frontal sinus tenderness present. Left Sinus: Maxillary sinus tenderness and frontal sinus tenderness present. Mouth/Throat:      Lips: Pink. Mouth: Mucous membranes are moist.      Pharynx: Posterior oropharyngeal erythema present. Eyes:      Conjunctiva/sclera: Conjunctivae normal.      Pupils: Pupils are equal, round, and reactive to light. Cardiovascular:      Rate and Rhythm: Normal rate and regular rhythm. Heart sounds: Normal heart sounds. Pulmonary:      Effort: Pulmonary effort is normal.      Breath sounds: Normal breath sounds. Musculoskeletal:         General: Normal range of motion. Cervical back: Normal range of motion. Lymphadenopathy:      Cervical: Cervical adenopathy (anterior) present. Skin:     General: Skin is warm. Capillary Refill: Capillary refill takes less than 2 seconds. Neurological:      General: No focal deficit present. Mental Status: She is alert and oriented to person, place, and time.    Psychiatric:         Mood and Affect: Mood normal.         Data:     Lab Results   Component Value Date     03/10/2021    K 4.1 03/10/2021     03/10/2021    CO2 22 03/10/2021    BUN 15 03/10/2021    CREATININE 0.50 03/10/2021    GLUCOSE 101 03/10/2021    PROT 7.4 03/10/2021    LABALBU 4.3 03/10/2021    BILITOT 0.30 03/10/2021    ALKPHOS 89 03/10/2021    AST 22 03/10/2021    ALT 21 03/10/2021     Lab Results   Component Value Date    WBC 6.8 06/21/2021    RBC 4.35 06/21/2021    HGB 12.7 06/21/2021    HCT 38.1 06/21/2021    MCV 87.6 06/21/2021    MCH 29.2 06/21/2021    MCHC 33.3 06/21/2021    RDW 14.2 06/21/2021     06/21/2021    MPV 8.4 06/21/2021     Lab Results   Component Value Date    TSH 1.40 06/14/2021     Lab Results   Component Value Date    CHOL 179 03/10/2021    HDL 62 03/10/2021       Assessment/Plan:      Diagnosis Orders   1. Acute non-recurrent pansinusitis  amoxicillin-clavulanate (AUGMENTIN) 875-125 MG per tablet    Pseudoephedrine-DM-GG (CAPMIST DM) 60- MG TABS    POCT Influenza A/B   2. Non-recurrent acute suppurative otitis media of left ear without spontaneous rupture of tympanic membrane  amoxicillin-clavulanate (AUGMENTIN) 875-125 MG per tablet     · Practice meticulous handwashing and cover cough to prevent spread of infection  · Encouraged to increase fluids and rest  · Tylenol/Ibuprofen OTC PRN for pain, discomfort or fever as directed on package  · Warm salt water gargles for sore throat  · Cool mist humidifier  · Augmentin and Capmist as prescribed. · Hot tea with honey and lemon for cough and sore throat PRN  · Patient instructions given for sinusitis. · To ER or call 911 if any difficulty breathing, shortness of breath, inability to swallow, hives, rash, facial/tongue swelling or temp greater than 103 degrees.       Requested Prescriptions     Signed Prescriptions Disp Refills    amoxicillin-clavulanate (AUGMENTIN) 875-125 MG per tablet 20 tablet 0     Sig: Take 1 tablet by mouth 2 times daily for 10 days    Pseudoephedrine-DM-GG (CAPMIST DM) 60- MG TABS 28 tablet 0     Sig: Take 1 tablet by mouth every 6 hours as needed (Sinus pressure)         No follow-ups on file.

## 2022-04-07 NOTE — TELEPHONE ENCOUNTER
Patient called in and said pharmacy said the antibiotic that was sent in she is allergic to something that is in it if she could have another antibiotic sent in? Also the pharmacy didn't have the sudafed so can all he medications [facility] [place]?    Thanks

## 2022-04-07 NOTE — LETTER
[facility] [place]  [address] [facility]  [place] [address] [facility]  Phone: [phone]  Fax: CLOTILDE Ward CNP        April 7, 2022     Patient: Shannan Sheppard   YOB: 1980   Date of Visit: 4/7/2022       To Whom it May Concern:    Kashif Schaefer was seen in my clinic on 4/7/2022. She may return to work on 4/8/2022. If you have any questions or concerns, please don't hesitate to call.     Sincerely,         CLOTILDE Finnegan CNP

## 2022-04-12 ENCOUNTER — TELEPHONE (OUTPATIENT)
Dept: PRIMARY CARE CLINIC | Age: 42
End: 2022-04-12

## 2022-04-12 NOTE — TELEPHONE ENCOUNTER
Called to ask patient about appointment she has Thursday 4/14/22. Not sure what the appointment is for needed verification.

## 2022-05-23 ENCOUNTER — OFFICE VISIT (OUTPATIENT)
Dept: PRIMARY CARE CLINIC | Age: 42
End: 2022-05-23
Payer: COMMERCIAL

## 2022-05-23 VITALS
SYSTOLIC BLOOD PRESSURE: 152 MMHG | RESPIRATION RATE: 18 BRPM | OXYGEN SATURATION: 100 % | HEIGHT: 66 IN | HEART RATE: 82 BPM | TEMPERATURE: 98.6 F | BODY MASS INDEX: 29.6 KG/M2 | WEIGHT: 184.2 LBS | DIASTOLIC BLOOD PRESSURE: 114 MMHG

## 2022-05-23 DIAGNOSIS — H65.191 ACUTE EFFUSION OF RIGHT EAR: Primary | ICD-10-CM

## 2022-05-23 DIAGNOSIS — J06.9 VIRAL URI WITH COUGH: ICD-10-CM

## 2022-05-23 PROCEDURE — 1036F TOBACCO NON-USER: CPT | Performed by: NURSE PRACTITIONER

## 2022-05-23 PROCEDURE — G8427 DOCREV CUR MEDS BY ELIG CLIN: HCPCS | Performed by: NURSE PRACTITIONER

## 2022-05-23 PROCEDURE — 99214 OFFICE O/P EST MOD 30 MIN: CPT | Performed by: NURSE PRACTITIONER

## 2022-05-23 PROCEDURE — G8417 CALC BMI ABV UP PARAM F/U: HCPCS | Performed by: NURSE PRACTITIONER

## 2022-05-23 RX ORDER — AMOXICILLIN AND CLAVULANATE POTASSIUM 875; 125 MG/1; MG/1
1 TABLET, FILM COATED ORAL 2 TIMES DAILY
Qty: 10 TABLET | Refills: 0 | Status: SHIPPED | OUTPATIENT
Start: 2022-05-23 | End: 2022-05-28

## 2022-05-23 ASSESSMENT — PATIENT HEALTH QUESTIONNAIRE - PHQ9
SUM OF ALL RESPONSES TO PHQ QUESTIONS 1-9: 0
SUM OF ALL RESPONSES TO PHQ9 QUESTIONS 1 & 2: 0
1. LITTLE INTEREST OR PLEASURE IN DOING THINGS: 0
2. FEELING DOWN, DEPRESSED OR HOPELESS: 0
SUM OF ALL RESPONSES TO PHQ QUESTIONS 1-9: 0

## 2022-05-23 ASSESSMENT — ENCOUNTER SYMPTOMS
SORE THROAT: 1
RHINORRHEA: 1
GASTROINTESTINAL NEGATIVE: 1
COUGH: 1
EYES NEGATIVE: 1
SHORTNESS OF BREATH: 1

## 2022-05-23 NOTE — PATIENT INSTRUCTIONS
SURVEY:     You may be receiving a survey from [employer] regarding your visit today. Please complete the survey to enable us to provide the highest quality of care to you and your family. If you cannot score us a very good on any question, please call the office to discuss how we could have made your experience a very good one. Thank you.   Jewel Hernadez, APRN-ANASTASIIA De Paz, ANASTASIIA Zamora, JANAY Amin, ESSENCE Broderick, ESSENCE Coley, CMA  Claire, PCA

## 2022-05-23 NOTE — PROGRESS NOTES
[facility]  Debo Marroquin, [address] [facility]  [address] [facility] [address] [place] [address]  Dept: [phone]  Dept Fax: [phone]      Name: Merline Balzarine  : 1980         Chief Complaint:     Chief Complaint   Patient presents with    Pharyngitis     c/o since yesterday     Otalgia     both ears    Generalized Body Aches     c/o since yesterday        History of Present Illness:      Merline Balzarine is a 46 y.o.  female who presents with Pharyngitis (c/o since yesterday ), Otalgia (both ears), and Generalized Body Aches (c/o since yesterday )    [place] is here today for sore throat, body aches, fever and chills that started yesterday. She has lost her appetite. She states she has a slight dry cough. She has had fatigue. She denies nausea. She has had some shortness of breath. She has taken 3 Augmentin that she had left over at home and Motrin. She has 1 Augmentin left. 140/98 BP on recheck manually    Past Medical History:     Past Medical History:   Diagnosis Date    Esophageal stenosis     Thyroid disease       Reviewed all health maintenance requirements and ordered appropriate tests  Health Maintenance Due   Topic Date Due    Diabetes screen  Never done       Past Surgical History:     Past Surgical History:   Procedure Laterality Date    DILATION AND CURETTAGE OF UTERUS N/A 2021    DILATATION AND CURETTAGE HYSTEROSCOPY CAUTERY ABLATION, NOVASURE performed by Shelby Dueñas DO at [facility]      stretched x 8    SALPINGECTOMY Bilateral 2021    WITH DR. FAULKNER    SALPINGECTOMY Bilateral 2021    SALPINGECTOMY LAPAROSCOPIC performed by Shelby Dueñas DO at [facility]          Medications:       Prior to Admission medications    Medication Sig Start Date End Date Taking?  Authorizing Provider   amoxicillin-clavulanate (AUGMENTIN) 875-125 MG per tablet Take 1 tablet by mouth 2 times daily for 5 days 5/23/22 5/28/22 Yes CLOTILDE Turner CNP   levothyroxine (SYNTHROID) 50 MCG tablet Take 1 tablet by mouth daily 6/22/21  Yes CLOTILDE Block CNP   ferrous sulfate 300 (60 Fe) MG/5ML syrup Take 5 mLs by mouth daily 3/24/21  Yes Elner Rival Might, APRN - CNP   Multiple Vitamins-Minerals (THERAPEUTIC MULTIVITAMIN-MINERALS) tablet Take 1 tablet by mouth daily   Yes Historical Provider, MD   ondansetron (ZOFRAN) 4 MG tablet Take 1 tablet by mouth 3 times daily as needed for Nausea or Vomiting  Patient not taking: Reported on 4/7/2022 10/13/21   CLOTILDE Turner CNP        Allergies:       Latex and Ceclor [cefaclor]    Social History:     Tobacco:    reports that she quit smoking about 7 years ago. Her smoking use included cigarettes. She has never used smokeless tobacco.  Alcohol:      reports current alcohol use. Drug Use:  reports no history of drug use. Family History:     Family History   Problem Relation Age of Onset    No Known Problems Mother     Cancer Father     Hypertension Father     Heart Disease Father     Hypertension Sister     Other Other         No family history of breast or ovarian cancer. Pat. uncle DVT. Review of Systems:     Positive and Negative as described in HPI    Review of Systems   Constitutional: Positive for appetite change, chills, diaphoresis and fatigue. HENT: Positive for ear pain, rhinorrhea and sore throat. Eyes: Negative. Respiratory: Positive for cough and shortness of breath. Cardiovascular: Negative. Gastrointestinal: Negative. Endocrine: Negative. Genitourinary: Negative. Musculoskeletal: Positive for myalgias. Skin: Negative. Allergic/Immunologic: Positive for environmental allergies. Neurological: Positive for headaches. Hematological: Negative. Psychiatric/Behavioral: Negative.         Physical Exam:   Vitals:  BP (!) 152/114   Pulse 82   Temp 98.6 °F (37 °C) (Temporal)   Resp 18   Ht 5' 6\" (1.676 m)   Wt 184 lb 3.2 oz (83.6 kg)   SpO2 100%   BMI 29.73 kg/m²     Physical Exam  Vitals and nursing note reviewed. Constitutional:       Appearance: Normal appearance. HENT:      Head: Normocephalic. Right Ear: A middle ear effusion is present. Left Ear: Tympanic membrane normal.      Nose: Rhinorrhea present. No congestion. Rhinorrhea is clear. Mouth/Throat:      Lips: Pink. Mouth: Mucous membranes are moist.      Pharynx: Posterior oropharyngeal erythema present. Eyes:      Conjunctiva/sclera: Conjunctivae normal.      Pupils: Pupils are equal, round, and reactive to light. Cardiovascular:      Rate and Rhythm: Normal rate and regular rhythm. Heart sounds: Normal heart sounds. Pulmonary:      Effort: Pulmonary effort is normal.      Breath sounds: Normal breath sounds. Musculoskeletal:         General: Normal range of motion. Cervical back: Normal range of motion. Lymphadenopathy:      Cervical: Cervical adenopathy (anterior) present. Skin:     General: Skin is warm. Capillary Refill: Capillary refill takes less than 2 seconds. Neurological:      General: No focal deficit present. Mental Status: She is alert.    Psychiatric:         Mood and Affect: Mood normal.         Data:     Lab Results   Component Value Date     03/10/2021    K 4.1 03/10/2021     03/10/2021    CO2 22 03/10/2021    BUN 15 03/10/2021    CREATININE 0.50 03/10/2021    GLUCOSE 101 03/10/2021    PROT 7.4 03/10/2021    LABALBU 4.3 03/10/2021    BILITOT 0.30 03/10/2021    ALKPHOS 89 03/10/2021    AST 22 03/10/2021    ALT 21 03/10/2021     Lab Results   Component Value Date    WBC 6.8 06/21/2021    RBC 4.35 06/21/2021    HGB 12.7 06/21/2021    HCT 38.1 06/21/2021    MCV 87.6 06/21/2021    MCH 29.2 06/21/2021    MCHC 33.3 06/21/2021    RDW 14.2 06/21/2021     06/21/2021    MPV 8.4 06/21/2021     Lab Results   Component Value Date TSH 1.40 06/14/2021     Lab Results   Component Value Date    CHOL 179 03/10/2021    HDL 62 03/10/2021       Assessment/Plan:      Diagnosis Orders   1. Acute effusion of right ear     2. Viral URI with cough       · Practice meticulous handwashing and cover cough to prevent spread of infection  · Encouraged to increase fluids and rest  · Tylenol/Ibuprofen OTC PRN for pain, discomfort or fever as directed on package  · Warm salt water gargles for sore throat  · Cool mist humidifier  · Augmentin prescribed for 5 days to finish the treatment she started. Did discuss with patient to always take all medications as prescribed. Verbalized understanding. · Hot tea with honey and lemon for cough and sore throat PRN  · Patient instructions given for upper respiratory infection. · To ER or call 911 if any difficulty breathing, shortness of breath, inability to swallow, hives, rash, facial/tongue swelling or temp greater than 103 degrees. Requested Prescriptions     Signed Prescriptions Disp Refills    amoxicillin-clavulanate (AUGMENTIN) 875-125 MG per tablet 10 tablet 0     Sig: Take 1 tablet by mouth 2 times daily for 5 days         Return in about 1 month (around 6/23/2022).

## 2022-05-30 ENCOUNTER — HOSPITAL ENCOUNTER (EMERGENCY)
Age: 42
Discharge: HOME | End: 2022-05-30
Attending: EMERGENCY MEDICINE
Payer: COMMERCIAL

## 2022-05-30 VITALS
SYSTOLIC BLOOD PRESSURE: 165 MMHG | OXYGEN SATURATION: 96 % | DIASTOLIC BLOOD PRESSURE: 96 MMHG | TEMPERATURE: 97.7 F | RESPIRATION RATE: 20 BRPM

## 2022-05-30 DIAGNOSIS — I10 ESSENTIAL HYPERTENSION: ICD-10-CM

## 2022-05-30 DIAGNOSIS — J02.8 ACUTE PHARYNGITIS DUE TO OTHER SPECIFIED ORGANISMS: Primary | ICD-10-CM

## 2022-05-30 LAB
ABSOLUTE EOS #: 0.31 K/UL (ref 0–0.44)
ABSOLUTE IMMATURE GRANULOCYTE: 0.03 K/UL (ref 0–0.3)
ABSOLUTE LYMPH #: 1.72 K/UL (ref 1.1–3.7)
ABSOLUTE MONO #: 0.69 K/UL (ref 0.1–1.2)
ALBUMIN SERPL-MCNC: 4.4 G/DL (ref 3.5–5.2)
ALBUMIN/GLOBULIN RATIO: 1.5 (ref 1–2.5)
ALP BLD-CCNC: 113 U/L (ref 35–104)
ALT SERPL-CCNC: 21 U/L (ref 5–33)
ANION GAP SERPL CALCULATED.3IONS-SCNC: 12 MMOL/L (ref 9–17)
AST SERPL-CCNC: 19 U/L
BASOPHILS # BLD: 0 % (ref 0–2)
BASOPHILS ABSOLUTE: 0.03 K/UL (ref 0–0.2)
BILIRUB SERPL-MCNC: 0.64 MG/DL (ref 0.3–1.2)
BUN BLDV-MCNC: 16 MG/DL (ref 6–20)
BUN/CREAT BLD: 28 (ref 9–20)
CALCIUM SERPL-MCNC: 9.3 MG/DL (ref 8.6–10.4)
CHLORIDE BLD-SCNC: 103 MMOL/L (ref 98–107)
CO2: 25 MMOL/L (ref 20–31)
CREAT SERPL-MCNC: 0.58 MG/DL (ref 0.5–0.9)
EOSINOPHILS RELATIVE PERCENT: 4 % (ref 1–4)
GFR AFRICAN AMERICAN: >60 ML/MIN
GFR NON-AFRICAN AMERICAN: >60 ML/MIN
GFR SERPL CREATININE-BSD FRML MDRD: ABNORMAL ML/MIN/{1.73_M2}
GFR SERPL CREATININE-BSD FRML MDRD: ABNORMAL ML/MIN/{1.73_M2}
GLUCOSE BLD-MCNC: 102 MG/DL (ref 70–99)
HCT VFR BLD CALC: 42.5 % (ref 36.3–47.1)
HEMOGLOBIN: 14.4 G/DL (ref 11.9–15.1)
IMMATURE GRANULOCYTES: 0 %
LYMPHOCYTES # BLD: 19 % (ref 24–43)
MCH RBC QN AUTO: 31.1 PG (ref 25.2–33.5)
MCHC RBC AUTO-ENTMCNC: 33.9 G/DL (ref 28.4–34.8)
MCV RBC AUTO: 91.8 FL (ref 82.6–102.9)
MONOCYTES # BLD: 8 % (ref 3–12)
MONONUCLEOSIS SCREEN: NEGATIVE
NRBC AUTOMATED: 0 PER 100 WBC
PDW BLD-RTO: 12.8 % (ref 11.8–14.4)
PLATELET # BLD: 401 K/UL (ref 138–453)
PMV BLD AUTO: 8.6 FL (ref 8.1–13.5)
POTASSIUM SERPL-SCNC: 3.7 MMOL/L (ref 3.7–5.3)
RBC # BLD: 4.63 M/UL (ref 3.95–5.11)
S PYO AG THROAT QL: NEGATIVE
SEG NEUTROPHILS: 69 % (ref 36–65)
SEGMENTED NEUTROPHILS ABSOLUTE COUNT: 6.14 K/UL (ref 1.5–8.1)
SODIUM BLD-SCNC: 140 MMOL/L (ref 135–144)
SOURCE: NORMAL
TOTAL PROTEIN: 7.3 G/DL (ref 6.4–8.3)
WBC # BLD: 8.9 K/UL (ref 3.5–11.3)

## 2022-05-30 PROCEDURE — 87880 STREP A ASSAY W/OPTIC: CPT

## 2022-05-30 PROCEDURE — 80053 COMPREHEN METABOLIC PANEL: CPT

## 2022-05-30 PROCEDURE — 36415 COLL VENOUS BLD VENIPUNCTURE: CPT

## 2022-05-30 PROCEDURE — 85025 COMPLETE CBC W/AUTO DIFF WBC: CPT

## 2022-05-30 PROCEDURE — 99284 EMERGENCY DEPT VISIT MOD MDM: CPT

## 2022-05-30 PROCEDURE — 6370000000 HC RX 637 (ALT 250 FOR IP): Performed by: EMERGENCY MEDICINE

## 2022-05-30 PROCEDURE — 6360000002 HC RX W HCPCS: Performed by: EMERGENCY MEDICINE

## 2022-05-30 PROCEDURE — 2580000003 HC RX 258: Performed by: EMERGENCY MEDICINE

## 2022-05-30 PROCEDURE — 96374 THER/PROPH/DIAG INJ IV PUSH: CPT

## 2022-05-30 PROCEDURE — 86308 HETEROPHILE ANTIBODY SCREEN: CPT

## 2022-05-30 RX ORDER — CLINDAMYCIN HYDROCHLORIDE 300 MG/1
300 CAPSULE ORAL 3 TIMES DAILY
Qty: 21 CAPSULE | Refills: 0 | Status: SHIPPED | OUTPATIENT
Start: 2022-05-30 | End: 2022-06-06

## 2022-05-30 RX ORDER — CLINDAMYCIN HYDROCHLORIDE 150 MG/1
150 CAPSULE ORAL ONCE
Status: COMPLETED | OUTPATIENT
Start: 2022-05-30 | End: 2022-05-30

## 2022-05-30 RX ORDER — SODIUM CHLORIDE 9 MG/ML
1000 INJECTION, SOLUTION INTRAVENOUS CONTINUOUS
Status: DISCONTINUED | OUTPATIENT
Start: 2022-05-30 | End: 2022-05-30 | Stop reason: HOSPADM

## 2022-05-30 RX ORDER — DEXAMETHASONE SODIUM PHOSPHATE 10 MG/ML
10 INJECTION INTRAMUSCULAR; INTRAVENOUS ONCE
Status: COMPLETED | OUTPATIENT
Start: 2022-05-30 | End: 2022-05-30

## 2022-05-30 RX ADMIN — Medication 4 TABLET: at 11:32

## 2022-05-30 RX ADMIN — CLINDAMYCIN HYDROCHLORIDE 150 MG: 150 CAPSULE ORAL at 11:20

## 2022-05-30 RX ADMIN — DEXAMETHASONE SODIUM PHOSPHATE 10 MG: 10 INJECTION INTRAMUSCULAR; INTRAVENOUS at 10:33

## 2022-05-30 RX ADMIN — SODIUM CHLORIDE 1000 ML: 9 INJECTION, SOLUTION INTRAVENOUS at 10:33

## 2022-05-30 ASSESSMENT — PAIN DESCRIPTION - LOCATION: LOCATION: THROAT

## 2022-05-30 ASSESSMENT — PAIN - FUNCTIONAL ASSESSMENT: PAIN_FUNCTIONAL_ASSESSMENT: 0-10

## 2022-05-30 ASSESSMENT — PAIN SCALES - GENERAL: PAINLEVEL_OUTOF10: 10

## 2022-05-30 NOTE — ED PROVIDER NOTES
[address] [place] [address] ED  EMERGENCY DEPARTMENT ENCOUNTER      Pt Name: Linnea Lucas  MRN: [id number]  [place] 1980  Date of evaluation: 5/30/2022  Provider: Yamile Babcock MD    [address] [place] [address]       Chief Complaint   Patient presents with    Pharyngitis     began 8 days ago         HISTORY OF PRESENT ILLNESS   (Location/Symptom, Timing/Onset, Context/Setting, Quality, Duration, Modifying Factors, Severity)  Note limiting factors. Linnea Lucas is a 46 y.o. female who presents to the emergency department     68-year-old patient presents emergency department with concerns of persistent sore throat for 1 week in duration. Patient had received a prescription for Augmentin which she is completing by her healthcare provider. She is known to be hypertensive. She denies any headache chest pain shortness of breath or focal weakness following upper or lower extremities. Patient denies any exposure to infectious mononucleosis          Nursing Notes were reviewed. REVIEW OF SYSTEMS    (2-9 systems for level 4, 10 or more for level 5)     Review of Systems   All other systems reviewed and are negative. Except as noted above the remainder of the review of systems was reviewed and negative. PAST MEDICAL HISTORY     Past Medical History:   Diagnosis Date    Esophageal stenosis     Thyroid disease          SURGICAL HISTORY       Past Surgical History:   Procedure Laterality Date    DILATION AND CURETTAGE OF UTERUS N/A 6/25/2021    DILATATION AND CURETTAGE HYSTEROSCOPY CAUTERY ABLATION, NOVASURE performed by Juan Jose Roger DO at [facility]      stretched x 8    SALPINGECTOMY Bilateral 06/25/2021    WITH DR. FAULKNER    SALPINGECTOMY Bilateral 6/25/2021    SALPINGECTOMY LAPAROSCOPIC performed by Juan Jose Roger DO at [facility]       Discharge Medication List as of 5/30/2022 11:07 AM CONTINUE these medications which have NOT CHANGED    Details   ondansetron (ZOFRAN) 4 MG tablet Take 1 tablet by mouth 3 times daily as needed for Nausea or Vomiting, Disp-15 tablet, R-0Normal      levothyroxine (SYNTHROID) 50 MCG tablet Take 1 tablet by mouth daily, Disp-90 tablet, R-3Normal      ferrous sulfate 300 (60 Fe) MG/5ML syrup Take 5 mLs by mouth daily, Disp-300 mL, R-2Normal      Multiple Vitamins-Minerals (THERAPEUTIC MULTIVITAMIN-MINERALS) tablet Take 1 tablet by mouth dailyHistorical Med             ALLERGIES     Latex and Ceclor [cefaclor]    FAMILY HISTORY       Family History   Problem Relation Age of Onset    No Known Problems Mother     Cancer Father     Hypertension Father     Heart Disease Father     Hypertension Sister     Other Other         No family history of breast or ovarian cancer. Pat. uncle DVT.            SOCIAL HISTORY       Social History     Socioeconomic History    Marital status: Single     Spouse name: Not on file    Number of children: Not on file    Years of education: Not on file    Highest education level: Not on file   Occupational History    Not on file   Tobacco Use    Smoking status: Former Smoker     Types: Cigarettes     Quit date: 2015     Years since quittin.4    Smokeless tobacco: Never Used   Vaping Use    Vaping Use: Never used   Substance and Sexual Activity    Alcohol use: Yes     Comment: social 1-2 times weekly    Drug use: No    Sexual activity: Not Currently     Partners: Male     Birth control/protection: Condom   Other Topics Concern    Not on file   Social History Narrative    Not on file     Social Determinants of Health     Financial Resource Strain: Low Risk     Difficulty of Paying Living Expenses: Not hard at all   Food Insecurity: No Food Insecurity    Worried About Running Out of Food in the Last Year: Never true    [place] of Food in the Last Year: Never true   Transportation Needs:     Lack of Transportation (Medical): Not on file    Lack of Transportation (Non-Medical): Not on file   Physical Activity:     Days of Exercise per Week: Not on file    Minutes of Exercise per Session: Not on file   Stress:     Feeling of Stress : Not on file   Social Connections:     Frequency of Communication with Friends and Family: Not on file    Frequency of Social Gatherings with Friends and Family: Not on file    Attends Gnosticism Services: Not on file    Active Member of [address] [place] [address] [employer] or Organizations: Not on file    Attends Club or Organization Meetings: Not on file    Marital Status: Not on file   Intimate Partner Violence:     Fear of Current or Ex-Partner: Not on file    Emotionally Abused: Not on file    Physically Abused: Not on file    Sexually Abused: Not on file   Housing Stability:     Unable to Pay for Housing in the Last Year: Not on file    Number of [place] in the Last Year: Not on file    Unstable Housing in the Last Year: Not on file       SCREENINGS        [place] Coma Scale  Eye Opening: Spontaneous  Best Verbal Response: Oriented  Best Motor Response: Obeys commands  M[place] Coma Scale Score: 15               PHYSICAL EXAM    (up to 7 for level 4, 8 or more for level 5)     ED Triage Vitals   BP Temp Temp Source Pulse Resp SpO2 Height Weight   05/30/22 0920 05/30/22 0919 05/30/22 0919 -- 05/30/22 0920 05/30/22 0920 -- --   (!) 194/97 97.7 °F (36.5 °C) Tympanic  20 99 %         Physical Exam  Vitals and nursing note reviewed. HENT:      Head: Normocephalic. Nose: No congestion or rhinorrhea. Comments: Speech is articulate, no drooling no stridor no trismus     Mouth/Throat:      Mouth: Mucous membranes are moist.      Pharynx: Posterior oropharyngeal erythema present. Tonsils: No tonsillar abscesses. Comments: No drooling no stridor no trismus  Eyes:      Conjunctiva/sclera: Conjunctivae normal.      Pupils: Pupils are equal, round, and reactive to light.       Comments: No photophobia    No limbal flush    Corneas are clear   Neck:      Thyroid: No thyromegaly. Comments: No meningeal signs      Cardiovascular:      Rate and Rhythm: Normal rate and regular rhythm. Heart sounds: Normal heart sounds. Pulmonary:      Effort: Pulmonary effort is normal.      Breath sounds: No rales. Chest:      Chest wall: No tenderness. Musculoskeletal:      Cervical back: Normal range of motion and neck supple. Lymphadenopathy:      Cervical: Cervical adenopathy present. Skin:     General: Skin is warm and dry. Capillary Refill: Capillary refill takes less than 2 seconds. Findings: No erythema or rash. Neurological:      General: No focal deficit present. Mental Status: She is alert and oriented to person, place, and time.          DIAGNOSTIC RESULTS     EKG: All EKG's are interpreted by the Emergency Department Physician who either signs or Co-signs this chart in the absence of a cardiologist.      RADIOLOGY:   Non-plain film images such as CT, Ultrasound and MRI are read by the radiologist. Plain radiographic images are visualized and preliminarily interpreted by the emergency physician with the below findings:      Interpretation per the Radiologist below, if available at the time of this note:    No orders to display         ED BEDSIDE ULTRASOUND:   Performed by ED Physician - none    LABS:  Labs Reviewed   CBC WITH AUTO DIFFERENTIAL - Abnormal; Notable for the following components:       Result Value    Seg Neutrophils 69 (*)     Lymphocytes 19 (*)     All other components within normal limits   COMPREHENSIVE METABOLIC PANEL W/ REFLEX TO MG FOR LOW K - Abnormal; Notable for the following components:    Glucose 102 (*)     Bun/Cre Ratio 28 (*)     Alkaline Phosphatase 113 (*)     All other components within normal limits   STREP SCREEN GROUP A THROAT   MONONUCLEOSIS SCREEN       All other labs were within normal range or not returned as of this dictation. EMERGENCY DEPARTMENT COURSE and DIFFERENTIAL DIAGNOSIS/MDM:   Vitals:    Vitals:    05/30/22 0919 05/30/22 0920 05/30/22 1030 05/30/22 1100   BP:  (!) 194/97 (!) 159/99 (!) 165/96   Resp:  20     Temp: 97.7 °F (36.5 °C)      TempSrc: Tympanic      SpO2:  99% 93% 96%         MDM  Number of Diagnoses or Management Options  Acute pharyngitis due to other specified organisms  Essential hypertension  Diagnosis management comments: 42-year-old female presents emergency department with concerns of persistent sore throat despite full course of antibiotic treatment (Augmentin)      Diagnosis considered peritonsillar abscess, infectious mononucleosis, tonsillitis, epiglottitis    There are no red flags    The patient refuses to have imaging studies of her neck performed i.e. CT soft tissue    Patient was treated with IV Decadron and advised to follow-up as documented-the patient will be empirically initiated on clindamycin she is aware of the risks and benefits of antibiotic administration to include but not limited to pseudomembranous colitis        REASSESSMENT     The patient's blood pressure did improve without therapeutic intervention during emergency department course-she is afebrile nontoxic pulse oximeter 96% on room air         CRITICAL CARE TIME   Total Critical Care time was minutes, excluding separately reportable procedures. There was a high probability of clinically significant/life threatening deterioration in the patient's condition which required my urgent intervention. CONSULTS:  None    PROCEDURES:  Unless otherwise noted below, none     Procedures    FINAL IMPRESSION      1. Acute pharyngitis due to other specified organisms    2.  Essential hypertension          DISPOSITION/PLAN   DISPOSITION Decision To Discharge 05/30/2022 11:42:36 AM      PATIENT REFERRED TO:  Tripp Hernadez, APRN - ANASTASIIA  [facility] [address]  [phone]    Call in 3 days        DISCHARGE MEDICATIONS:  Discharge Medication List as of 5/30/2022 11:07 AM      START taking these medications    Details   clindamycin (CLEOCIN) 300 MG capsule Take 1 capsule by mouth 3 times daily for 7 days, Disp-21 capsule, R-0Normal           Controlled Substances Monitoring:     No flowsheet data found.     (Please note that portions of this note were completed with a voice recognition program.  Efforts were made to edit the dictations but occasionally words are mis-transcribed.)    Soledad Jasso MD (electronically signed)  Attending Emergency Physician            Soledad Jasso MD  05/30/22 0120

## 2022-05-31 ENCOUNTER — TELEPHONE (OUTPATIENT)
Dept: PRIMARY CARE CLINIC | Age: 42
End: 2022-05-31

## 2022-05-31 NOTE — TELEPHONE ENCOUNTER
[facility] Initial Follow Up Call    Outreach made within 2 business days of discharge: Yes    Patient: Tara Doshi Patient : 1980   MRN: [id number]  Reason for Admission: There are no discharge diagnoses documented for the most recent discharge. Discharge Date: 22       Spoke with: [facility] department/facility: [facility]     TCM Interactive Patient Contact:  Was patient able to fill all prescriptions: Yes  Was patient instructed to bring all medications to the follow-up visit: Yes  Is patient taking all medications as directed in the discharge summary?  Yes  Does patient understand their discharge instructions: Yes  Does patient have questions or concerns that need addressed prior to 7-14 day follow up office visit: no    Scheduled appointment with PCP within 7-14 days    Follow Up  Future Appointments   Date Time Provider [place]   2022  4:30 PM CLOTILDE Becerra - CNP [facility]       Lizbeth Martínez, [address] [facility] [address] [place]

## 2022-06-23 ENCOUNTER — OFFICE VISIT (OUTPATIENT)
Dept: PRIMARY CARE CLINIC | Age: 42
End: 2022-06-23
Payer: COMMERCIAL

## 2022-06-23 VITALS
BODY MASS INDEX: 29.5 KG/M2 | DIASTOLIC BLOOD PRESSURE: 110 MMHG | TEMPERATURE: 98.3 F | SYSTOLIC BLOOD PRESSURE: 142 MMHG | OXYGEN SATURATION: 99 % | RESPIRATION RATE: 18 BRPM | HEART RATE: 77 BPM | WEIGHT: 182.8 LBS

## 2022-06-23 DIAGNOSIS — I10 PRIMARY HYPERTENSION: Primary | ICD-10-CM

## 2022-06-23 DIAGNOSIS — E66.09 CLASS 1 OBESITY DUE TO EXCESS CALORIES WITHOUT SERIOUS COMORBIDITY WITH BODY MASS INDEX (BMI) OF 30.0 TO 30.9 IN ADULT: ICD-10-CM

## 2022-06-23 DIAGNOSIS — D50.8 IRON DEFICIENCY ANEMIA SECONDARY TO INADEQUATE DIETARY IRON INTAKE: ICD-10-CM

## 2022-06-23 DIAGNOSIS — Z13.220 LIPID SCREENING: ICD-10-CM

## 2022-06-23 DIAGNOSIS — R53.83 FATIGUE, UNSPECIFIED TYPE: ICD-10-CM

## 2022-06-23 DIAGNOSIS — E03.8 SUBCLINICAL HYPOTHYROIDISM: ICD-10-CM

## 2022-06-23 PROCEDURE — 1036F TOBACCO NON-USER: CPT | Performed by: NURSE PRACTITIONER

## 2022-06-23 PROCEDURE — G8417 CALC BMI ABV UP PARAM F/U: HCPCS | Performed by: NURSE PRACTITIONER

## 2022-06-23 PROCEDURE — 99214 OFFICE O/P EST MOD 30 MIN: CPT | Performed by: NURSE PRACTITIONER

## 2022-06-23 PROCEDURE — G8427 DOCREV CUR MEDS BY ELIG CLIN: HCPCS | Performed by: NURSE PRACTITIONER

## 2022-06-23 RX ORDER — LEVOTHYROXINE SODIUM 0.05 MG/1
50 TABLET ORAL DAILY
Qty: 90 TABLET | Refills: 3 | Status: SHIPPED | OUTPATIENT
Start: 2022-06-23

## 2022-06-23 RX ORDER — LISINOPRIL 10 MG/1
10 TABLET ORAL DAILY
Qty: 90 TABLET | Refills: 1 | Status: SHIPPED | OUTPATIENT
Start: 2022-06-23

## 2022-06-23 SDOH — ECONOMIC STABILITY: FOOD INSECURITY: WITHIN THE PAST 12 MONTHS, THE FOOD YOU BOUGHT JUST DIDN'T LAST AND YOU DIDN'T HAVE MONEY TO GET MORE.: NEVER TRUE

## 2022-06-23 SDOH — ECONOMIC STABILITY: FOOD INSECURITY: WITHIN THE PAST 12 MONTHS, YOU WORRIED THAT YOUR FOOD WOULD RUN OUT BEFORE YOU GOT MONEY TO BUY MORE.: NEVER TRUE

## 2022-06-23 ASSESSMENT — PATIENT HEALTH QUESTIONNAIRE - PHQ9
SUM OF ALL RESPONSES TO PHQ QUESTIONS 1-9: 0
1. LITTLE INTEREST OR PLEASURE IN DOING THINGS: 0
SUM OF ALL RESPONSES TO PHQ QUESTIONS 1-9: 0
SUM OF ALL RESPONSES TO PHQ QUESTIONS 1-9: 0
2. FEELING DOWN, DEPRESSED OR HOPELESS: 0
SUM OF ALL RESPONSES TO PHQ9 QUESTIONS 1 & 2: 0
SUM OF ALL RESPONSES TO PHQ QUESTIONS 1-9: 0

## 2022-06-23 ASSESSMENT — ENCOUNTER SYMPTOMS
RESPIRATORY NEGATIVE: 1
GASTROINTESTINAL NEGATIVE: 1
EYES NEGATIVE: 1
ALLERGIC/IMMUNOLOGIC NEGATIVE: 1

## 2022-06-23 ASSESSMENT — SOCIAL DETERMINANTS OF HEALTH (SDOH): HOW HARD IS IT FOR YOU TO PAY FOR THE VERY BASICS LIKE FOOD, HOUSING, MEDICAL CARE, AND HEATING?: NOT HARD AT ALL

## 2022-06-23 NOTE — PROGRESS NOTES
[facility]YSIC[facility]  Dany Tobias, [address]  [address]  Dept: [phone]  Dept Fax: [phone]      Name: Deedee Stover  : 1980         Chief Complaint:     Chief Complaint   Patient presents with    Blood Pressure Check     1 month f/u. Patient has been having high blood pressure for a little over a year. History of Present Illness:      Deedee Stover is a 46 y.o.  female who presents with Blood Pressure Check (1 month f/u. Patient has been having high blood pressure for a little over a year. )    Tona Hart is here today for a 1 month follow up. She has been having elevated blood pressure. She does states she has not been good at watching her diet or exercising. She has a family history of heart disease and elevated blood pressure. She states she has been very tired and fatigued. She states she is a vegetarian and has been noncompliant taking her iron supplement. She states she does take her synthroid daily. Past Medical History:     Past Medical History:   Diagnosis Date    Esophageal stenosis     Thyroid disease       Reviewed all health maintenance requirements and ordered appropriate tests  Health Maintenance Due   Topic Date Due    Diabetes screen  Never done       Past Surgical History:     Past Surgical History:   Procedure Laterality Date    DILATION AND CURETTAGE OF UTERUS N/A 2021    DILATATION AND CURETTAGE HYSTEROSCOPY CAUTERY ABLATION, NOVASURE performed by Roberto Torres DO at [address]      stretched x 8    SALPINGECTOMY Bilateral 2021    WITH DR. FAULKNER    SALPINGECTOMY Bilateral 2021    SALPINGECTOMY LAPAROSCOPIC performed by Roberto Torres DO at [address]          Medications:       Prior to Admission medications    Medication Sig Start Date End Date Taking?  Authorizing Provider   lisinopril (PRINIVIL;ZESTRIL) 10 MG tablet Take 1 tablet by mouth daily 6/23/22  Yes CLOTILDE Acevedo CNP   levothyroxine (SYNTHROID) 50 MCG tablet Take 1 tablet by mouth daily 6/23/22  Yes CLOTILDE Acevedo CNP   ferrous sulfate 300 (60 Fe) MG/5ML syrup Take 5 mLs by mouth daily 3/24/21  Yes CLOTILDE Gipson CNP   Multiple Vitamins-Minerals (THERAPEUTIC MULTIVITAMIN-MINERALS) tablet Take 1 tablet by mouth daily   Yes Historical Provider, MD   ondansetron (ZOFRAN) 4 MG tablet Take 1 tablet by mouth 3 times daily as needed for Nausea or Vomiting  Patient not taking: Reported on 6/23/2022 10/13/21   CLOTILDE Acevedo CNP        Allergies:       Latex and Ceclor [cefaclor]    Social History:     Tobacco:    reports that she quit smoking about 7 years ago. Her smoking use included cigarettes. She has never used smokeless tobacco.  Alcohol:      reports current alcohol use. Drug Use:  reports no history of drug use. Family History:     Family History   Problem Relation Age of Onset    No Known Problems Mother     Cancer Father     Hypertension Father     Heart Disease Father     Hypertension Sister     Other Other         No family history of breast or ovarian cancer. Pat. uncle DVT. Review of Systems:     Positive and Negative as described in HPI    Review of Systems   Constitutional: Positive for fatigue. HENT: Negative. Eyes: Negative. Respiratory: Negative. Cardiovascular: Negative. Gastrointestinal: Negative. Endocrine: Negative. Genitourinary: Negative. Musculoskeletal: Negative. Skin: Negative. Allergic/Immunologic: Negative. Neurological: Negative. Hematological: Negative. Psychiatric/Behavioral: Negative. Physical Exam:   Vitals:  BP (!) 142/110   Pulse 77   Temp 98.3 °F (36.8 °C) (Temporal)   Resp 18   Wt 182 lb 12.8 oz (82.9 kg)   SpO2 99%   BMI 29.50 kg/m²     Physical Exam  Vitals and nursing note reviewed.    Constitutional: Appearance: Normal appearance. HENT:      Head: Normocephalic. Right Ear: External ear normal.      Left Ear: External ear normal.      Nose: Nose normal.      Mouth/Throat:      Mouth: Mucous membranes are moist.      Pharynx: Oropharynx is clear. Eyes:      Conjunctiva/sclera: Conjunctivae normal.      Pupils: Pupils are equal, round, and reactive to light. Cardiovascular:      Rate and Rhythm: Normal rate and regular rhythm. Heart sounds: Normal heart sounds. Pulmonary:      Effort: Pulmonary effort is normal.      Breath sounds: Normal breath sounds. Musculoskeletal:         General: Normal range of motion. Cervical back: Normal range of motion. Skin:     General: Skin is warm. Capillary Refill: Capillary refill takes less than 2 seconds. Neurological:      General: No focal deficit present. Mental Status: She is alert and oriented to person, place, and time. Psychiatric:         Mood and Affect: Mood normal.         Data:     Lab Results   Component Value Date     05/30/2022    K 3.7 05/30/2022     05/30/2022    CO2 25 05/30/2022    BUN 16 05/30/2022    CREATININE 0.58 05/30/2022    GLUCOSE 102 05/30/2022    PROT 7.3 05/30/2022    LABALBU 4.4 05/30/2022    BILITOT 0.64 05/30/2022    ALKPHOS 113 05/30/2022    AST 19 05/30/2022    ALT 21 05/30/2022     Lab Results   Component Value Date    WBC 8.9 05/30/2022    RBC 4.63 05/30/2022    HGB 14.4 05/30/2022    HCT 42.5 05/30/2022    MCV 91.8 05/30/2022    MCH 31.1 05/30/2022    MCHC 33.9 05/30/2022    RDW 12.8 05/30/2022     05/30/2022    MPV 8.6 05/30/2022     Lab Results   Component Value Date    TSH 1.40 06/14/2021     Lab Results   Component Value Date    CHOL 179 03/10/2021    HDL 62 03/10/2021       Assessment/Plan:      Diagnosis Orders   1. Primary hypertension  lisinopril (PRINIVIL;ZESTRIL) 10 MG tablet   2.  Class 1 obesity due to excess calories without serious comorbidity with body mass index (BMI) of 30.0 to 30.9 in adult     3. Subclinical hypothyroidism  TSH With Reflex Ft4    levothyroxine (SYNTHROID) 50 MCG tablet   4. Iron deficiency anemia secondary to inadequate dietary iron intake  Iron and TIBC    CBC    Basic Metabolic Panel   5. Fatigue, unspecified type  CBC    Basic Metabolic Panel    Vitamin B12 & Folate    Vitamin D 25 Hydroxy   6. Lipid screening  Lipid Panel     Primary Hypertension-Start Lisinopril 10 tablet daily. Discussed lifestyle modifications. Discussed reducing sodium intake. Follow up in 1 month. Hypothyroidism-Will recheck TSH levels and continue Synthroid as prescribed. Fatigue-Labs ordered and will call with results. 1.  Juanita Yepez received counseling on the following healthy behaviors: nutrition, exercise and medication adherence  2. Patient given educational materials - see patient instructions  3. Was a self-tracking handout given in paper form or via [employer]? No  If yes, see orders or list here. 4.  Discussed use, benefit, and side effects of prescribed medications. Barriers to medication compliance addressed. All patient questions answered. Pt voiced understanding. 5.  Reviewed prior labs and health maintenance  6. Continue current medications, diet and exercise. Completed Refills   Requested Prescriptions     Signed Prescriptions Disp Refills    lisinopril (PRINIVIL;ZESTRIL) 10 MG tablet 90 tablet 1     Sig: Take 1 tablet by mouth daily    levothyroxine (SYNTHROID) 50 MCG tablet 90 tablet 3     Sig: Take 1 tablet by mouth daily         Return in about 1 month (around 7/23/2022).

## 2022-07-09 ENCOUNTER — HOSPITAL ENCOUNTER (OUTPATIENT)
Age: 42
Discharge: HOME | End: 2022-07-09
Payer: COMMERCIAL

## 2022-07-09 DIAGNOSIS — Z13.220 LIPID SCREENING: ICD-10-CM

## 2022-07-09 DIAGNOSIS — R53.83 FATIGUE, UNSPECIFIED TYPE: ICD-10-CM

## 2022-07-09 DIAGNOSIS — E03.8 SUBCLINICAL HYPOTHYROIDISM: ICD-10-CM

## 2022-07-09 DIAGNOSIS — D50.8 IRON DEFICIENCY ANEMIA SECONDARY TO INADEQUATE DIETARY IRON INTAKE: ICD-10-CM

## 2022-07-09 LAB
ANION GAP SERPL CALCULATED.3IONS-SCNC: 10 MMOL/L (ref 9–17)
BUN BLDV-MCNC: 13 MG/DL (ref 6–20)
BUN/CREAT BLD: 23 (ref 9–20)
CALCIUM SERPL-MCNC: 9.2 MG/DL (ref 8.6–10.4)
CHLORIDE BLD-SCNC: 102 MMOL/L (ref 98–107)
CHOLESTEROL/HDL RATIO: 3.8
CHOLESTEROL: 210 MG/DL
CO2: 22 MMOL/L (ref 20–31)
CREAT SERPL-MCNC: 0.56 MG/DL (ref 0.5–0.9)
GFR AFRICAN AMERICAN: >60 ML/MIN
GFR NON-AFRICAN AMERICAN: >60 ML/MIN
GFR SERPL CREATININE-BSD FRML MDRD: ABNORMAL ML/MIN/{1.73_M2}
GFR SERPL CREATININE-BSD FRML MDRD: ABNORMAL ML/MIN/{1.73_M2}
GLUCOSE BLD-MCNC: 94 MG/DL (ref 70–99)
HCT VFR BLD CALC: 41.8 % (ref 36.3–47.1)
HDLC SERPL-MCNC: 55 MG/DL
HEMOGLOBIN: 13.9 G/DL (ref 11.9–15.1)
LDL CHOLESTEROL: 128 MG/DL (ref 0–130)
MCH RBC QN AUTO: 30.4 PG (ref 25.2–33.5)
MCHC RBC AUTO-ENTMCNC: 33.3 G/DL (ref 28.4–34.8)
MCV RBC AUTO: 91.5 FL (ref 82.6–102.9)
NRBC AUTOMATED: 0 PER 100 WBC
PDW BLD-RTO: 13.2 % (ref 11.8–14.4)
PLATELET # BLD: 358 K/UL (ref 138–453)
PMV BLD AUTO: 8.7 FL (ref 8.1–13.5)
POTASSIUM SERPL-SCNC: 4.3 MMOL/L (ref 3.7–5.3)
RBC # BLD: 4.57 M/UL (ref 3.95–5.11)
SODIUM BLD-SCNC: 134 MMOL/L (ref 135–144)
TRIGL SERPL-MCNC: 135 MG/DL
TSH SERPL DL<=0.05 MIU/L-ACNC: 1.79 UIU/ML (ref 0.3–5)
WBC # BLD: 8.3 K/UL (ref 3.5–11.3)

## 2022-07-09 PROCEDURE — 83550 IRON BINDING TEST: CPT

## 2022-07-09 PROCEDURE — 84443 ASSAY THYROID STIM HORMONE: CPT

## 2022-07-09 PROCEDURE — 80048 BASIC METABOLIC PNL TOTAL CA: CPT

## 2022-07-09 PROCEDURE — 83540 ASSAY OF IRON: CPT

## 2022-07-09 PROCEDURE — 36415 COLL VENOUS BLD VENIPUNCTURE: CPT

## 2022-07-09 PROCEDURE — 80061 LIPID PANEL: CPT

## 2022-07-09 PROCEDURE — 82607 VITAMIN B-12: CPT

## 2022-07-09 PROCEDURE — 85027 COMPLETE CBC AUTOMATED: CPT

## 2022-07-09 PROCEDURE — 82306 VITAMIN D 25 HYDROXY: CPT

## 2022-07-09 PROCEDURE — 82746 ASSAY OF FOLIC ACID SERUM: CPT

## 2022-07-10 LAB
FOLATE: 17.5 NG/ML
IRON SATURATION: 36 % (ref 20–55)
IRON: 148 UG/DL (ref 37–145)
TOTAL IRON BINDING CAPACITY: 408 UG/DL (ref 250–450)
UNSATURATED IRON BINDING CAPACITY: 260 UG/DL (ref 112–347)
VITAMIN B-12: 537 PG/ML (ref 232–1245)
VITAMIN D 25-HYDROXY: 24.4 NG/ML

## 2022-07-12 ENCOUNTER — TELEPHONE (OUTPATIENT)
Dept: PRIMARY CARE CLINIC | Age: 42
End: 2022-07-12

## 2022-07-12 DIAGNOSIS — D50.8 IRON DEFICIENCY ANEMIA SECONDARY TO INADEQUATE DIETARY IRON INTAKE: ICD-10-CM

## 2022-07-12 RX ORDER — FERROUS SULFATE 300 MG/5ML
300 LIQUID (ML) ORAL
Qty: 300 ML | Refills: 2 | COMMUNITY
Start: 2022-07-13

## 2022-07-12 NOTE — TELEPHONE ENCOUNTER
----- Message from CLOTILDE Be CNP sent at 7/12/2022  9:58 AM EDT -----  Labs show a mild Vitamin D deficiency. I recommend that she start taking a daily OTC multivitamin. Her Iron is slightly elevated. I want her to change her iron supplement to only taking on Monday, Wednesday, and Friday.

## 2022-07-12 NOTE — TELEPHONE ENCOUNTER
Patient notified of lab results. Patient verbalized understanding of Vitamin D and to take a multivitamin but she did inform me that she has not been taking her iron supplement. Patient was wondering what she should do now. Please advise.

## 2022-07-12 NOTE — TELEPHONE ENCOUNTER
It was barely elevated so that is fine if she has discontinued her iron. We will just continue to monitor.

## 2022-07-27 ENCOUNTER — OFFICE VISIT (OUTPATIENT)
Dept: PRIMARY CARE CLINIC | Age: 42
End: 2022-07-27
Payer: COMMERCIAL

## 2022-07-27 VITALS
BODY MASS INDEX: 29.91 KG/M2 | DIASTOLIC BLOOD PRESSURE: 78 MMHG | SYSTOLIC BLOOD PRESSURE: 128 MMHG | OXYGEN SATURATION: 98 % | TEMPERATURE: 97.7 F | HEART RATE: 78 BPM | WEIGHT: 185.3 LBS | RESPIRATION RATE: 20 BRPM

## 2022-07-27 DIAGNOSIS — E66.3 OVERWEIGHT: ICD-10-CM

## 2022-07-27 DIAGNOSIS — I10 PRIMARY HYPERTENSION: Primary | ICD-10-CM

## 2022-07-27 DIAGNOSIS — F34.1 DYSTHYMIA: ICD-10-CM

## 2022-07-27 PROCEDURE — 99214 OFFICE O/P EST MOD 30 MIN: CPT | Performed by: NURSE PRACTITIONER

## 2022-07-27 RX ORDER — BUPROPION HYDROCHLORIDE 150 MG/1
150 TABLET ORAL EVERY MORNING
Qty: 90 TABLET | Refills: 1 | Status: SHIPPED | OUTPATIENT
Start: 2022-07-27

## 2022-07-27 ASSESSMENT — ENCOUNTER SYMPTOMS
NAUSEA: 0
VISUAL CHANGE: 0
SORE THROAT: 0
COUGH: 0
WHEEZING: 0
RHINORRHEA: 0
SHORTNESS OF BREATH: 0
ORTHOPNEA: 0
DIARRHEA: 0
ABDOMINAL PAIN: 0
VOMITING: 0
CONSTIPATION: 0
BLURRED VISION: 0

## 2022-07-27 ASSESSMENT — PATIENT HEALTH QUESTIONNAIRE - PHQ9
SUM OF ALL RESPONSES TO PHQ QUESTIONS 1-9: 0
SUM OF ALL RESPONSES TO PHQ9 QUESTIONS 1 & 2: 0
SUM OF ALL RESPONSES TO PHQ QUESTIONS 1-9: 0
SUM OF ALL RESPONSES TO PHQ QUESTIONS 1-9: 0
2. FEELING DOWN, DEPRESSED OR HOPELESS: 0
SUM OF ALL RESPONSES TO PHQ QUESTIONS 1-9: 0
1. LITTLE INTEREST OR PLEASURE IN DOING THINGS: 0

## 2022-07-27 NOTE — PROGRESS NOTES
Name: April Call  : 1980         Chief Complaint:     Chief Complaint   Patient presents with    Hypertension     1 month BP check. Feels no different     Weight Loss     Wants to discuss Adipex. History of Present Illness:      April Call is a 46 y.o.  female who presents with Hypertension (1 month BP check. Feels no different ) and Weight Loss (Wants to discuss Adipex.)      Angelica Cox is here today for a routine office visit. Hypertension-responding well to lisinopril. Blood pressure normalized. See below for further comment. Dysthymia/obesity-patient is frustrated because she cannot lose weight. She states she has been trying to watch her food intake and exercise but is unsuccessful. This is causing her much stress and agitation. See below for further comment. Hypertension  This is a recurrent problem. The current episode started more than 1 month ago. The problem has been gradually improving since onset. The problem is controlled. Pertinent negatives include no anxiety, blurred vision, chest pain, headaches, malaise/fatigue, neck pain, orthopnea, palpitations, peripheral edema, PND, shortness of breath or sweats. There are no associated agents to hypertension. Risk factors for coronary artery disease include stress, obesity and family history. Past treatments include ACE inhibitors. The current treatment provides moderate improvement. Compliance problems include exercise and diet. There is no history of kidney disease, CAD/MI, CVA or heart failure. There is no history of chronic renal disease. Mental Health Problem  The primary symptoms include dysphoric mood and negative symptoms. The primary symptoms do not include delusions, hallucinations, bizarre behavior, disorganized speech or somatic symptoms. The current episode started more than 1 month ago. This is a chronic problem. The dysphoric mood began more than 2 weeks ago.  The mood has been worsening since its onset. She characterizes the problem as moderate. The mood includes feelings of sadness, irritability and despair. Her change in mood was precipitated by a stressful event. The negative symptoms began more than 1 month ago. The negative symptoms appear to have been worsening since their onset. The negative symptoms include anhedonia. The onset of the illness is precipitated by emotional stress. The degree of incapacity that she is experiencing as a consequence of her illness is moderate. Sequelae of the illness include harmed interpersonal relations. Additional symptoms of the illness include anhedonia, unexpected weight change, fatigue, agitation and distractible. Additional symptoms of the illness do not include insomnia, hypersomnia, appetite change, psychomotor retardation, feelings of worthlessness, attention impairment, euphoric mood, increased goal-directed activity, flight of ideas, inflated self-esteem, decreased need for sleep, poor judgment, visual change, headaches, abdominal pain or seizures. She does not admit to suicidal ideas. She does not have a plan to attempt suicide. She does not contemplate harming herself. She has not already injured self. She does not contemplate injuring another person. She has not already  injured another person. Past Medical History:     Past Medical History:   Diagnosis Date    Esophageal stenosis     Thyroid disease       Reviewed all health maintenance requirements and ordered appropriate tests  There are no preventive care reminders to display for this patient. Past Surgical History:     Past Surgical History:   Procedure Laterality Date    DILATION AND CURETTAGE OF UTERUS N/A 6/25/2021    DILATATION AND CURETTAGE HYSTEROSCOPY CAUTERY ABLATION, NOVASURE performed by Lora Pate DO at [address]      stretched x 8    SALPINGECTOMY Bilateral 06/25/2021    WITH DR. FAULKNER    SALPINGECTOMY Bilateral 6/25/2021 SALPINGECTOMY LAPAROSCOPIC performed by Sissy Felix DO at [address]          Medications:       Prior to Admission medications    Medication Sig Start Date End Date Taking? Authorizing Provider   buPROPion (WELLBUTRIN XL) 150 MG extended release tablet Take 1 tablet by mouth every morning 7/27/22  Yes CLOTILDE Wu CNP   lisinopril (PRINIVIL;ZESTRIL) 10 MG tablet Take 1 tablet by mouth daily 6/23/22  Yes CLOTILDE Morales CNP   levothyroxine (SYNTHROID) 50 MCG tablet Take 1 tablet by mouth daily 6/23/22  Yes CLOTILDE Morales CNP   Multiple Vitamins-Minerals (THERAPEUTIC MULTIVITAMIN-MINERALS) tablet Take 1 tablet by mouth daily   Yes Historical Provider, MD   ferrous sulfate 300 (60 Fe) MG/5ML syrup Take 5 mLs by mouth three times a week Take on Monday, Wednesday, and Friday  Patient not taking: Reported on 7/27/2022 7/13/22   CLOTILDE Morales CNP   ondansetron (ZOFRAN) 4 MG tablet Take 1 tablet by mouth 3 times daily as needed for Nausea or Vomiting  Patient not taking: No sig reported 10/13/21   CLOTILDE Morales CNP        Allergies:       Latex and Ceclor [cefaclor]    Social History:     Tobacco:    reports that she quit smoking about 7 years ago. Her smoking use included cigarettes. She has never used smokeless tobacco.  Alcohol:      reports current alcohol use. Drug Use:  reports no history of drug use. Family History:     Family History   Problem Relation Age of Onset    No Known Problems Mother     Cancer Father     Hypertension Father     Heart Disease Father     Hypertension Sister     Other Other         No family history of breast or ovarian cancer. Pat. uncle DVT. Review of Systems:     Positive and Negative as described in HPI    Review of Systems   Constitutional:  Positive for fatigue and unexpected weight change. Negative for appetite change, chills, fever and malaise/fatigue.    HENT:  Negative for congestion, rhinorrhea and sore throat. Eyes:  Negative for blurred vision and visual disturbance. Respiratory:  Negative for cough, shortness of breath and wheezing. Cardiovascular:  Negative for chest pain, palpitations, orthopnea and PND. Gastrointestinal:  Negative for abdominal pain, constipation, diarrhea, nausea and vomiting. Genitourinary:  Negative for difficulty urinating and dysuria. Musculoskeletal:  Negative for gait problem, neck pain and neck stiffness. Skin:  Negative for rash. Neurological:  Negative for dizziness, seizures, syncope, light-headedness and headaches. Psychiatric/Behavioral:  Positive for agitation and dysphoric mood. Negative for behavioral problems, confusion, decreased concentration, hallucinations, self-injury, sleep disturbance and suicidal ideas. The patient is not nervous/anxious, does not have insomnia and is not hyperactive. Physical Exam:   Vitals:  /78 (Position: Sitting)   Pulse 78   Temp 97.7 °F (36.5 °C) (Temporal)   Resp 20   Wt 185 lb 4.8 oz (84.1 kg)   SpO2 98%   BMI 29.91 kg/m²     Physical Exam  Nursing note reviewed. Constitutional:       General: She is not in acute distress. Appearance: Normal appearance. She is not ill-appearing. HENT:      Mouth/Throat:      Mouth: Mucous membranes are moist.      Pharynx: Oropharynx is clear. Eyes:      General: No scleral icterus. Conjunctiva/sclera: Conjunctivae normal.   Cardiovascular:      Rate and Rhythm: Normal rate and regular rhythm. Heart sounds: No murmur heard. Pulmonary:      Effort: Pulmonary effort is normal.      Breath sounds: Normal breath sounds. No wheezing. Abdominal:      General: Bowel sounds are normal. There is no distension. Palpations: Abdomen is soft. Tenderness: There is no abdominal tenderness. Musculoskeletal:      Cervical back: Normal range of motion and neck supple. Right lower leg: No edema.       Left lower leg: No edema.   Lymphadenopathy:      Cervical: No cervical adenopathy. Skin:     General: Skin is warm and dry. Findings: No rash. Neurological:      Mental Status: She is alert and oriented to person, place, and time. Psychiatric:         Attention and Perception: Attention normal.         Mood and Affect: Mood is depressed (mild). Mood is not anxious. Speech: Speech normal.         Behavior: Behavior normal. Behavior is cooperative. Thought Content: Thought content normal. Thought content does not include homicidal or suicidal ideation. Thought content does not include homicidal or suicidal plan. Cognition and Memory: Cognition normal.         Judgment: Judgment normal.       Data:     Lab Results   Component Value Date/Time     07/09/2022 08:16 AM    K 4.3 07/09/2022 08:16 AM     07/09/2022 08:16 AM    CO2 22 07/09/2022 08:16 AM    BUN 13 07/09/2022 08:16 AM    CREATININE 0.56 07/09/2022 08:16 AM    GLUCOSE 94 07/09/2022 08:16 AM    PROT 7.3 05/30/2022 10:10 AM    LABALBU 4.4 05/30/2022 10:10 AM    BILITOT 0.64 05/30/2022 10:10 AM    ALKPHOS 113 05/30/2022 10:10 AM    AST 19 05/30/2022 10:10 AM    ALT 21 05/30/2022 10:10 AM     Lab Results   Component Value Date/Time    WBC 8.3 07/09/2022 08:16 AM    RBC 4.57 07/09/2022 08:16 AM    HGB 13.9 07/09/2022 08:16 AM    HCT 41.8 07/09/2022 08:16 AM    MCV 91.5 07/09/2022 08:16 AM    MCH 30.4 07/09/2022 08:16 AM    MCHC 33.3 07/09/2022 08:16 AM    RDW 13.2 07/09/2022 08:16 AM     07/09/2022 08:16 AM    MPV 8.7 07/09/2022 08:16 AM     Lab Results   Component Value Date/Time    TSH 1.79 07/09/2022 08:16 AM     Lab Results   Component Value Date/Time    CHOL 210 07/09/2022 08:16 AM    HDL 55 07/09/2022 08:16 AM       Assessment/Plan:      Diagnosis Orders   1. Primary hypertension        2. Dysthymia  buPROPion (WELLBUTRIN XL) 150 MG extended release tablet      3.  Overweight  buPROPion (WELLBUTRIN XL) 150 MG extended release tablet        Continue lisinopril. Blood pressure well controlled. Start bupropion 150 mg XL daily. Hopefully this will help with mood and cravings. We will see her back in 3 months for evaluation, sooner if any issues. 1.  Zachary Valdivia received counseling on the following healthy behaviors: nutrition, exercise, and medication adherence  2. Patient given educational materials - see patient instructions  3. Was a self-tracking handout given in paper form or via [employer]? No  If yes, see orders or list here. 4.  Discussed use, benefit, and side effects of prescribed medications. Barriers to medication compliance addressed. All patient questions answered. Pt voiced understanding. 5.  Reviewed prior labs and health maintenance  6. Continue current medications, diet and exercise. Completed Refills   Requested Prescriptions     Signed Prescriptions Disp Refills    buPROPion (WELLBUTRIN XL) 150 MG extended release tablet 90 tablet 1     Sig: Take 1 tablet by mouth every morning         Return in about 3 months (around 10/27/2022) for Check up.

## 2022-07-28 ENCOUNTER — TELEPHONE (OUTPATIENT)
Dept: PRIMARY CARE CLINIC | Age: 42
End: 2022-07-28

## 2022-07-28 NOTE — TELEPHONE ENCOUNTER
Patient called into office regarding issue with new medication bupropion (Wellbutrin) that was ordered at yesterday's visit. Patient states she forgot to mention that she is unable to swallow pills and would like to know if she can have an alternative (liquid) form sent to pharmacy or if there are any other options.

## 2022-07-28 NOTE — TELEPHONE ENCOUNTER
Call placed to patient to advise. Patient to call pharmacy and will call back with details on whether or not medication can be ordered in liquid form.

## 2022-08-12 ENCOUNTER — TELEPHONE (OUTPATIENT)
Dept: PRIMARY CARE CLINIC | Age: 42
End: 2022-08-12

## 2022-08-12 NOTE — TELEPHONE ENCOUNTER
Patient called, tested positive for COVID, c/o sore throat,ear pain,body aches. Requesting Paxlovid.

## 2022-09-26 ENCOUNTER — OFFICE VISIT (OUTPATIENT)
Dept: PRIMARY CARE CLINIC | Age: 42
End: 2022-09-26
Payer: COMMERCIAL

## 2022-09-26 VITALS
HEART RATE: 79 BPM | RESPIRATION RATE: 18 BRPM | TEMPERATURE: 98 F | WEIGHT: 184 LBS | BODY MASS INDEX: 29.7 KG/M2 | DIASTOLIC BLOOD PRESSURE: 82 MMHG | OXYGEN SATURATION: 99 % | SYSTOLIC BLOOD PRESSURE: 118 MMHG

## 2022-09-26 DIAGNOSIS — J03.80 ACUTE BACTERIAL TONSILLITIS: Primary | ICD-10-CM

## 2022-09-26 DIAGNOSIS — B96.89 ACUTE BACTERIAL TONSILLITIS: Primary | ICD-10-CM

## 2022-09-26 LAB — S PYO AG THROAT QL: NORMAL

## 2022-09-26 PROCEDURE — G8417 CALC BMI ABV UP PARAM F/U: HCPCS | Performed by: NURSE PRACTITIONER

## 2022-09-26 PROCEDURE — 1036F TOBACCO NON-USER: CPT | Performed by: NURSE PRACTITIONER

## 2022-09-26 PROCEDURE — 99214 OFFICE O/P EST MOD 30 MIN: CPT | Performed by: NURSE PRACTITIONER

## 2022-09-26 PROCEDURE — 87880 STREP A ASSAY W/OPTIC: CPT | Performed by: NURSE PRACTITIONER

## 2022-09-26 PROCEDURE — G8427 DOCREV CUR MEDS BY ELIG CLIN: HCPCS | Performed by: NURSE PRACTITIONER

## 2022-09-26 RX ORDER — LIDOCAINE HYDROCHLORIDE 20 MG/ML
15 SOLUTION OROPHARYNGEAL
Qty: 100 ML | Refills: 0 | Status: SHIPPED | OUTPATIENT
Start: 2022-09-26 | End: 2022-09-30

## 2022-09-26 RX ORDER — CLINDAMYCIN HYDROCHLORIDE 300 MG/1
300 CAPSULE ORAL 3 TIMES DAILY
Qty: 30 CAPSULE | Refills: 0 | Status: SHIPPED | OUTPATIENT
Start: 2022-09-26 | End: 2022-10-06

## 2022-09-26 ASSESSMENT — PATIENT HEALTH QUESTIONNAIRE - PHQ9
8. MOVING OR SPEAKING SO SLOWLY THAT OTHER PEOPLE COULD HAVE NOTICED. OR THE OPPOSITE, BEING SO FIGETY OR RESTLESS THAT YOU HAVE BEEN MOVING AROUND A LOT MORE THAN USUAL: 0
7. TROUBLE CONCENTRATING ON THINGS, SUCH AS READING THE NEWSPAPER OR WATCHING TELEVISION: 0
4. FEELING TIRED OR HAVING LITTLE ENERGY: 0
6. FEELING BAD ABOUT YOURSELF - OR THAT YOU ARE A FAILURE OR HAVE LET YOURSELF OR YOUR FAMILY DOWN: 0
SUM OF ALL RESPONSES TO PHQ QUESTIONS 1-9: 0
SUM OF ALL RESPONSES TO PHQ QUESTIONS 1-9: 0
2. FEELING DOWN, DEPRESSED OR HOPELESS: 0
9. THOUGHTS THAT YOU WOULD BE BETTER OFF DEAD, OR OF HURTING YOURSELF: 0
SUM OF ALL RESPONSES TO PHQ QUESTIONS 1-9: 0
SUM OF ALL RESPONSES TO PHQ QUESTIONS 1-9: 0
1. LITTLE INTEREST OR PLEASURE IN DOING THINGS: 0
SUM OF ALL RESPONSES TO PHQ9 QUESTIONS 1 & 2: 0
5. POOR APPETITE OR OVEREATING: 0
10. IF YOU CHECKED OFF ANY PROBLEMS, HOW DIFFICULT HAVE THESE PROBLEMS MADE IT FOR YOU TO DO YOUR WORK, TAKE CARE OF THINGS AT HOME, OR GET ALONG WITH OTHER PEOPLE: 0
3. TROUBLE FALLING OR STAYING ASLEEP: 0

## 2022-09-26 ASSESSMENT — ENCOUNTER SYMPTOMS
RHINORRHEA: 0
GASTROINTESTINAL NEGATIVE: 1
COUGH: 1
SORE THROAT: 1
EYES NEGATIVE: 1

## 2022-09-26 NOTE — PATIENT INSTRUCTIONS
SURVEY:     You may be receiving a survey from [employer] regarding your visit today. Please complete the survey to enable us to provide the highest quality of care to you and your family. If you cannot score us a very good on any question, please call the office to discuss how we could have made your experience a very good one.      Thank you,    Tyler Hernadez, APRN-CNP  Shyanne Sena, APRN-CNP  Alicia Mccarthy, KELVINN  Giovanni Joaquin, ESSENCE Louise, ESSENCE Coley, CMA  Claire, PCA  Patsy, PM

## 2022-09-26 NOTE — PROGRESS NOTES
[facility]  [place], [address] [facility]  [address] [facility] [address] [facility]  Dept: [phone]  Dept Fax: [phone]      Name: Manish Sena  : 1980         Chief Complaint:     Chief Complaint   Patient presents with    Pharyngitis     C/o sore throat on right side x 1 week. Otalgia     C/o bilateral ear pain x 1 week. History of Present Illness:      Manish Sena is a 38 y.o.  female who presents with Pharyngitis (C/o sore throat on right side x 1 week. ) and Otalgia (C/o bilateral ear pain x 1 week. )    Arcelia Parsons is here for a sick visit. She states she has had a sore throat and ear pain for a week. Increased pain on right side. She states she went to ER in May and had tonsillitis and feels the same. She has not taken her temperature but has had chills. She had some nasal congestion. She has had a chronic cough for over a month after having Covid. She has taken Tylenol OTC cold and flu, Ibuprofen, and Equate brand cold medication. She states that she has used cough drops and been drinking hot tea with no relief. She states by the end of the day it hurts to talk. She rates her throat pain a 9 on 0-10 scale. Past Medical History:     Past Medical History:   Diagnosis Date    Esophageal stenosis     Thyroid disease       Reviewed all health maintenance requirements and ordered appropriate tests  There are no preventive care reminders to display for this patient. Past Surgical History:     Past Surgical History:   Procedure Laterality Date    DILATION AND CURETTAGE OF UTERUS N/A 2021    DILATATION AND CURETTAGE HYSTEROSCOPY CAUTERY ABLATION, NOVASURE performed by Ang Morning, DO at [address]      stretched x 8    SALPINGECTOMY Bilateral 2021    WITH DR. FAULKNER    SALPINGECTOMY Bilateral 2021    SALPINGECTOMY LAPAROSCOPIC performed by Ang Morning, DO at [address] UPPER GASTROINTESTINAL ENDOSCOPY          Medications:       Prior to Admission medications    Medication Sig Start Date End Date Taking? Authorizing Provider   lidocaine viscous hcl (XYLOCAINE) 2 % SOLN solution Take 15 mLs by mouth every 3 hours as needed for Irritation or Pain 9/26/22 9/30/22 Yes CLOTILDE Tierney CNP   clindamycin (CLEOCIN) 300 MG capsule Take 1 capsule by mouth 3 times daily for 10 days 9/26/22 10/6/22 Yes CLOTILDE Tierney CNP   lisinopril (PRINIVIL;ZESTRIL) 10 MG tablet Take 1 tablet by mouth daily 6/23/22  Yes CLOTILDE Tierney CNP   levothyroxine (SYNTHROID) 50 MCG tablet Take 1 tablet by mouth daily 6/23/22  Yes CLOTILDE Tierney CNP   Multiple Vitamins-Minerals (THERAPEUTIC MULTIVITAMIN-MINERALS) tablet Take 1 tablet by mouth daily   Yes Historical Provider, MD   buPROPion (WELLBUTRIN XL) 150 MG extended release tablet Take 1 tablet by mouth every morning  Patient not taking: Reported on 9/26/2022 7/27/22   CLOTILDE Collier CNP   ferrous sulfate 300 (60 Fe) MG/5ML syrup Take 5 mLs by mouth three times a week Take on Monday, Wednesday, and Friday  Patient not taking: No sig reported 7/13/22   CLOTILDE Tierney CNP   ondansetron (ZOFRAN) 4 MG tablet Take 1 tablet by mouth 3 times daily as needed for Nausea or Vomiting  Patient not taking: No sig reported 10/13/21   CLOTILDE Tierney CNP        Allergies:       Latex and Ceclor [cefaclor]    Social History:     Tobacco:    reports that she quit smoking about 7 years ago. Her smoking use included cigarettes. She has never used smokeless tobacco.  Alcohol:      reports current alcohol use. Drug Use:  reports no history of drug use. Family History:     Family History   Problem Relation Age of Onset    No Known Problems Mother     Cancer Father     Hypertension Father     Heart Disease Father     Hypertension Sister     Other Other         No family history of breast or ovarian cancer. Pat. uncle DVT. Review of Systems:     Positive and Negative as described in HPI    Review of Systems   Constitutional: Negative. Negative for appetite change, fatigue and fever. HENT:  Positive for congestion, ear pain and sore throat. Negative for rhinorrhea. Eyes: Negative. Respiratory:  Positive for cough. Cardiovascular: Negative. Gastrointestinal: Negative. Endocrine: Negative. Genitourinary: Negative. Musculoskeletal: Negative. Skin: Negative. Allergic/Immunologic: Positive for environmental allergies. Neurological: Negative. Hematological: Negative. Psychiatric/Behavioral: Negative. Physical Exam:   Vitals:  /82   Pulse 79   Temp 98 °F (36.7 °C) (Temporal)   Resp 18   Wt 184 lb (83.5 kg)   SpO2 99%   BMI 29.70 kg/m²     Physical Exam  Vitals and nursing note reviewed. Constitutional:       Appearance: Normal appearance. HENT:      Head: Normocephalic. Right Ear: Tympanic membrane normal.      Left Ear: Tympanic membrane normal.      Nose: Rhinorrhea present. No mucosal edema. Rhinorrhea is clear. Mouth/Throat:      Lips: Pink. Mouth: Mucous membranes are moist.      Pharynx: Posterior oropharyngeal erythema present. Tonsils: No tonsillar exudate. 2+ on the right. 2+ on the left. Eyes:      Conjunctiva/sclera: Conjunctivae normal.      Pupils: Pupils are equal, round, and reactive to light. Cardiovascular:      Rate and Rhythm: Normal rate and regular rhythm. Heart sounds: Normal heart sounds. Pulmonary:      Effort: Pulmonary effort is normal.      Breath sounds: Normal breath sounds. Musculoskeletal:         General: Normal range of motion. Cervical back: Normal range of motion. Lymphadenopathy:      Cervical: Cervical adenopathy (anterior) present. Skin:     General: Skin is warm. Capillary Refill: Capillary refill takes less than 2 seconds. Neurological:      General: No focal deficit present. Mental Status: She is alert and oriented to person, place, and time. Psychiatric:         Mood and Affect: Mood normal.         Behavior: Behavior normal.       Data:     Lab Results   Component Value Date/Time     07/09/2022 08:16 AM    K 4.3 07/09/2022 08:16 AM     07/09/2022 08:16 AM    CO2 22 07/09/2022 08:16 AM    BUN 13 07/09/2022 08:16 AM    CREATININE 0.56 07/09/2022 08:16 AM    GLUCOSE 94 07/09/2022 08:16 AM    PROT 7.3 05/30/2022 10:10 AM    LABALBU 4.4 05/30/2022 10:10 AM    BILITOT 0.64 05/30/2022 10:10 AM    ALKPHOS 113 05/30/2022 10:10 AM    AST 19 05/30/2022 10:10 AM    ALT 21 05/30/2022 10:10 AM     Lab Results   Component Value Date/Time    WBC 8.3 07/09/2022 08:16 AM    RBC 4.57 07/09/2022 08:16 AM    HGB 13.9 07/09/2022 08:16 AM    HCT 41.8 07/09/2022 08:16 AM    MCV 91.5 07/09/2022 08:16 AM    MCH 30.4 07/09/2022 08:16 AM    MCHC 33.3 07/09/2022 08:16 AM    RDW 13.2 07/09/2022 08:16 AM     07/09/2022 08:16 AM    MPV 8.7 07/09/2022 08:16 AM     Lab Results   Component Value Date/Time    TSH 1.79 07/09/2022 08:16 AM     Lab Results   Component Value Date/Time    CHOL 210 07/09/2022 08:16 AM    HDL 55 07/09/2022 08:16 AM       Assessment/Plan:      Diagnosis Orders   1. Acute bacterial tonsillitis  POCT rapid strep A          Take all of medication as prescribed until all doses are completed. Tylenol/Ibuprofen OTC PRN as directed on package for pain, discomfort or fever  Encouraged to increase fluids and rest  Warm salt water gargles for sore throat  Hot tea with honey to soothe sore throat. Cepacol lozenges, chloraseptic spray OTC q 1-2 hrs PRN for sore throat  Take all medication as prescribed. 1.  Leigh Ann Mckeon received counseling on the following healthy behaviors:   2. Patient given educational materials - see patient instructions  3. Was a self-tracking handout given in paper form or via [employer]? If yes, see orders or list here.   4.  Discussed use, benefit, and side effects of prescribed medications. Barriers to medication compliance addressed. All patient questions answered. Pt voiced understanding. 5.  Reviewed prior labs and health maintenance  6. Continue current medications, diet and exercise. Completed Refills   Requested Prescriptions     Signed Prescriptions Disp Refills    lidocaine viscous hcl (XYLOCAINE) 2 % SOLN solution 100 mL 0     Sig: Take 15 mLs by mouth every 3 hours as needed for Irritation or Pain    clindamycin (CLEOCIN) 300 MG capsule 30 capsule 0     Sig: Take 1 capsule by mouth 3 times daily for 10 days         No follow-ups on file.

## 2022-12-05 ENCOUNTER — OFFICE VISIT (OUTPATIENT)
Dept: PRIMARY CARE CLINIC | Age: 42
End: 2022-12-05
Payer: COMMERCIAL

## 2022-12-05 VITALS
TEMPERATURE: 97.7 F | DIASTOLIC BLOOD PRESSURE: 92 MMHG | BODY MASS INDEX: 29.67 KG/M2 | WEIGHT: 184.6 LBS | HEART RATE: 96 BPM | RESPIRATION RATE: 18 BRPM | OXYGEN SATURATION: 100 % | SYSTOLIC BLOOD PRESSURE: 122 MMHG | HEIGHT: 66 IN

## 2022-12-05 DIAGNOSIS — E03.8 SUBCLINICAL HYPOTHYROIDISM: ICD-10-CM

## 2022-12-05 DIAGNOSIS — F41.9 ANXIETY: ICD-10-CM

## 2022-12-05 DIAGNOSIS — I10 PRIMARY HYPERTENSION: Primary | ICD-10-CM

## 2022-12-05 PROCEDURE — 3078F DIAST BP <80 MM HG: CPT | Performed by: NURSE PRACTITIONER

## 2022-12-05 PROCEDURE — 99214 OFFICE O/P EST MOD 30 MIN: CPT | Performed by: NURSE PRACTITIONER

## 2022-12-05 PROCEDURE — G8417 CALC BMI ABV UP PARAM F/U: HCPCS | Performed by: NURSE PRACTITIONER

## 2022-12-05 PROCEDURE — 3074F SYST BP LT 130 MM HG: CPT | Performed by: NURSE PRACTITIONER

## 2022-12-05 PROCEDURE — 1036F TOBACCO NON-USER: CPT | Performed by: NURSE PRACTITIONER

## 2022-12-05 PROCEDURE — G8427 DOCREV CUR MEDS BY ELIG CLIN: HCPCS | Performed by: NURSE PRACTITIONER

## 2022-12-05 PROCEDURE — G8484 FLU IMMUNIZE NO ADMIN: HCPCS | Performed by: NURSE PRACTITIONER

## 2022-12-05 RX ORDER — HYDROXYZINE HYDROCHLORIDE 25 MG/1
25 TABLET, FILM COATED ORAL EVERY 8 HOURS PRN
Qty: 90 TABLET | Refills: 2 | Status: SHIPPED | OUTPATIENT
Start: 2022-12-05

## 2022-12-05 RX ORDER — LISINOPRIL 20 MG/1
20 TABLET ORAL DAILY
Qty: 90 TABLET | Refills: 1 | Status: SHIPPED | OUTPATIENT
Start: 2022-12-05

## 2022-12-05 ASSESSMENT — ENCOUNTER SYMPTOMS
RHINORRHEA: 0
NAUSEA: 0
VOMITING: 0
SHORTNESS OF BREATH: 0
WHEEZING: 0
BLURRED VISION: 0
DIARRHEA: 0
ABDOMINAL PAIN: 0
SORE THROAT: 0
COUGH: 0
HYPERVENTILATION: 0
ORTHOPNEA: 0
CONSTIPATION: 0
FEELING OF CHOKING: 0

## 2022-12-05 ASSESSMENT — PATIENT HEALTH QUESTIONNAIRE - PHQ9
SUM OF ALL RESPONSES TO PHQ QUESTIONS 1-9: 0
5. POOR APPETITE OR OVEREATING: 0
3. TROUBLE FALLING OR STAYING ASLEEP: 0
SUM OF ALL RESPONSES TO PHQ QUESTIONS 1-9: 0
8. MOVING OR SPEAKING SO SLOWLY THAT OTHER PEOPLE COULD HAVE NOTICED. OR THE OPPOSITE, BEING SO FIGETY OR RESTLESS THAT YOU HAVE BEEN MOVING AROUND A LOT MORE THAN USUAL: 0
SUM OF ALL RESPONSES TO PHQ9 QUESTIONS 1 & 2: 0
7. TROUBLE CONCENTRATING ON THINGS, SUCH AS READING THE NEWSPAPER OR WATCHING TELEVISION: 0
9. THOUGHTS THAT YOU WOULD BE BETTER OFF DEAD, OR OF HURTING YOURSELF: 0
2. FEELING DOWN, DEPRESSED OR HOPELESS: 0
4. FEELING TIRED OR HAVING LITTLE ENERGY: 0
SUM OF ALL RESPONSES TO PHQ QUESTIONS 1-9: 0
10. IF YOU CHECKED OFF ANY PROBLEMS, HOW DIFFICULT HAVE THESE PROBLEMS MADE IT FOR YOU TO DO YOUR WORK, TAKE CARE OF THINGS AT HOME, OR GET ALONG WITH OTHER PEOPLE: 0
SUM OF ALL RESPONSES TO PHQ QUESTIONS 1-9: 0
6. FEELING BAD ABOUT YOURSELF - OR THAT YOU ARE A FAILURE OR HAVE LET YOURSELF OR YOUR FAMILY DOWN: 0
1. LITTLE INTEREST OR PLEASURE IN DOING THINGS: 0

## 2022-12-05 NOTE — PATIENT INSTRUCTIONS
SURVEY:     You may be receiving a survey from [employer] regarding your visit today. Please complete the survey to enable us to provide the highest quality of care to you and your family. If you cannot score us a very good on any question, please call the office to discuss how we could have made your experience a very good one.      Thank you,    Cristino Hernadez, APRN-CNP  Maggy Armenta, APRN-CNP  Lior Senior, JANAY Noriega, ESSENCE Curry, ESSENCE Coley, CMA  Claire, PCA  Patsy, PM

## 2022-12-05 NOTE — PROGRESS NOTES
Name: Michelle Deutsch  : 1980         Chief Complaint:     Chief Complaint   Patient presents with    Hypertension    Hypothyroidism    Anxiety     Not taking Wellbutrin       History of Present Illness:      Michelle Deutsch is a 38 y.o.  female who presents with Hypertension, Hypothyroidism, and Anxiety (Not taking Wellbutrin)      Manjit Guzman is here today for a routine office visit. Hypertension-not quite at goal.  Patient is compliant with lisinopril 10 mg but is still getting readings in the 75T with her diastolic pressure. See below for further comment. Subclinical hypothyroidism-stable, patient states she does get some hot flashes when taking her medications. She is not taking them separately as instructed. I did ask her to take her levothyroxine in the morning with water only and then wait at least 30 minutes before taking any other medications. She will try this. She denies any excessive fatigue or daytime sleepiness. She does complain of some anxiety but this is more related to her current situation than the medication. Anxiety-worsening, patient does worry a lot. She states she tried Wellbutrin but was unsuccessful. She states she could not take the medication due to not being able to swallow the tablet and she really does not want to take anything on a long-term basis. She is asking for medication to use if she has acute anxiety. See below for further comment. Hypertension  This is a chronic problem. The current episode started more than 1 month ago. The problem has been gradually improving since onset. The problem is uncontrolled. Associated symptoms include anxiety. Pertinent negatives include no blurred vision, chest pain, headaches, malaise/fatigue, neck pain, orthopnea, palpitations, peripheral edema, PND, shortness of breath or sweats. There are no associated agents to hypertension. Risk factors for coronary artery disease include family history and stress.  Past treatments include ACE inhibitors. The current treatment provides mild improvement. Compliance problems include exercise. There is no history of kidney disease, CAD/MI, CVA or heart failure. There is no history of chronic renal disease. Anxiety  Presents for follow-up visit. Symptoms include excessive worry and nervous/anxious behavior. Patient reports no chest pain, compulsions, confusion, decreased concentration, depressed mood, dizziness, dry mouth, feeling of choking, hyperventilation, insomnia, irritability, malaise, muscle tension, nausea, obsessions, palpitations, panic, restlessness, shortness of breath or suicidal ideas. Symptoms occur occasionally. Duration: HOURS. The severity of symptoms is moderate. The patient sleeps 7 hours per night. The quality of sleep is good. Nighttime awakenings: occasional.     Compliance with medications is 51-75%. Past Medical History:     Past Medical History:   Diagnosis Date    Esophageal stenosis     Thyroid disease       Reviewed all health maintenance requirements and ordered appropriate tests  There are no preventive care reminders to display for this patient. Past Surgical History:     Past Surgical History:   Procedure Laterality Date    DILATION AND CURETTAGE OF UTERUS N/A 6/25/2021    DILATATION AND CURETTAGE HYSTEROSCOPY CAUTERY ABLATION, NOVASURE performed by Jacinda Langley DO at [address] [facility]      stretched x 8    SALPINGECTOMY Bilateral 06/25/2021    WITH DR. FAULKNER    SALPINGECTOMY Bilateral 6/25/2021    SALPINGECTOMY LAPAROSCOPIC performed by Jacinda Langley DO at [facility]. [facility]          Medications:       Prior to Admission medications    Medication Sig Start Date End Date Taking?  Authorizing Provider   lisinopril (PRINIVIL;ZESTRIL) 20 MG tablet Take 1 tablet by mouth daily 12/5/22  Yes Lynnie Lesch Might, APRN - CNP   hydrOXYzine HCl (ATARAX) 25 MG tablet Take 1 tablet by mouth every 8 hours as needed for Anxiety 12/5/22  Yes CLOTILDE Vanessa CNP   levothyroxine (SYNTHROID) 50 MCG tablet Take 1 tablet by mouth daily 6/23/22  Yes CLOTILDE Pabon CNP   Multiple Vitamins-Minerals (THERAPEUTIC MULTIVITAMIN-MINERALS) tablet Take 1 tablet by mouth daily   Yes Historical Provider, MD        Allergies:       Latex and Ceclor [cefaclor]    Social History:     Tobacco:    reports that she quit smoking about 7 years ago. Her smoking use included cigarettes. She has never used smokeless tobacco.  Alcohol:      reports current alcohol use. Drug Use:  reports no history of drug use. Family History:     Family History   Problem Relation Age of Onset    No Known Problems Mother     Cancer Father     Hypertension Father     Heart Disease Father     Hypertension Sister     Other Other         No family history of breast or ovarian cancer. Pat. uncle DVT. Review of Systems:     Positive and Negative as described in HPI    Review of Systems   Constitutional:  Negative for chills, fatigue, fever, irritability and malaise/fatigue. HENT:  Negative for congestion, rhinorrhea and sore throat. Eyes:  Negative for blurred vision and visual disturbance. Respiratory:  Negative for cough, shortness of breath and wheezing. Cardiovascular:  Negative for chest pain, palpitations, orthopnea and PND. Gastrointestinal:  Negative for abdominal pain, constipation, diarrhea, nausea and vomiting. Genitourinary:  Negative for difficulty urinating and dysuria. Musculoskeletal:  Negative for gait problem, neck pain and neck stiffness. Skin:  Negative for rash. Neurological:  Negative for dizziness, syncope, light-headedness and headaches. Psychiatric/Behavioral:  Positive for sleep disturbance. Negative for agitation, behavioral problems, confusion, decreased concentration, dysphoric mood, hallucinations, self-injury and suicidal ideas. The patient is nervous/anxious.  The patient does not have insomnia and is not hyperactive. Physical Exam:   Vitals:  BP (!) 122/92 (Site: Right Upper Arm, Position: Sitting)   Pulse 96   Temp 97.7 °F (36.5 °C) (Temporal)   Resp 18   Ht 5' 6\" (1.676 m)   Wt 184 lb 9.6 oz (83.7 kg)   SpO2 100%   BMI 29.80 kg/m²     Physical Exam  Vitals and nursing note reviewed. Constitutional:       General: She is not in acute distress. Appearance: Normal appearance. She is not ill-appearing. HENT:      Mouth/Throat:      Mouth: Mucous membranes are moist.      Pharynx: Oropharynx is clear. Eyes:      General: No scleral icterus. Conjunctiva/sclera: Conjunctivae normal.   Cardiovascular:      Rate and Rhythm: Normal rate and regular rhythm. Heart sounds: No murmur heard. Pulmonary:      Effort: Pulmonary effort is normal.      Breath sounds: Normal breath sounds. No wheezing. Abdominal:      General: Bowel sounds are normal. There is no distension. Palpations: Abdomen is soft. Tenderness: There is no abdominal tenderness. Musculoskeletal:      Cervical back: Normal range of motion and neck supple. Skin:     General: Skin is warm and dry. Findings: No rash. Neurological:      Mental Status: She is alert and oriented to person, place, and time. Psychiatric:         Attention and Perception: Attention normal.         Mood and Affect: Affect normal. Mood is anxious. Mood is not depressed. Speech: Speech normal.         Behavior: Behavior normal. Behavior is cooperative. Thought Content: Thought content normal. Thought content does not include homicidal or suicidal ideation. Thought content does not include homicidal or suicidal plan.          Cognition and Memory: Cognition normal.         Judgment: Judgment normal.       Data:     Lab Results   Component Value Date/Time     07/09/2022 08:16 AM    K 4.3 07/09/2022 08:16 AM     07/09/2022 08:16 AM    CO2 22 07/09/2022 08:16 AM    BUN 13 07/09/2022 08:16 AM    CREATININE 0.56 07/09/2022 08:16 AM    GLUCOSE 94 07/09/2022 08:16 AM    PROT 7.3 05/30/2022 10:10 AM    LABALBU 4.4 05/30/2022 10:10 AM    BILITOT 0.64 05/30/2022 10:10 AM    ALKPHOS 113 05/30/2022 10:10 AM    AST 19 05/30/2022 10:10 AM    ALT 21 05/30/2022 10:10 AM     Lab Results   Component Value Date/Time    WBC 8.3 07/09/2022 08:16 AM    RBC 4.57 07/09/2022 08:16 AM    HGB 13.9 07/09/2022 08:16 AM    HCT 41.8 07/09/2022 08:16 AM    MCV 91.5 07/09/2022 08:16 AM    MCH 30.4 07/09/2022 08:16 AM    MCHC 33.3 07/09/2022 08:16 AM    RDW 13.2 07/09/2022 08:16 AM     07/09/2022 08:16 AM    MPV 8.7 07/09/2022 08:16 AM     Lab Results   Component Value Date/Time    TSH 1.79 07/09/2022 08:16 AM     Lab Results   Component Value Date/Time    CHOL 210 07/09/2022 08:16 AM    HDL 55 07/09/2022 08:16 AM       Assessment/Plan:      Diagnosis Orders   1. Primary hypertension  lisinopril (PRINIVIL;ZESTRIL) 20 MG tablet    ALT    AST    CBC with Auto Differential    Basic Metabolic Panel      2. Subclinical hypothyroidism  T4, Free    TSH    Lipid Panel      3. Anxiety  hydrOXYzine HCl (ATARAX) 25 MG tablet        We will increase lisinopril to 20 mg daily. Continue all other medications. We will see her back in 2 weeks for BP check, sooner if any issues. Start hydroxyzine 25 mg as needed for anxiety up to 3 times daily. Call in a few weeks with progress. Sooner if any issues. We will see her back in 6 months for routine visit, sooner if any issues. 1.  Aurther Ohs received counseling on the following healthy behaviors: nutrition, exercise, and medication adherence  2. Patient given educational materials - see patient instructions  3. Was a self-tracking handout given in paper form or via MyChart? No  If yes, see orders or list here. 4.  Discussed use, benefit, and side effects of prescribed medications. Barriers to medication compliance addressed. All patient questions answered. Pt voiced understanding. 5.  Reviewed prior labs and health maintenance  6. Continue current medications, diet and exercise. Completed Refills   Requested Prescriptions     Signed Prescriptions Disp Refills    lisinopril (PRINIVIL;ZESTRIL) 20 MG tablet 90 tablet 1     Sig: Take 1 tablet by mouth daily    hydrOXYzine HCl (ATARAX) 25 MG tablet 90 tablet 2     Sig: Take 1 tablet by mouth every 8 hours as needed for Anxiety         Return in about 6 months (around 6/5/2023) for Check up.

## 2022-12-28 ENCOUNTER — NURSE ONLY (OUTPATIENT)
Dept: PRIMARY CARE CLINIC | Age: 42
End: 2022-12-28

## 2022-12-28 VITALS
WEIGHT: 188.1 LBS | DIASTOLIC BLOOD PRESSURE: 84 MMHG | HEART RATE: 81 BPM | RESPIRATION RATE: 18 BRPM | BODY MASS INDEX: 30.36 KG/M2 | TEMPERATURE: 97.9 F | OXYGEN SATURATION: 98 % | SYSTOLIC BLOOD PRESSURE: 126 MMHG

## 2022-12-28 ASSESSMENT — PATIENT HEALTH QUESTIONNAIRE - PHQ9
SUM OF ALL RESPONSES TO PHQ9 QUESTIONS 1 & 2: 0
6. FEELING BAD ABOUT YOURSELF - OR THAT YOU ARE A FAILURE OR HAVE LET YOURSELF OR YOUR FAMILY DOWN: 0
SUM OF ALL RESPONSES TO PHQ QUESTIONS 1-9: 0
1. LITTLE INTEREST OR PLEASURE IN DOING THINGS: 0
7. TROUBLE CONCENTRATING ON THINGS, SUCH AS READING THE NEWSPAPER OR WATCHING TELEVISION: 0
2. FEELING DOWN, DEPRESSED OR HOPELESS: 0
SUM OF ALL RESPONSES TO PHQ QUESTIONS 1-9: 0
8. MOVING OR SPEAKING SO SLOWLY THAT OTHER PEOPLE COULD HAVE NOTICED. OR THE OPPOSITE, BEING SO FIGETY OR RESTLESS THAT YOU HAVE BEEN MOVING AROUND A LOT MORE THAN USUAL: 0
10. IF YOU CHECKED OFF ANY PROBLEMS, HOW DIFFICULT HAVE THESE PROBLEMS MADE IT FOR YOU TO DO YOUR WORK, TAKE CARE OF THINGS AT HOME, OR GET ALONG WITH OTHER PEOPLE: 0
SUM OF ALL RESPONSES TO PHQ QUESTIONS 1-9: 0
5. POOR APPETITE OR OVEREATING: 0
4. FEELING TIRED OR HAVING LITTLE ENERGY: 0
9. THOUGHTS THAT YOU WOULD BE BETTER OFF DEAD, OR OF HURTING YOURSELF: 0
3. TROUBLE FALLING OR STAYING ASLEEP: 0
SUM OF ALL RESPONSES TO PHQ QUESTIONS 1-9: 0

## 2022-12-28 NOTE — PATIENT INSTRUCTIONS
SURVEY:     You may be receiving a survey from [employer] regarding your visit today. Please complete the survey to enable us to provide the highest quality of care to you and your family. If you cannot score us a very good on any question, please call the office to discuss how we could have made your experience a very good one.      Thank you,    Katarzyna Hernadez, APRN-CNP  Abby Martell, APRN-CNP  Janiya Khoury, LPN  Arelis Jones, CMA  Van Marroquin, CMA  Vianca, CMA  Claire, PCA  Patsy, PM

## 2023-02-17 ENCOUNTER — OFFICE VISIT (OUTPATIENT)
Dept: PRIMARY CARE CLINIC | Age: 43
End: 2023-02-17
Payer: COMMERCIAL

## 2023-02-17 VITALS
TEMPERATURE: 97.7 F | DIASTOLIC BLOOD PRESSURE: 78 MMHG | RESPIRATION RATE: 18 BRPM | OXYGEN SATURATION: 100 % | HEART RATE: 82 BPM | SYSTOLIC BLOOD PRESSURE: 128 MMHG | BODY MASS INDEX: 30.1 KG/M2 | WEIGHT: 186.5 LBS

## 2023-02-17 DIAGNOSIS — R13.19 ESOPHAGEAL DYSPHAGIA: ICD-10-CM

## 2023-02-17 DIAGNOSIS — E66.09 CLASS 1 OBESITY DUE TO EXCESS CALORIES WITHOUT SERIOUS COMORBIDITY WITH BODY MASS INDEX (BMI) OF 30.0 TO 30.9 IN ADULT: ICD-10-CM

## 2023-02-17 DIAGNOSIS — K21.00 GASTROESOPHAGEAL REFLUX DISEASE WITH ESOPHAGITIS WITHOUT HEMORRHAGE: Primary | ICD-10-CM

## 2023-02-17 PROBLEM — E66.811 CLASS 1 OBESITY DUE TO EXCESS CALORIES WITHOUT SERIOUS COMORBIDITY WITH BODY MASS INDEX (BMI) OF 30.0 TO 30.9 IN ADULT: Status: ACTIVE | Noted: 2023-02-17

## 2023-02-17 PROCEDURE — 3074F SYST BP LT 130 MM HG: CPT | Performed by: NURSE PRACTITIONER

## 2023-02-17 PROCEDURE — 3078F DIAST BP <80 MM HG: CPT | Performed by: NURSE PRACTITIONER

## 2023-02-17 PROCEDURE — 99214 OFFICE O/P EST MOD 30 MIN: CPT | Performed by: NURSE PRACTITIONER

## 2023-02-17 RX ORDER — PANTOPRAZOLE SODIUM 40 MG/1
40 TABLET, DELAYED RELEASE ORAL
Qty: 90 TABLET | Refills: 1 | Status: SHIPPED | OUTPATIENT
Start: 2023-02-17

## 2023-02-17 RX ORDER — PHENTERMINE HYDROCHLORIDE 37.5 MG/1
37.5 CAPSULE ORAL EVERY MORNING
Qty: 30 CAPSULE | Refills: 0 | Status: SHIPPED | OUTPATIENT
Start: 2023-02-17 | End: 2023-03-19

## 2023-02-17 SDOH — ECONOMIC STABILITY: FOOD INSECURITY: WITHIN THE PAST 12 MONTHS, THE FOOD YOU BOUGHT JUST DIDN'T LAST AND YOU DIDN'T HAVE MONEY TO GET MORE.: PATIENT DECLINED

## 2023-02-17 SDOH — ECONOMIC STABILITY: INCOME INSECURITY: HOW HARD IS IT FOR YOU TO PAY FOR THE VERY BASICS LIKE FOOD, HOUSING, MEDICAL CARE, AND HEATING?: PATIENT DECLINED

## 2023-02-17 SDOH — ECONOMIC STABILITY: FOOD INSECURITY: WITHIN THE PAST 12 MONTHS, YOU WORRIED THAT YOUR FOOD WOULD RUN OUT BEFORE YOU GOT MONEY TO BUY MORE.: PATIENT DECLINED

## 2023-02-17 SDOH — ECONOMIC STABILITY: HOUSING INSECURITY
IN THE LAST 12 MONTHS, WAS THERE A TIME WHEN YOU DID NOT HAVE A STEADY PLACE TO SLEEP OR SLEPT IN A SHELTER (INCLUDING NOW)?: PATIENT REFUSED

## 2023-02-17 ASSESSMENT — PATIENT HEALTH QUESTIONNAIRE - PHQ9
5. POOR APPETITE OR OVEREATING: 0
SUM OF ALL RESPONSES TO PHQ QUESTIONS 1-9: 0
4. FEELING TIRED OR HAVING LITTLE ENERGY: 0
SUM OF ALL RESPONSES TO PHQ QUESTIONS 1-9: 0
9. THOUGHTS THAT YOU WOULD BE BETTER OFF DEAD, OR OF HURTING YOURSELF: 0
3. TROUBLE FALLING OR STAYING ASLEEP: 0
10. IF YOU CHECKED OFF ANY PROBLEMS, HOW DIFFICULT HAVE THESE PROBLEMS MADE IT FOR YOU TO DO YOUR WORK, TAKE CARE OF THINGS AT HOME, OR GET ALONG WITH OTHER PEOPLE: 0
8. MOVING OR SPEAKING SO SLOWLY THAT OTHER PEOPLE COULD HAVE NOTICED. OR THE OPPOSITE, BEING SO FIGETY OR RESTLESS THAT YOU HAVE BEEN MOVING AROUND A LOT MORE THAN USUAL: 0
6. FEELING BAD ABOUT YOURSELF - OR THAT YOU ARE A FAILURE OR HAVE LET YOURSELF OR YOUR FAMILY DOWN: 0
SUM OF ALL RESPONSES TO PHQ9 QUESTIONS 1 & 2: 0
SUM OF ALL RESPONSES TO PHQ QUESTIONS 1-9: 0
1. LITTLE INTEREST OR PLEASURE IN DOING THINGS: 0
7. TROUBLE CONCENTRATING ON THINGS, SUCH AS READING THE NEWSPAPER OR WATCHING TELEVISION: 0
SUM OF ALL RESPONSES TO PHQ QUESTIONS 1-9: 0
2. FEELING DOWN, DEPRESSED OR HOPELESS: 0

## 2023-02-17 ASSESSMENT — ENCOUNTER SYMPTOMS
CHOKING: 1
TROUBLE SWALLOWING: 1
HEARTBURN: 0
VOMITING: 0
HOARSE VOICE: 0
GLOBUS SENSATION: 1
NAUSEA: 0
SHORTNESS OF BREATH: 0
ABDOMINAL PAIN: 0
STRIDOR: 0
DIARRHEA: 0
SORE THROAT: 0
BELCHING: 1
WATER BRASH: 1
COUGH: 0
CONSTIPATION: 0
RHINORRHEA: 0
WHEEZING: 0

## 2023-02-17 NOTE — PROGRESS NOTES
Name: Ferny Sunshine  : 1980         Chief Complaint:     Chief Complaint   Patient presents with    Choking     Need referral for esophagus to be stretched. History of Present Illness:      Feryn Sunshine is a 38 y.o.  female who presents with Choking (Need referral for esophagus to be stretched. )      Tiffanie Gonzalez is here today for a routine office visit. Dysphagia-patient states she has been having issues with swallowing. She states this is an ongoing issue that is been happening for at least the last 15 years. She has had EGD with dilation in the past.  She states last time this procedure was done was in [facility] around 7 years ago. She does not have a GI provider in this area. See below for further comments. Obesity-despite her best efforts she is unable to lose weight. She states she has tried diet, exercise, nutritional counseling. She would like medication to help with weight loss. Gastroesophageal Reflux  She complains of belching, choking, dysphagia, early satiety, globus sensation and water brash. She reports no abdominal pain, no chest pain, no coughing, no heartburn, no hoarse voice, no nausea, no sore throat, no stridor, no tooth decay or no wheezing. This is a chronic problem. The current episode started more than 1 year ago. The problem occurs frequently. The problem has been gradually worsening. The symptoms are aggravated by certain foods. Pertinent negatives include no anemia, fatigue, melena, muscle weakness, orthopnea or weight loss. Risk factors include obesity, lack of exercise and caffeine use. She has tried nothing for the symptoms. The treatment provided no relief. Past procedures include an EGD. Past procedures do not include a UGI. Past invasive treatments do not include gastroplasty, gastroplication or reflux surgery.        Past Medical History:     Past Medical History:   Diagnosis Date    Esophageal stenosis     Thyroid disease Reviewed all health maintenance requirements and ordered appropriate tests  There are no preventive care reminders to display for this patient. Past Surgical History:     Past Surgical History:   Procedure Laterality Date    DILATION AND CURETTAGE OF UTERUS N/A 6/25/2021    DILATATION AND CURETTAGE HYSTEROSCOPY CAUTERY ABLATION, JANEL performed by Staci Alexander DO at [address] [facility]      stretched x 8    SALPINGECTOMY Bilateral 06/25/2021    WITH DR. FAULKNER    SALPINGECTOMY Bilateral 6/25/2021    SALPINGECTOMY LAPAROSCOPIC performed by Staci Alexander DO at [address]          Medications:       Prior to Admission medications    Medication Sig Start Date End Date Taking? Authorizing Provider   phentermine (ADIPEX-P) 37.5 MG capsule Take 1 capsule by mouth every morning for 30 days. Max Daily Amount: 37.5 mg 2/17/23 3/19/23 Yes CLOTILDE Ariza CNP   pantoprazole (PROTONIX) 40 MG tablet Take 1 tablet by mouth every morning (before breakfast) 2/17/23  Yes CLOTILDE Ariza CNP   lisinopril (PRINIVIL;ZESTRIL) 20 MG tablet Take 1 tablet by mouth daily 12/5/22  Yes CLOTILDE Ariza CNP   levothyroxine (SYNTHROID) 50 MCG tablet Take 1 tablet by mouth daily 6/23/22  Yes CLOTILDE Bazan CNP   Multiple Vitamins-Minerals (THERAPEUTIC MULTIVITAMIN-MINERALS) tablet Take 1 tablet by mouth daily   Yes Historical Provider, MD   hydrOXYzine HCl (ATARAX) 25 MG tablet Take 1 tablet by mouth every 8 hours as needed for Anxiety  Patient not taking: Reported on 2/17/2023 12/5/22   CLOTILDE Ariza CNP        Allergies:       Latex and Ceclor [cefaclor]    Social History:     Tobacco:    reports that she quit smoking about 8 years ago. Her smoking use included cigarettes. She has never used smokeless tobacco.  Alcohol:      reports current alcohol use. Drug Use:  reports no history of drug use.     Family History:     Family History   Problem Relation Age of Onset    No Known Problems Mother     Cancer Father     Hypertension Father     Heart Disease Father     Hypertension Sister     Other Other         No family history of breast or ovarian cancer. Pat. uncle DVT. Review of Systems:     Positive and Negative as described in HPI    Review of Systems   Constitutional:  Positive for unexpected weight change. Negative for chills, fatigue, fever and weight loss. HENT:  Positive for trouble swallowing. Negative for congestion, hoarse voice, rhinorrhea and sore throat. Eyes:  Negative for visual disturbance. Respiratory:  Positive for choking. Negative for cough, shortness of breath and wheezing. Cardiovascular:  Negative for chest pain and palpitations. Gastrointestinal:  Positive for dysphagia. Negative for abdominal pain, constipation, diarrhea, heartburn, melena, nausea and vomiting. Genitourinary:  Negative for difficulty urinating and dysuria. Musculoskeletal:  Negative for gait problem, muscle weakness, neck pain and neck stiffness. Skin:  Negative for rash. Neurological:  Negative for dizziness, syncope, light-headedness and headaches. Physical Exam:   Vitals:  /78 (Position: Sitting)   Pulse 82   Temp 97.7 °F (36.5 °C) (Temporal)   Resp 18   Wt 186 lb 8 oz (84.6 kg)   SpO2 100%   BMI 30.10 kg/m²     Physical Exam  Vitals and nursing note reviewed. Constitutional:       General: She is not in acute distress. Appearance: Normal appearance. She is obese. She is not ill-appearing. HENT:      Mouth/Throat:      Mouth: Mucous membranes are moist.      Pharynx: Oropharynx is clear. Eyes:      General: No scleral icterus. Conjunctiva/sclera: Conjunctivae normal.   Cardiovascular:      Rate and Rhythm: Normal rate and regular rhythm. Heart sounds: No murmur heard. Pulmonary:      Effort: Pulmonary effort is normal.      Breath sounds: Normal breath sounds.    Abdominal: General: Bowel sounds are normal. There is no distension. Palpations: Abdomen is soft. Tenderness: There is no abdominal tenderness. Musculoskeletal:         General: Normal range of motion. Cervical back: Normal range of motion and neck supple. Lymphadenopathy:      Cervical: No cervical adenopathy. Skin:     General: Skin is warm and dry. Findings: No rash. Neurological:      Mental Status: She is alert and oriented to person, place, and time. Psychiatric:         Mood and Affect: Mood normal.         Behavior: Behavior normal.       Data:     Lab Results   Component Value Date/Time     07/09/2022 08:16 AM    K 4.3 07/09/2022 08:16 AM     07/09/2022 08:16 AM    CO2 22 07/09/2022 08:16 AM    BUN 13 07/09/2022 08:16 AM    CREATININE 0.56 07/09/2022 08:16 AM    GLUCOSE 94 07/09/2022 08:16 AM    PROT 7.3 05/30/2022 10:10 AM    LABALBU 4.4 05/30/2022 10:10 AM    BILITOT 0.64 05/30/2022 10:10 AM    ALKPHOS 113 05/30/2022 10:10 AM    AST 19 05/30/2022 10:10 AM    ALT 21 05/30/2022 10:10 AM     Lab Results   Component Value Date/Time    WBC 8.3 07/09/2022 08:16 AM    RBC 4.57 07/09/2022 08:16 AM    HGB 13.9 07/09/2022 08:16 AM    HCT 41.8 07/09/2022 08:16 AM    MCV 91.5 07/09/2022 08:16 AM    MCH 30.4 07/09/2022 08:16 AM    MCHC 33.3 07/09/2022 08:16 AM    RDW 13.2 07/09/2022 08:16 AM     07/09/2022 08:16 AM    MPV 8.7 07/09/2022 08:16 AM     Lab Results   Component Value Date/Time    TSH 1.79 07/09/2022 08:16 AM     Lab Results   Component Value Date/Time    CHOL 210 07/09/2022 08:16 AM    HDL 55 07/09/2022 08:16 AM       Assessment/Plan:      Diagnosis Orders   1. Gastroesophageal reflux disease with esophagitis without hemorrhage  pantoprazole (PROTONIX) 40 MG tablet    Josemanuel Barron MD, Gastroenterology, [place]      2. Esophageal dysphagia  pantoprazole (PROTONIX) 40 MG tablet    Josemanuel Barron MD, Gastroenterology, [place]      3.  Class 1 obesity due to excess calories without serious comorbidity with body mass index (BMI) of 30.0 to 30.9 in adult  phentermine (ADIPEX-P) 37.5 MG capsule        I would like her to start pantoprazole daily. We will refer to GI for further evaluation. Phentermine Rx given. Will need to return for monthly weight and BP checks. 1.  Cristian Chang received counseling on the following healthy behaviors: nutrition, exercise, and medication adherence  2. Patient given educational materials - see patient instructions  3. Was a self-tracking handout given in paper form or via [employer]? No  If yes, see orders or list here. 4.  Discussed use, benefit, and side effects of prescribed medications. Barriers to medication compliance addressed. All patient questions answered. Pt voiced understanding. 5.  Reviewed prior labs and health maintenance  6. Continue current medications, diet and exercise. Completed Refills   Requested Prescriptions     Signed Prescriptions Disp Refills    phentermine (ADIPEX-P) 37.5 MG capsule 30 capsule 0     Sig: Take 1 capsule by mouth every morning for 30 days. Max Daily Amount: 37.5 mg    pantoprazole (PROTONIX) 40 MG tablet 90 tablet 1     Sig: Take 1 tablet by mouth every morning (before breakfast)         Return if symptoms worsen or fail to improve.

## 2023-02-17 NOTE — PATIENT INSTRUCTIONS
SURVEY:     You may be receiving a survey from [employer] regarding your visit today. Please complete the survey to enable us to provide the highest quality of care to you and your family. If you cannot score us a very good on any question, please call the office to discuss how we could have made your experience a very good one.      Thank you,    Willy Hernadez, APRN-CNP  Bharat Nguyen, APRN-CNP  Jose Bonner, JANAY Ruiz, ESSENCE Snowden, ESSENCE Coley, CMA  Claire, MCKENNA Garner, PM

## 2023-03-01 ENCOUNTER — HOSPITAL ENCOUNTER (OUTPATIENT)
Age: 43
Setting detail: SPECIMEN
Discharge: HOME | End: 2023-03-01
Payer: COMMERCIAL

## 2023-03-01 ENCOUNTER — OFFICE VISIT (OUTPATIENT)
Dept: OBGYN | Age: 43
End: 2023-03-01

## 2023-03-01 VITALS
SYSTOLIC BLOOD PRESSURE: 126 MMHG | DIASTOLIC BLOOD PRESSURE: 80 MMHG | BODY MASS INDEX: 29.18 KG/M2 | HEIGHT: 66 IN | WEIGHT: 181.6 LBS

## 2023-03-01 DIAGNOSIS — Z12.4 SCREENING FOR CERVICAL CANCER: ICD-10-CM

## 2023-03-01 DIAGNOSIS — Z01.419 ENCOUNTER FOR ANNUAL ROUTINE GYNECOLOGICAL EXAMINATION: Primary | ICD-10-CM

## 2023-03-01 DIAGNOSIS — N92.6 IRREGULAR MENSES: ICD-10-CM

## 2023-03-01 DIAGNOSIS — Z12.31 SCREENING MAMMOGRAM, ENCOUNTER FOR: ICD-10-CM

## 2023-03-01 DIAGNOSIS — N94.6 DYSMENORRHEA: ICD-10-CM

## 2023-03-01 PROCEDURE — G0145 SCR C/V CYTO,THINLAYER,RESCR: HCPCS

## 2023-03-01 RX ORDER — ACETAMINOPHEN AND CODEINE PHOSPHATE 120; 12 MG/5ML; MG/5ML
1 SOLUTION ORAL DAILY
Qty: 28 TABLET | Refills: 12 | Status: SHIPPED | OUTPATIENT
Start: 2023-03-01

## 2023-03-01 NOTE — PROGRESS NOTES
YEARLY PHYSICAL    Date of service: 3/1/2023    Pranay Lake  Is a 38 y.o.  single, female    PT's PCP is: Tayla Hernadez, CLOTILDE - ANASTASIIA     : 1980                                             Subjective:       Patient's last menstrual period was 2023 (exact date). Are your menses regular: yes    OB History    Para Term  AB Living   1 1 1     1   SAB IAB Ectopic Molar Multiple Live Births             1      # Outcome Date GA Lbr Lei/2nd Weight Sex Delivery Anes PTL Lv   1 Term 16 40w0d   M Vag-Spont   CRESCENCIO        Social History     Tobacco Use   Smoking Status Former    Types: Cigarettes    Quit date: 2015    Years since quittin.1   Smokeless Tobacco Never        Social History     Substance and Sexual Activity   Alcohol Use Yes    Comment: social 1-2 times weekly       Family History   Problem Relation Age of Onset    No Known Problems Mother     Cancer Father     Hypertension Father     Heart Disease Father     Hypertension Sister     Other Other         No family history of breast or ovarian cancer. Pat. uncle DVT. Any family history of breast or ovarian cancer: No    Any family history of blood clots: No    Have you had a positive covid test: Yes    Have you had the covid immunization: Yes      Allergies: Latex and Ceclor [cefaclor]      Current Outpatient Medications:     norethindrone (ORTHO MICRONOR) 0.35 MG tablet, Take 1 tablet by mouth daily, Disp: 28 tablet, Rfl: 12    phentermine (ADIPEX-P) 37.5 MG capsule, Take 1 capsule by mouth every morning for 30 days.  Max Daily Amount: 37.5 mg, Disp: 30 capsule, Rfl: 0    lisinopril (PRINIVIL;ZESTRIL) 20 MG tablet, Take 1 tablet by mouth daily, Disp: 90 tablet, Rfl: 1    levothyroxine (SYNTHROID) 50 MCG tablet, Take 1 tablet by mouth daily, Disp: 90 tablet, Rfl: 3    Multiple Vitamins-Minerals (THERAPEUTIC MULTIVITAMIN-MINERALS) tablet, Take 1 tablet by mouth daily, Disp: , Rfl:     Social History     Substance and Sexual Activity   Sexual Activity Yes    Partners: Male    Birth control/protection: Condom       Any bleeding or pain with intercourse: No    Last Yearly date:  04/20/2021    Last pap date and results: 04/202021 negative    Last HPV date and results: 04/20/2021 negative    Last Mammogram date and results: never    Last Dexa scan date and results: never    Last colorectal screen- type:never*  date  never results never    Do you do self breast exams: No    Past Medical History:   Diagnosis Date    Esophageal stenosis     Hypertension     Thyroid disease        Past Surgical History:   Procedure Laterality Date    DILATION AND CURETTAGE OF UTERUS N/A 06/25/2021    DILATATION AND CURETTAGE HYSTEROSCOPY CAUTERY ABLATION, NOVASURE performed by Nnamdi Cavazos DO at [address] [place] [address] 06/25/2021    ESOPHAGUS SURGERY      stretched x 8    SALPINGECTOMY Bilateral 06/25/2021    WITH DR. FAULKNER    SALPINGECTOMY Bilateral 06/25/2021    SALPINGECTOMY LAPAROSCOPIC performed by Nnamdi Cavazos DO at [address] [place] [address]         Family History   Problem Relation Age of Onset    No Known Problems Mother     Cancer Father     Hypertension Father     Heart Disease Father     Hypertension Sister     Other Other         No family history of breast or ovarian cancer. Pat. uncle DVT. Chief Complaint   Patient presents with    Gynecologic Exam     Yearly exam. Last pap 04/20/2021 negative, HPV not detected. C/O hip apin and back pain week prior to and during periods. PE:  Vital Signs  Blood pressure 126/80, height 5' 6\" (1.676 m), weight 181 lb 9.6 oz (82.4 kg), last menstrual period 02/17/2023, not currently breastfeeding. Estimated body mass index is 29.31 kg/m² as calculated from the following:    Height as of this encounter: 5' 6\" (1.676 m).     Weight as of this encounter: 181 lb 9.6 oz (82.4 kg). Labs:    No results found for this visit on 03/01/23. No data recorded    NURSE: Fernando GUSTAFSON    HPI: here for annual gyn exam, c/o back and pelvic pain just before and during periods, ablation has made periods lighter but feels like she has more pain now. Review of Systems   Constitutional: Negative. HENT:  Negative for congestion. Respiratory:  Negative for shortness of breath. Cardiovascular:  Negative for chest pain. Gastrointestinal:  Negative for abdominal pain. Genitourinary:  Positive for pelvic pain. Negative for dysuria. Musculoskeletal:  Positive for back pain. Skin:  Negative for rash. Neurological:  Negative for headaches. Psychiatric/Behavioral:  The patient is not nervous/anxious. Objective  Lymphatic:   no lymphadenopathy  Heent:   negative   Cor: regular rate and rhythm, no murmurs              Pul:clear to auscultation bilaterally- no wheezes, rales or rhonchi, normal air movement, no respiratory distress      GI: Abdomen soft, non-tender. BS normal. No masses,  No organomegaly           Extremities: normal strength, tone, and muscle mass   Breasts: Breast:normal appearance, no masses or tenderness   Pelvic Exam: GENITAL/URINARY:  External Genitalia:  General appearance; normal, Hair distribution; normal, Lesions absent  Urethral Meatus:  Size normal, Location normal, Lesions absent, Prolapse absent  Urethra: Fullness absent, Masses absent  Bladder:  Fullness absent, Masses absent, Tenderness absent, Cystocele absent  Vagina:  General appearance normal, Estrogen effect normal, Discharge absent, Lesions absent, Pelvic support normal  Cervix:  General appearance normal, Lesions absent, Discharge absent, Tenderness absent, Enlargement absent, Nodularity absent  Uterus:  Size normal, Tenderness absent  Adenexa:   Masses absent, Tenderness absent  Anus/Perineum:  Lesions absent and Masses absent Assessment and Plan          Diagnosis Orders   1. Encounter for annual routine gynecological examination        2. Screening mammogram, encounter for  DAPHNE LONNIE DIGITAL SCREEN BILATERAL      3. Screening for cervical cancer  PAP SMEAR      4. Irregular menses        5. Dysmenorrhea  norethindrone (ORTHO MICRONOR) 0.35 MG tablet        Questions endometriosis and/ or fibromyalgia; doubt endo due to having lap for the salpingectomy in 2021 and wouldve seen it then, will talk to pcp re: fibro;  would like to consider corbin bso if dysmenorrhea continues as it has a effect on her lifestyle. I have discontinued Isa BRIDGET Conde's hydrOXYzine HCl and pantoprazole. I am also having her start on norethindrone. Additionally, I am having her maintain her therapeutic multivitamin-minerals, levothyroxine, lisinopril, and phentermine. Return considering consult w Dr. Piper Boss for hyst.    She was also counseled on her preventative health maintenance recommendations and follow-up. There are no Patient Instructions on file for this visit.     CLOTILDE Vu CNM,3/3/2023 7:47 PM

## 2023-03-03 ASSESSMENT — ENCOUNTER SYMPTOMS
SHORTNESS OF BREATH: 0
BACK PAIN: 1
ABDOMINAL PAIN: 0

## 2023-03-17 ENCOUNTER — NURSE ONLY (OUTPATIENT)
Dept: PRIMARY CARE CLINIC | Age: 43
End: 2023-03-17

## 2023-03-17 VITALS
BODY MASS INDEX: 28.26 KG/M2 | WEIGHT: 175.1 LBS | OXYGEN SATURATION: 100 % | RESPIRATION RATE: 18 BRPM | SYSTOLIC BLOOD PRESSURE: 124 MMHG | HEART RATE: 89 BPM | DIASTOLIC BLOOD PRESSURE: 74 MMHG | TEMPERATURE: 97.8 F

## 2023-03-17 DIAGNOSIS — E66.09 CLASS 1 OBESITY DUE TO EXCESS CALORIES WITHOUT SERIOUS COMORBIDITY WITH BODY MASS INDEX (BMI) OF 30.0 TO 30.9 IN ADULT: ICD-10-CM

## 2023-03-17 RX ORDER — PHENTERMINE HYDROCHLORIDE 37.5 MG/1
37.5 CAPSULE ORAL EVERY MORNING
Qty: 30 CAPSULE | Refills: 0 | Status: SHIPPED | OUTPATIENT
Start: 2023-03-17 | End: 2023-04-16

## 2023-04-17 ENCOUNTER — NURSE ONLY (OUTPATIENT)
Dept: PRIMARY CARE CLINIC | Age: 43
End: 2023-04-17

## 2023-04-17 VITALS
TEMPERATURE: 98.7 F | HEART RATE: 84 BPM | DIASTOLIC BLOOD PRESSURE: 74 MMHG | SYSTOLIC BLOOD PRESSURE: 128 MMHG | RESPIRATION RATE: 18 BRPM | OXYGEN SATURATION: 100 % | WEIGHT: 174 LBS | BODY MASS INDEX: 28.08 KG/M2

## 2023-04-17 DIAGNOSIS — E66.09 CLASS 1 OBESITY DUE TO EXCESS CALORIES WITHOUT SERIOUS COMORBIDITY WITH BODY MASS INDEX (BMI) OF 30.0 TO 30.9 IN ADULT: ICD-10-CM

## 2023-04-17 RX ORDER — PHENTERMINE HYDROCHLORIDE 37.5 MG/1
37.5 CAPSULE ORAL EVERY MORNING
Qty: 30 CAPSULE | Refills: 0 | Status: SHIPPED | OUTPATIENT
Start: 2023-04-17 | End: 2023-05-17

## 2023-04-17 NOTE — PATIENT INSTRUCTIONS
SURVEY:     You may be receiving a survey from [email or web address] regarding your visit today. Please complete the survey to enable us to provide the highest quality of care to you and your family. If you cannot score us a very good on any question, please call the office to discuss how we could have made your experience a very good one.      Thank you,    Enoc Hernadez, APRN-CNP  Adamaris Mcclure, APRN-CNP  JANAY Escalante, ESSENCE Menjivar, ESSENCE Coley, CMA  Claire, PCA  Patsy, PM

## 2023-04-26 ENCOUNTER — OFFICE VISIT (OUTPATIENT)
Dept: PRIMARY CARE CLINIC | Age: 43
End: 2023-04-26
Payer: COMMERCIAL

## 2023-04-26 VITALS
RESPIRATION RATE: 20 BRPM | TEMPERATURE: 97.7 F | BODY MASS INDEX: 28.41 KG/M2 | WEIGHT: 176 LBS | SYSTOLIC BLOOD PRESSURE: 112 MMHG | OXYGEN SATURATION: 100 % | HEART RATE: 84 BPM | DIASTOLIC BLOOD PRESSURE: 70 MMHG

## 2023-04-26 DIAGNOSIS — F41.9 ANXIETY: ICD-10-CM

## 2023-04-26 DIAGNOSIS — R42 VERTIGO: Primary | ICD-10-CM

## 2023-04-26 DIAGNOSIS — S46.911A RIGHT SHOULDER STRAIN, INITIAL ENCOUNTER: ICD-10-CM

## 2023-04-26 PROCEDURE — 3074F SYST BP LT 130 MM HG: CPT | Performed by: NURSE PRACTITIONER

## 2023-04-26 PROCEDURE — 1036F TOBACCO NON-USER: CPT | Performed by: NURSE PRACTITIONER

## 2023-04-26 PROCEDURE — G8417 CALC BMI ABV UP PARAM F/U: HCPCS | Performed by: NURSE PRACTITIONER

## 2023-04-26 PROCEDURE — 3078F DIAST BP <80 MM HG: CPT | Performed by: NURSE PRACTITIONER

## 2023-04-26 PROCEDURE — G8427 DOCREV CUR MEDS BY ELIG CLIN: HCPCS | Performed by: NURSE PRACTITIONER

## 2023-04-26 PROCEDURE — 99214 OFFICE O/P EST MOD 30 MIN: CPT | Performed by: NURSE PRACTITIONER

## 2023-04-26 RX ORDER — MECLIZINE HCL 12.5 MG/1
12.5 TABLET ORAL 3 TIMES DAILY PRN
Qty: 30 TABLET | Refills: 0 | Status: SHIPPED | OUTPATIENT
Start: 2023-04-26 | End: 2023-05-06

## 2023-04-26 SDOH — ECONOMIC STABILITY: INCOME INSECURITY: HOW HARD IS IT FOR YOU TO PAY FOR THE VERY BASICS LIKE FOOD, HOUSING, MEDICAL CARE, AND HEATING?: PATIENT DECLINED

## 2023-04-26 SDOH — ECONOMIC STABILITY: FOOD INSECURITY: WITHIN THE PAST 12 MONTHS, THE FOOD YOU BOUGHT JUST DIDN'T LAST AND YOU DIDN'T HAVE MONEY TO GET MORE.: PATIENT DECLINED

## 2023-04-26 SDOH — ECONOMIC STABILITY: FOOD INSECURITY: WITHIN THE PAST 12 MONTHS, YOU WORRIED THAT YOUR FOOD WOULD RUN OUT BEFORE YOU GOT MONEY TO BUY MORE.: PATIENT DECLINED

## 2023-04-26 ASSESSMENT — ENCOUNTER SYMPTOMS
WHEEZING: 0
ABDOMINAL PAIN: 0
DIARRHEA: 0
SORE THROAT: 0
CONSTIPATION: 0
RHINORRHEA: 0
SHORTNESS OF BREATH: 0
VOMITING: 0
NAUSEA: 0
COUGH: 0

## 2023-04-26 ASSESSMENT — PATIENT HEALTH QUESTIONNAIRE - PHQ9
6. FEELING BAD ABOUT YOURSELF - OR THAT YOU ARE A FAILURE OR HAVE LET YOURSELF OR YOUR FAMILY DOWN: 0
4. FEELING TIRED OR HAVING LITTLE ENERGY: 0
SUM OF ALL RESPONSES TO PHQ QUESTIONS 1-9: 0
2. FEELING DOWN, DEPRESSED OR HOPELESS: 0
5. POOR APPETITE OR OVEREATING: 0
SUM OF ALL RESPONSES TO PHQ9 QUESTIONS 1 & 2: 0
8. MOVING OR SPEAKING SO SLOWLY THAT OTHER PEOPLE COULD HAVE NOTICED. OR THE OPPOSITE, BEING SO FIGETY OR RESTLESS THAT YOU HAVE BEEN MOVING AROUND A LOT MORE THAN USUAL: 0
SUM OF ALL RESPONSES TO PHQ QUESTIONS 1-9: 0
SUM OF ALL RESPONSES TO PHQ QUESTIONS 1-9: 0
3. TROUBLE FALLING OR STAYING ASLEEP: 0
10. IF YOU CHECKED OFF ANY PROBLEMS, HOW DIFFICULT HAVE THESE PROBLEMS MADE IT FOR YOU TO DO YOUR WORK, TAKE CARE OF THINGS AT HOME, OR GET ALONG WITH OTHER PEOPLE: 0
9. THOUGHTS THAT YOU WOULD BE BETTER OFF DEAD, OR OF HURTING YOURSELF: 0
7. TROUBLE CONCENTRATING ON THINGS, SUCH AS READING THE NEWSPAPER OR WATCHING TELEVISION: 0
1. LITTLE INTEREST OR PLEASURE IN DOING THINGS: 0
SUM OF ALL RESPONSES TO PHQ QUESTIONS 1-9: 0

## 2023-04-26 NOTE — PROGRESS NOTES
Name: Joe Mullen  : 1980         Chief Complaint:     Chief Complaint   Patient presents with    Dizziness     Intermittently X 1 week. Denies chest pain. Muscle Pain     Right shoulder, \"was doing push up not sure if pulled muscle. \" X 1 week       History of Present Illness:      Joe Mullen is a 38 y.o.  female who presents with Dizziness (Intermittently X 1 week. Denies chest pain.) and Muscle Pain (Right shoulder, \"was doing push up not sure if pulled muscle. \" X 1 week)      [place] is here today for a routine office visit. Bit anxious due to some issues she has been having. She has been dizzy for a few weeks but has recently had a cold. She also states she had some pain in her right shoulder after doing push-ups and she was worried this could be related to her heart due to many issues in her family with heart disease. She does admit that both issues are resolving at this time. Dizziness  This is a new problem. The current episode started 1 to 4 weeks ago. The problem occurs intermittently. The problem has been rapidly improving. Associated symptoms include arthralgias (right shoulder). Pertinent negatives include no abdominal pain, chest pain, chills, congestion, coughing, fatigue, fever, headaches, nausea, neck pain, numbness, rash, sore throat or vomiting. The symptoms are aggravated by standing (POSITION). She has tried rest and drinking for the symptoms. The treatment provided significant relief. Shoulder Pain   The pain is present in the right shoulder. This is a new problem. The current episode started 1 to 4 weeks ago. There has been a history of trauma. The problem occurs intermittently. The problem has been gradually improving. The quality of the pain is described as aching. The pain is at a severity of 2/10. The pain is mild. Associated symptoms include stiffness.  Pertinent negatives include no fever, inability to bear weight, itching, joint locking, joint

## 2023-05-01 ENCOUNTER — HOSPITAL ENCOUNTER (OUTPATIENT)
Dept: WOMENS IMAGING | Age: 43
Discharge: HOME | End: 2023-05-03
Payer: COMMERCIAL

## 2023-05-01 DIAGNOSIS — Z12.31 SCREENING MAMMOGRAM, ENCOUNTER FOR: ICD-10-CM

## 2023-05-01 PROCEDURE — 77063 BREAST TOMOSYNTHESIS BI: CPT

## 2023-05-17 DIAGNOSIS — I10 PRIMARY HYPERTENSION: ICD-10-CM

## 2023-05-17 RX ORDER — LISINOPRIL 20 MG/1
TABLET ORAL
Qty: 90 TABLET | Refills: 1 | Status: SHIPPED | OUTPATIENT
Start: 2023-05-17

## 2023-05-17 NOTE — TELEPHONE ENCOUNTER
Health Maintenance   Topic Date Due    Flu vaccine (Season Ended) 09/26/2023 (Originally 8/1/2023)    COVID-19 Vaccine (3 - Booster for Moderna series) 12/05/2023 (Originally 4/1/2021)    DTaP/Tdap/Td vaccine (1 - Tdap) 04/17/2024 (Originally 8/24/2001)    Hepatitis C screen  04/17/2024 (Originally 8/24/2000)    HIV screen  04/17/2024 (Originally 8/24/1997)    Depression Monitoring  04/26/2024    Cervical cancer screen  04/20/2026    Lipids  07/09/2027    Hepatitis A vaccine  Aged Out    Hib vaccine  Aged Out    Meningococcal (ACWY) vaccine  Aged Out    Pneumococcal 0-64 years Vaccine  Aged Out    Varicella vaccine  Discontinued    Depression Screen  Discontinued             (applicable per patient's age: Cancer Screenings, Depression Screening, Fall Risk Screening, Immunizations)    LDL Cholesterol (mg/dL)   Date Value   07/09/2022 128     AST (U/L)   Date Value   05/30/2022 19     ALT (U/L)   Date Value   05/30/2022 21     BUN (mg/dL)   Date Value   07/09/2022 13      (goal A1C is < 7)   (goal LDL is <100) need 30-50% reduction from baseline     BP Readings from Last 3 Encounters:   04/26/23 112/70   04/17/23 128/74   03/17/23 124/74    (goal /80)      All Future Testing planned in [facility]:  Lab Frequency Next Occurrence   ALT Once 06/05/2023   AST Once 06/05/2023   CBC with Auto Differential Once 06/03/2023   T4, Free Once 06/03/2023   TSH Once 06/03/2023   Lipid Panel Once 97/35/6193   Basic Metabolic Panel Once 88/04/1233       Next Visit Date:  Future Appointments   Date Time Provider Santa Campbell   6/6/2023  4:20 PM Esteban Hernadez, APRN - CNP [facility]            Patient Active Problem List:     Diarrhea of infectious origin     Non-intractable vomiting with nausea     Acute nonintractable headache     Acute pharyngitis     Migraine without status migrainosus, not intractable     Coxsackie virus infection     Myalgia     Irritation of left eye     Postoperative surgical complication

## 2023-06-20 DIAGNOSIS — E03.8 SUBCLINICAL HYPOTHYROIDISM: ICD-10-CM

## 2023-06-20 RX ORDER — LEVOTHYROXINE SODIUM 0.05 MG/1
TABLET ORAL
Qty: 90 TABLET | Refills: 3 | Status: SHIPPED | OUTPATIENT
Start: 2023-06-20

## 2023-06-20 NOTE — TELEPHONE ENCOUNTER
Health Maintenance   Topic Date Due    Flu vaccine (Season Ended) 09/26/2023 (Originally 8/1/2023)    COVID-19 Vaccine (3 - Booster for Moderna series) 12/05/2023 (Originally 4/1/2021)    DTaP/Tdap/Td vaccine (1 - Tdap) 04/17/2024 (Originally 8/24/2001)    Hepatitis C screen  04/17/2024 (Originally 8/24/2000)    HIV screen  04/17/2024 (Originally 8/24/1997)    Depression Monitoring  04/26/2024    Cervical cancer screen  04/20/2026    Lipids  07/09/2027    Hepatitis A vaccine  Aged Out    Hib vaccine  Aged Out    Meningococcal (ACWY) vaccine  Aged Out    Pneumococcal 0-64 years Vaccine  Aged Out    Varicella vaccine  Discontinued    Depression Screen  Discontinued             (applicable per patient's age: Cancer Screenings, Depression Screening, Fall Risk Screening, Immunizations)    LDL Cholesterol (mg/dL)   Date Value   07/09/2022 128     AST (U/L)   Date Value   05/30/2022 19     ALT (U/L)   Date Value   05/30/2022 21     BUN (mg/dL)   Date Value   07/09/2022 13      (goal A1C is < 7)   (goal LDL is <100) need 30-50% reduction from baseline     BP Readings from Last 3 Encounters:   04/26/23 112/70   04/17/23 128/74   03/17/23 124/74    (goal /80)      All Future Testing planned in [facility]:  Lab Frequency Next Occurrence   ALT Once 06/05/2023   AST Once 06/05/2023   CBC with Auto Differential Once 06/03/2023   T4, Free Once 06/03/2023   TSH Once 06/03/2023   Lipid Panel Once 44/68/2296   Basic Metabolic Panel Once 49/48/1061       Next Visit Date:  Future Appointments   Date Time Provider Santa Campbell   7/3/2023  9:20 AM Nuha Hernadez APRN - CNP [facility]            Patient Active Problem List:     Diarrhea of infectious origin     Non-intractable vomiting with nausea     Acute nonintractable headache     Acute pharyngitis     Migraine without status migrainosus, not intractable     Coxsackie virus infection     Myalgia     Irritation of left eye     Postoperative surgical complication

## 2023-06-27 ENCOUNTER — TELEPHONE (OUTPATIENT)
Dept: PRIMARY CARE CLINIC | Age: 43
End: 2023-06-27

## 2023-07-01 ENCOUNTER — HOSPITAL ENCOUNTER (OUTPATIENT)
Age: 43
Discharge: HOME | End: 2023-07-01
Payer: COMMERCIAL

## 2023-07-01 DIAGNOSIS — I10 PRIMARY HYPERTENSION: ICD-10-CM

## 2023-07-01 DIAGNOSIS — E03.8 SUBCLINICAL HYPOTHYROIDISM: ICD-10-CM

## 2023-07-01 LAB
ALT SERPL-CCNC: 23 U/L (ref 5–33)
ANION GAP SERPL CALCULATED.3IONS-SCNC: 10 MMOL/L (ref 9–17)
AST SERPL-CCNC: 20 U/L
BASOPHILS # BLD: <0.03 K/UL (ref 0–0.2)
BASOPHILS NFR BLD: 0 % (ref 0–2)
BUN SERPL-MCNC: 14 MG/DL (ref 6–20)
BUN/CREAT SERPL: 28 (ref 9–20)
CALCIUM SERPL-MCNC: 8.9 MG/DL (ref 8.6–10.4)
CHLORIDE SERPL-SCNC: 103 MMOL/L (ref 98–107)
CHOLEST SERPL-MCNC: 177 MG/DL
CHOLESTEROL/HDL RATIO: 3.9
CO2 SERPL-SCNC: 25 MMOL/L (ref 20–31)
CREAT SERPL-MCNC: 0.5 MG/DL (ref 0.5–0.9)
EOSINOPHIL # BLD: 0.42 K/UL (ref 0–0.44)
EOSINOPHILS RELATIVE PERCENT: 6 % (ref 1–4)
ERYTHROCYTE [DISTWIDTH] IN BLOOD BY AUTOMATED COUNT: 12.5 % (ref 11.8–14.4)
GFR SERPL CREATININE-BSD FRML MDRD: >60 ML/MIN/1.73M2
GLUCOSE SERPL-MCNC: 96 MG/DL (ref 70–99)
HCT VFR BLD AUTO: 38 % (ref 36.3–47.1)
HDLC SERPL-MCNC: 45 MG/DL
HGB BLD-MCNC: 13.5 G/DL (ref 11.9–15.1)
IMM GRANULOCYTES # BLD AUTO: <0.03 K/UL (ref 0–0.3)
IMM GRANULOCYTES NFR BLD: 0 %
LDLC SERPL CALC-MCNC: 112 MG/DL (ref 0–130)
LYMPHOCYTES # BLD: 28 % (ref 24–43)
LYMPHOCYTES NFR BLD: 1.84 K/UL (ref 1.1–3.7)
MCH RBC QN AUTO: 32.1 PG (ref 25.2–33.5)
MCHC RBC AUTO-ENTMCNC: 35.5 G/DL (ref 28.4–34.8)
MCV RBC AUTO: 90.5 FL (ref 82.6–102.9)
MONOCYTES NFR BLD: 0.57 K/UL (ref 0.1–1.2)
MONOCYTES NFR BLD: 9 % (ref 3–12)
NEUTROPHILS NFR BLD: 57 % (ref 36–65)
NEUTS SEG NFR BLD: 3.72 K/UL (ref 1.5–8.1)
NRBC BLD-RTO: 0 PER 100 WBC
PLATELET # BLD AUTO: 329 K/UL (ref 138–453)
PMV BLD AUTO: 8.6 FL (ref 8.1–13.5)
POTASSIUM SERPL-SCNC: 3.7 MMOL/L (ref 3.7–5.3)
RBC # BLD AUTO: 4.2 M/UL (ref 3.95–5.11)
SODIUM SERPL-SCNC: 138 MMOL/L (ref 135–144)
T4 FREE SERPL-MCNC: 1.2 NG/DL (ref 0.9–1.7)
TRIGL SERPL-MCNC: 101 MG/DL
TSH SERPL DL<=0.05 MIU/L-ACNC: 2.51 UIU/ML (ref 0.3–5)
WBC OTHER # BLD: 6.6 K/UL (ref 3.5–11.3)

## 2023-07-01 PROCEDURE — 36415 COLL VENOUS BLD VENIPUNCTURE: CPT

## 2023-07-01 PROCEDURE — 80048 BASIC METABOLIC PNL TOTAL CA: CPT

## 2023-07-01 PROCEDURE — 84460 ALANINE AMINO (ALT) (SGPT): CPT

## 2023-07-01 PROCEDURE — 84439 ASSAY OF FREE THYROXINE: CPT

## 2023-07-01 PROCEDURE — 84450 TRANSFERASE (AST) (SGOT): CPT

## 2023-07-01 PROCEDURE — 84443 ASSAY THYROID STIM HORMONE: CPT

## 2023-07-01 PROCEDURE — 80061 LIPID PANEL: CPT

## 2023-07-01 PROCEDURE — 85027 COMPLETE CBC AUTOMATED: CPT

## 2023-07-03 ENCOUNTER — OFFICE VISIT (OUTPATIENT)
Dept: PRIMARY CARE CLINIC | Age: 43
End: 2023-07-03
Payer: COMMERCIAL

## 2023-07-03 VITALS
RESPIRATION RATE: 20 BRPM | SYSTOLIC BLOOD PRESSURE: 118 MMHG | DIASTOLIC BLOOD PRESSURE: 74 MMHG | BODY MASS INDEX: 28.88 KG/M2 | WEIGHT: 178.9 LBS | TEMPERATURE: 98.7 F | HEART RATE: 62 BPM | OXYGEN SATURATION: 98 %

## 2023-07-03 DIAGNOSIS — Z00.00 WELLNESS EXAMINATION: Primary | ICD-10-CM

## 2023-07-03 DIAGNOSIS — E03.8 SUBCLINICAL HYPOTHYROIDISM: ICD-10-CM

## 2023-07-03 DIAGNOSIS — K21.00 GASTROESOPHAGEAL REFLUX DISEASE WITH ESOPHAGITIS WITHOUT HEMORRHAGE: ICD-10-CM

## 2023-07-03 DIAGNOSIS — R13.19 ESOPHAGEAL DYSPHAGIA: ICD-10-CM

## 2023-07-03 DIAGNOSIS — I10 PRIMARY HYPERTENSION: ICD-10-CM

## 2023-07-03 PROCEDURE — 99396 PREV VISIT EST AGE 40-64: CPT | Performed by: NURSE PRACTITIONER

## 2023-07-03 PROCEDURE — 1036F TOBACCO NON-USER: CPT | Performed by: NURSE PRACTITIONER

## 2023-07-03 PROCEDURE — 99214 OFFICE O/P EST MOD 30 MIN: CPT | Performed by: NURSE PRACTITIONER

## 2023-07-03 PROCEDURE — G8417 CALC BMI ABV UP PARAM F/U: HCPCS | Performed by: NURSE PRACTITIONER

## 2023-07-03 PROCEDURE — 3078F DIAST BP <80 MM HG: CPT | Performed by: NURSE PRACTITIONER

## 2023-07-03 PROCEDURE — 3074F SYST BP LT 130 MM HG: CPT | Performed by: NURSE PRACTITIONER

## 2023-07-03 PROCEDURE — G8427 DOCREV CUR MEDS BY ELIG CLIN: HCPCS | Performed by: NURSE PRACTITIONER

## 2023-07-03 RX ORDER — LEVOTHYROXINE SODIUM 0.07 MG/1
75 TABLET ORAL DAILY
Qty: 90 TABLET | Refills: 3 | Status: SHIPPED | OUTPATIENT
Start: 2023-07-03

## 2023-07-03 SDOH — ECONOMIC STABILITY: FOOD INSECURITY: WITHIN THE PAST 12 MONTHS, THE FOOD YOU BOUGHT JUST DIDN'T LAST AND YOU DIDN'T HAVE MONEY TO GET MORE.: PATIENT DECLINED

## 2023-07-03 SDOH — ECONOMIC STABILITY: FOOD INSECURITY: WITHIN THE PAST 12 MONTHS, YOU WORRIED THAT YOUR FOOD WOULD RUN OUT BEFORE YOU GOT MONEY TO BUY MORE.: PATIENT DECLINED

## 2023-07-03 SDOH — ECONOMIC STABILITY: INCOME INSECURITY: HOW HARD IS IT FOR YOU TO PAY FOR THE VERY BASICS LIKE FOOD, HOUSING, MEDICAL CARE, AND HEATING?: PATIENT DECLINED

## 2023-07-03 ASSESSMENT — ENCOUNTER SYMPTOMS
WHEEZING: 0
ORTHOPNEA: 0
DIARRHEA: 0
HOARSE VOICE: 0
ABDOMINAL PAIN: 0
VISUAL CHANGE: 0
CHOKING: 0
SORE THROAT: 0
CONSTIPATION: 0
SHORTNESS OF BREATH: 0
RHINORRHEA: 0
GLOBUS SENSATION: 1
TROUBLE SWALLOWING: 1
WATER BRASH: 1
NAUSEA: 0
HAIR LOSS: 0
STRIDOR: 0
BELCHING: 1
VOMITING: 0
HEARTBURN: 0
COUGH: 0
BLURRED VISION: 0

## 2023-07-03 ASSESSMENT — PATIENT HEALTH QUESTIONNAIRE - PHQ9
5. POOR APPETITE OR OVEREATING: 0
8. MOVING OR SPEAKING SO SLOWLY THAT OTHER PEOPLE COULD HAVE NOTICED. OR THE OPPOSITE, BEING SO FIGETY OR RESTLESS THAT YOU HAVE BEEN MOVING AROUND A LOT MORE THAN USUAL: 0
7. TROUBLE CONCENTRATING ON THINGS, SUCH AS READING THE NEWSPAPER OR WATCHING TELEVISION: 0
SUM OF ALL RESPONSES TO PHQ QUESTIONS 1-9: 0
9. THOUGHTS THAT YOU WOULD BE BETTER OFF DEAD, OR OF HURTING YOURSELF: 0
2. FEELING DOWN, DEPRESSED OR HOPELESS: 0
SUM OF ALL RESPONSES TO PHQ QUESTIONS 1-9: 0
SUM OF ALL RESPONSES TO PHQ QUESTIONS 1-9: 0
6. FEELING BAD ABOUT YOURSELF - OR THAT YOU ARE A FAILURE OR HAVE LET YOURSELF OR YOUR FAMILY DOWN: 0
SUM OF ALL RESPONSES TO PHQ QUESTIONS 1-9: 0
10. IF YOU CHECKED OFF ANY PROBLEMS, HOW DIFFICULT HAVE THESE PROBLEMS MADE IT FOR YOU TO DO YOUR WORK, TAKE CARE OF THINGS AT HOME, OR GET ALONG WITH OTHER PEOPLE: 0
3. TROUBLE FALLING OR STAYING ASLEEP: 0
SUM OF ALL RESPONSES TO PHQ9 QUESTIONS 1 & 2: 0
4. FEELING TIRED OR HAVING LITTLE ENERGY: 0
1. LITTLE INTEREST OR PLEASURE IN DOING THINGS: 0

## 2023-07-03 NOTE — PATIENT INSTRUCTIONS
SURVEY:     You may be receiving a survey from [employer] regarding your visit today. Please complete the survey to enable us to provide the highest quality of care to you and your family. If you cannot score us a very good on any question, please call the office to discuss how we could have made your experience a very good one.      Thank you,    Julian Hernadez, APRN-CNP  Nick Cornell, APRN-CNP  Tex Santamaria, KELVINN  Cori Blakely, ESSENCE Morrissey, ESSENCE Coley, CMA  Claire, PCA  Patsy, PM

## 2023-10-29 DIAGNOSIS — I10 PRIMARY HYPERTENSION: ICD-10-CM

## 2023-10-30 RX ORDER — LISINOPRIL 20 MG/1
TABLET ORAL
Qty: 90 TABLET | Refills: 1 | Status: SHIPPED | OUTPATIENT
Start: 2023-10-30

## 2023-10-30 NOTE — TELEPHONE ENCOUNTER
Overweight     Dysthymia     Gastroesophageal reflux disease with esophagitis without hemorrhage     Esophageal dysphagia     Class 1 obesity due to excess calories without serious comorbidity with body mass index (BMI) of 30.0 to 30.9 in adult     Subclinical hypothyroidism

## 2024-01-18 ENCOUNTER — HOSPITAL ENCOUNTER (EMERGENCY)
Age: 44
Discharge: HOME | End: 2024-01-18
Attending: EMERGENCY MEDICINE
Payer: COMMERCIAL

## 2024-01-18 ENCOUNTER — APPOINTMENT (OUTPATIENT)
Dept: GENERAL RADIOLOGY | Age: 44
End: 2024-01-18
Payer: COMMERCIAL

## 2024-01-18 VITALS
OXYGEN SATURATION: 94 % | RESPIRATION RATE: 20 BRPM | SYSTOLIC BLOOD PRESSURE: 112 MMHG | BODY MASS INDEX: 31.89 KG/M2 | TEMPERATURE: 100.4 F | DIASTOLIC BLOOD PRESSURE: 69 MMHG | HEART RATE: 102 BPM | WEIGHT: 180 LBS

## 2024-01-18 DIAGNOSIS — U07.1 COVID-19: Primary | ICD-10-CM

## 2024-01-18 DIAGNOSIS — K20.90 ESOPHAGITIS: ICD-10-CM

## 2024-01-18 DIAGNOSIS — J06.9 UPPER RESPIRATORY TRACT INFECTION, UNSPECIFIED TYPE: ICD-10-CM

## 2024-01-18 PROCEDURE — 6370000000 HC RX 637 (ALT 250 FOR IP): Performed by: EMERGENCY MEDICINE

## 2024-01-18 PROCEDURE — 71045 X-RAY EXAM CHEST 1 VIEW: CPT

## 2024-01-18 PROCEDURE — 99283 EMERGENCY DEPT VISIT LOW MDM: CPT

## 2024-01-18 RX ORDER — ACETAMINOPHEN 500 MG
500 TABLET ORAL EVERY 6 HOURS PRN
COMMUNITY

## 2024-01-18 RX ORDER — IBUPROFEN 400 MG/1
400 TABLET ORAL EVERY 6 HOURS PRN
COMMUNITY

## 2024-01-18 RX ORDER — FAMOTIDINE 20 MG/1
20 TABLET, FILM COATED ORAL 2 TIMES DAILY
Qty: 30 TABLET | Refills: 3 | Status: SHIPPED | OUTPATIENT
Start: 2024-01-18

## 2024-01-18 RX ORDER — SUCRALFATE ORAL 1 G/10ML
1 SUSPENSION ORAL 4 TIMES DAILY
Qty: 1200 ML | Refills: 3 | Status: SHIPPED | OUTPATIENT
Start: 2024-01-18

## 2024-01-18 RX ORDER — ACETAMINOPHEN 325 MG/1
650 TABLET ORAL ONCE
Status: COMPLETED | OUTPATIENT
Start: 2024-01-18 | End: 2024-01-18

## 2024-01-18 RX ORDER — AZITHROMYCIN 250 MG/1
TABLET, FILM COATED ORAL
Qty: 1 PACKET | Refills: 0 | Status: SHIPPED | OUTPATIENT
Start: 2024-01-18 | End: 2024-01-22

## 2024-01-18 RX ADMIN — ACETAMINOPHEN 650 MG: 325 TABLET ORAL at 08:05

## 2024-01-18 RX ADMIN — ALUMINUM HYDROXIDE, MAGNESIUM HYDROXIDE, AND SIMETHICONE: 200; 200; 20 SUSPENSION ORAL at 07:50

## 2024-01-18 ASSESSMENT — LIFESTYLE VARIABLES
HOW MANY STANDARD DRINKS CONTAINING ALCOHOL DO YOU HAVE ON A TYPICAL DAY: PATIENT DOES NOT DRINK
HOW OFTEN DO YOU HAVE A DRINK CONTAINING ALCOHOL: NEVER

## 2024-01-18 ASSESSMENT — PAIN - FUNCTIONAL ASSESSMENT: PAIN_FUNCTIONAL_ASSESSMENT: 0-10

## 2024-01-18 ASSESSMENT — PAIN DESCRIPTION - DESCRIPTORS
DESCRIPTORS: ACHING
DESCRIPTORS: ACHING

## 2024-01-18 ASSESSMENT — PAIN DESCRIPTION - LOCATION
LOCATION: GENERALIZED
LOCATION: GENERALIZED

## 2024-01-18 ASSESSMENT — PAIN SCALES - GENERAL: PAINLEVEL_OUTOF10: 8

## 2024-01-18 ASSESSMENT — PAIN DESCRIPTION - PAIN TYPE: TYPE: ACUTE PAIN

## 2024-01-18 ASSESSMENT — PAIN DESCRIPTION - FREQUENCY: FREQUENCY: CONTINUOUS

## 2024-01-18 NOTE — ED NOTES
Discussed home care and s/s to return to ED. Aware that prescriptions were sent to pharmacy. Patient denies questions/concerns at this time.

## 2024-01-18 NOTE — ED PROVIDER NOTES
Resource Strain: Patient Declined (7/3/2023)    Overall Financial Resource Strain (CARDIA)     Difficulty of Paying Living Expenses: Patient declined   Transportation Needs: Unknown (7/3/2023)    PRAPARE - Transportation     Lack of Transportation (Non-Medical): Patient declined   Housing Stability: Unknown (7/3/2023)    Housing Stability Vital Sign     Unstable Housing in the Last Year: Patient refused       SCREENINGS        [place] Coma Scale  Eye Opening: Spontaneous  Best Verbal Response: Oriented  Best Motor Response: Obeys commands  Migel Coma Scale Score: 15               PHYSICAL EXAM    (up to 7 for level 4, 8 or more for level 5)     ED Triage Vitals [01/18/24 0700]   BP Temp Temp Source Pulse Respirations SpO2 Height Weight - Scale   (!) 144/78 100.4 °F (38 °C) Tympanic (!) 102 20 97 % -- 81.6 kg (180 lb)       Physical Exam  Vitals and nursing note reviewed.   Constitutional:       Comments: Febrile ill but nontoxic-appearing.  Resting comfortably and appears in no acute distress   HENT:      Head: Normocephalic and atraumatic.      Nose: Congestion present.   Eyes:      Conjunctiva/sclera: Conjunctivae normal.   Cardiovascular:      Rate and Rhythm: Normal rate and regular rhythm.   Pulmonary:      Effort: No respiratory distress.      Breath sounds: No wheezing.      Comments: Coarse breath sounds.  No wheezing.  Respirations are nonlabored.  SaO2 is 97% on room air  Abdominal:      General: There is no distension.      Palpations: Abdomen is soft.      Tenderness: There is no abdominal tenderness.   Musculoskeletal:         General: No swelling or tenderness. Normal range of motion.   Skin:     General: Skin is warm and dry.   Neurological:      General: No focal deficit present.      Mental Status: She is alert and oriented to person, place, and time.         DIAGNOSTIC RESULTS     EKG: All EKG's are interpreted by the Emergency Department Physician who either signs or Co-signs this chart in the

## 2024-01-18 NOTE — DISCHARGE INSTRUCTIONS
Tylenol as needed and directed for fever and bodyaches.  Avoid NSAIDs such as Aleve or Motrin.  Patient to drink plenty of fluids.  May use over-the-counter nasal decongestions, nasal saline spray cough drops etc. as needed for symptom relief.  Take Pepcid twice daily.  Z-Rafi as directed until complete take Carafate 4 times a day on an empty stomach-when you wake up in the morning, half an hour before lunch-or before dinner and at bedtime.  Increase fluids liquid and bland foods avoid spicy foods caffeine and acidic foods please seek medical attention immediately should you develop any worsening symptoms or any acute concerns

## 2024-05-03 DIAGNOSIS — I10 PRIMARY HYPERTENSION: ICD-10-CM

## 2024-05-03 RX ORDER — LISINOPRIL 20 MG/1
20 TABLET ORAL DAILY
Qty: 90 TABLET | Refills: 1 | Status: SHIPPED | OUTPATIENT
Start: 2024-05-03

## 2024-06-11 DIAGNOSIS — E03.8 SUBCLINICAL HYPOTHYROIDISM: ICD-10-CM

## 2024-06-11 RX ORDER — LEVOTHYROXINE SODIUM 0.07 MG/1
75 TABLET ORAL DAILY
Qty: 90 TABLET | Refills: 3 | Status: SHIPPED | OUTPATIENT
Start: 2024-06-11

## 2024-06-13 ENCOUNTER — TELEPHONE (OUTPATIENT)
Dept: PRIMARY CARE CLINIC | Age: 44
End: 2024-06-13

## 2024-06-17 ENCOUNTER — TELEPHONE (OUTPATIENT)
Dept: PRIMARY CARE CLINIC | Age: 44
End: 2024-06-17

## 2024-06-17 NOTE — TELEPHONE ENCOUNTER
Patient contacted the office in regards to going to do her labs over the weekend. Patient states that they told her it was $492 and that she is unable to afford that so she left. Patient is questioning if Ari will still see her on Thursday without her labs being done?        Please advise.

## 2024-06-20 ENCOUNTER — OFFICE VISIT (OUTPATIENT)
Dept: PRIMARY CARE CLINIC | Age: 44
End: 2024-06-20
Payer: COMMERCIAL

## 2024-06-20 VITALS
TEMPERATURE: 98.5 F | SYSTOLIC BLOOD PRESSURE: 130 MMHG | DIASTOLIC BLOOD PRESSURE: 74 MMHG | BODY MASS INDEX: 33.19 KG/M2 | RESPIRATION RATE: 18 BRPM | HEART RATE: 82 BPM | OXYGEN SATURATION: 98 % | WEIGHT: 187.3 LBS

## 2024-06-20 DIAGNOSIS — Z00.00 WELLNESS EXAMINATION: Primary | ICD-10-CM

## 2024-06-20 DIAGNOSIS — E03.8 SUBCLINICAL HYPOTHYROIDISM: ICD-10-CM

## 2024-06-20 DIAGNOSIS — I10 PRIMARY HYPERTENSION: ICD-10-CM

## 2024-06-20 PROCEDURE — 99214 OFFICE O/P EST MOD 30 MIN: CPT | Performed by: NURSE PRACTITIONER

## 2024-06-20 PROCEDURE — 3075F SYST BP GE 130 - 139MM HG: CPT | Performed by: NURSE PRACTITIONER

## 2024-06-20 PROCEDURE — 3078F DIAST BP <80 MM HG: CPT | Performed by: NURSE PRACTITIONER

## 2024-06-20 PROCEDURE — 99396 PREV VISIT EST AGE 40-64: CPT | Performed by: NURSE PRACTITIONER

## 2024-06-20 ASSESSMENT — PATIENT HEALTH QUESTIONNAIRE - PHQ9
4. FEELING TIRED OR HAVING LITTLE ENERGY: NOT AT ALL
2. FEELING DOWN, DEPRESSED OR HOPELESS: NOT AT ALL
8. MOVING OR SPEAKING SO SLOWLY THAT OTHER PEOPLE COULD HAVE NOTICED. OR THE OPPOSITE, BEING SO FIGETY OR RESTLESS THAT YOU HAVE BEEN MOVING AROUND A LOT MORE THAN USUAL: NOT AT ALL
6. FEELING BAD ABOUT YOURSELF - OR THAT YOU ARE A FAILURE OR HAVE LET YOURSELF OR YOUR FAMILY DOWN: NOT AT ALL
10. IF YOU CHECKED OFF ANY PROBLEMS, HOW DIFFICULT HAVE THESE PROBLEMS MADE IT FOR YOU TO DO YOUR WORK, TAKE CARE OF THINGS AT HOME, OR GET ALONG WITH OTHER PEOPLE: NOT DIFFICULT AT ALL
SUM OF ALL RESPONSES TO PHQ QUESTIONS 1-9: 0
3. TROUBLE FALLING OR STAYING ASLEEP: NOT AT ALL
SUM OF ALL RESPONSES TO PHQ9 QUESTIONS 1 & 2: 0
SUM OF ALL RESPONSES TO PHQ QUESTIONS 1-9: 0
7. TROUBLE CONCENTRATING ON THINGS, SUCH AS READING THE NEWSPAPER OR WATCHING TELEVISION: NOT AT ALL
9. THOUGHTS THAT YOU WOULD BE BETTER OFF DEAD, OR OF HURTING YOURSELF: NOT AT ALL
5. POOR APPETITE OR OVEREATING: NOT AT ALL
SUM OF ALL RESPONSES TO PHQ QUESTIONS 1-9: 0
1. LITTLE INTEREST OR PLEASURE IN DOING THINGS: NOT AT ALL
SUM OF ALL RESPONSES TO PHQ QUESTIONS 1-9: 0

## 2024-06-20 ASSESSMENT — ENCOUNTER SYMPTOMS
NAUSEA: 0
COUGH: 0
HAIR LOSS: 0
ORTHOPNEA: 0
WHEEZING: 0
TROUBLE SWALLOWING: 0
CONSTIPATION: 0
SORE THROAT: 0
VOMITING: 0
RHINORRHEA: 0
BLURRED VISION: 0
SHORTNESS OF BREATH: 0
HOARSE VOICE: 0
ABDOMINAL PAIN: 0
VISUAL CHANGE: 0
DIARRHEA: 0

## 2024-06-20 NOTE — PATIENT INSTRUCTIONS
SURVEY:     You may be receiving a survey from [facility] [employer] regarding your visit today.     Please complete the survey to enable us to provide the highest quality of care to you and your family.     If you cannot score us a very good on any question, please call the office to discuss how we could have made your experience a very good one.     Thank you,    Ari Hernadez, APRN-CNP  Yeni Samson, APRN-CNP  Sophia, LPN  Serina, CMA  Sixto, CMA  Vianca, CMA  Claire, PCA  Lali, CMA  Patsy, PM

## 2024-06-20 NOTE — PROGRESS NOTES
Name: Isa Conde  : 1980         Chief Complaint:     Chief Complaint   Patient presents with    Wellness Program     Routine check. No concerns.    Hypertension    Hypothyroidism       History of Present Illness:      Isa Conde is a 43 y.o.  female who presents with Wellness Program (Routine check. No concerns.), Hypertension, and Hypothyroidism      Isa is here today for a yearly wellness exam and routine office visit.    Overall she is feeling okay.  She states she has been compliant with her medication.  She is trying to exercise and eat well.  She is working full-time and taking care of her son who still lives at home with her.  See below for further comments.    Hypertension  This is a chronic problem. The current episode started more than 1 year ago. The problem is unchanged. The problem is controlled. Pertinent negatives include no anxiety, blurred vision, chest pain, headaches, malaise/fatigue, neck pain, orthopnea, palpitations, peripheral edema, PND, shortness of breath or sweats. Agents associated with hypertension include thyroid hormones. Risk factors for coronary artery disease include family history, sedentary lifestyle and stress. Past treatments include ACE inhibitors. The current treatment provides moderate improvement. Compliance problems include exercise.  There is no history of kidney disease, CAD/MI, CVA or heart failure. Identifiable causes of hypertension include a thyroid problem. There is no history of chronic renal disease.   Thyroid Problem  Presents for follow-up visit. Patient reports no anxiety, cold intolerance, constipation, depressed mood, diaphoresis, diarrhea, dry skin, fatigue, hair loss, heat intolerance, hoarse voice, leg swelling, menstrual problem, nail problem, palpitations, tremors, visual change, weight gain or weight loss. The symptoms have been stable. There is no history of heart failure.         Past Medical History:     Past Medical

## 2024-10-25 ENCOUNTER — HOSPITAL ENCOUNTER (OUTPATIENT)
Dept: WOMENS IMAGING | Age: 44
Discharge: HOME | End: 2024-10-27
Payer: COMMERCIAL

## 2024-10-25 ENCOUNTER — TELEPHONE (OUTPATIENT)
Dept: PRIMARY CARE CLINIC | Age: 44
End: 2024-10-25

## 2024-10-25 DIAGNOSIS — Z12.31 VISIT FOR SCREENING MAMMOGRAM: ICD-10-CM

## 2024-10-25 PROCEDURE — 77063 BREAST TOMOSYNTHESIS BI: CPT

## 2024-10-25 NOTE — TELEPHONE ENCOUNTER
----- Message from CLOTILDE Mallory CNP sent at 10/25/2024 10:54 AM EDT -----  Results are normal, please call patient and make them aware.

## 2024-11-11 DIAGNOSIS — I10 PRIMARY HYPERTENSION: ICD-10-CM

## 2024-11-11 RX ORDER — LISINOPRIL 20 MG/1
20 TABLET ORAL DAILY
Qty: 90 TABLET | Refills: 1 | Status: SHIPPED | OUTPATIENT
Start: 2024-11-11

## 2024-11-11 NOTE — TELEPHONE ENCOUNTER
Health Maintenance   Topic Date Due    Flu vaccine (1) Never done    COVID-19 Vaccine (3 - 2023-24 season) 09/01/2024    Hepatitis B vaccine (1 of 3 - 19+ 3-dose series) 12/20/2024 (Originally 8/24/2001)    DTaP/Tdap/Td vaccine (1 - Tdap) 06/20/2025 (Originally 8/24/2001)    Hepatitis C screen  06/20/2025 (Originally 8/24/2000)    HIV screen  06/20/2025 (Originally 8/24/1997)    Depression Monitoring  06/20/2025    Cervical cancer screen  04/20/2026    Breast cancer screen  10/25/2026    Lipids  07/01/2028    Hepatitis A vaccine  Aged Out    Hib vaccine  Aged Out    HPV vaccine  Aged Out    Polio vaccine  Aged Out    Meningococcal (ACWY) vaccine  Aged Out    Pneumococcal 0-64 years Vaccine  Aged Out    Varicella vaccine  Discontinued    Depression Screen  Discontinued             (applicable per patient's age: Cancer Screenings, Depression Screening, Fall Risk Screening, Immunizations)    AST (U/L)   Date Value   07/01/2023 20     ALT (U/L)   Date Value   07/01/2023 23     BUN (mg/dL)   Date Value   07/01/2023 14      (goal A1C is < 7)   (goal LDL is <100) need 30-50% reduction from baseline     BP Readings from Last 3 Encounters:   06/20/24 130/74   01/18/24 112/69   12/20/23 128/86    (goal /80)      All Future Testing planned in [facility]:  Lab Frequency Next Occurrence   T4, Free Once 12/01/2024   TSH Once 12/01/2024   CBC with Auto Differential Once 12/01/2024   ALT Once 12/01/2024   AST Once 12/01/2024   Basic Metabolic Panel Once 12/01/2024   Lipid Panel Once 12/01/2024       Next Visit Date:  Future Appointments   Date Time Provider Department Center   12/4/2024  4:00 PM Ari Hernadez, APRN - CNP [facility]   1/2/2025  3:45 PM Alicia Lovelace APRN - [facility]            Patient Active Problem List:     Diarrhea of infectious origin     Non-intractable vomiting with nausea     Acute nonintractable headache     Acute pharyngitis     Migraine without status migrainosus,

## 2024-11-27 ENCOUNTER — HOSPITAL ENCOUNTER (OUTPATIENT)
Age: 44
Discharge: HOME | End: 2024-11-27
Payer: COMMERCIAL

## 2024-11-27 DIAGNOSIS — I10 PRIMARY HYPERTENSION: ICD-10-CM

## 2024-11-27 DIAGNOSIS — E03.8 SUBCLINICAL HYPOTHYROIDISM: ICD-10-CM

## 2024-11-27 LAB
ALT SERPL-CCNC: 39 U/L (ref 10–35)
ANION GAP SERPL CALCULATED.3IONS-SCNC: 12 MMOL/L (ref 9–16)
AST SERPL-CCNC: 34 U/L (ref 10–35)
BASOPHILS # BLD: 0.04 K/UL (ref 0–0.2)
BASOPHILS NFR BLD: 1 % (ref 0–2)
BUN SERPL-MCNC: 13 MG/DL (ref 6–20)
BUN/CREAT SERPL: 19 (ref 9–20)
CALCIUM SERPL-MCNC: 9.5 MG/DL (ref 8.6–10.4)
CHLORIDE SERPL-SCNC: 101 MMOL/L (ref 98–107)
CHOLEST SERPL-MCNC: 226 MG/DL (ref 0–199)
CHOLESTEROL/HDL RATIO: 4
CO2 SERPL-SCNC: 24 MMOL/L (ref 20–31)
CREAT SERPL-MCNC: 0.7 MG/DL (ref 0.5–0.9)
EOSINOPHIL # BLD: 0.44 K/UL (ref 0–0.44)
EOSINOPHILS RELATIVE PERCENT: 5 % (ref 1–4)
ERYTHROCYTE [DISTWIDTH] IN BLOOD BY AUTOMATED COUNT: 12.8 % (ref 11.8–14.4)
GFR, ESTIMATED: >90 ML/MIN/1.73M2
GLUCOSE SERPL-MCNC: 90 MG/DL (ref 74–99)
HCT VFR BLD AUTO: 41.4 % (ref 36.3–47.1)
HDLC SERPL-MCNC: 57 MG/DL
HGB BLD-MCNC: 14.6 G/DL (ref 11.9–15.1)
IMM GRANULOCYTES # BLD AUTO: 0.03 K/UL (ref 0–0.3)
IMM GRANULOCYTES NFR BLD: 0 %
LDLC SERPL CALC-MCNC: 139 MG/DL (ref 0–100)
LYMPHOCYTES NFR BLD: 1.77 K/UL (ref 1.1–3.7)
LYMPHOCYTES RELATIVE PERCENT: 22 % (ref 24–43)
MCH RBC QN AUTO: 32 PG (ref 25.2–33.5)
MCHC RBC AUTO-ENTMCNC: 35.3 G/DL (ref 28.4–34.8)
MCV RBC AUTO: 90.8 FL (ref 82.6–102.9)
MONOCYTES NFR BLD: 0.68 K/UL (ref 0.1–1.2)
MONOCYTES NFR BLD: 8 % (ref 3–12)
NEUTROPHILS NFR BLD: 64 % (ref 36–65)
NEUTS SEG NFR BLD: 5.22 K/UL (ref 1.5–8.1)
NRBC BLD-RTO: 0 PER 100 WBC
PLATELET # BLD AUTO: 375 K/UL (ref 138–453)
PMV BLD AUTO: 8.8 FL (ref 8.1–13.5)
POTASSIUM SERPL-SCNC: 4 MMOL/L (ref 3.7–5.3)
RBC # BLD AUTO: 4.56 M/UL (ref 3.95–5.11)
SODIUM SERPL-SCNC: 137 MMOL/L (ref 136–145)
T4 FREE SERPL-MCNC: 1.2 NG/DL (ref 0.92–1.68)
TRIGL SERPL-MCNC: 148 MG/DL
TSH SERPL DL<=0.05 MIU/L-ACNC: 1.51 UIU/ML (ref 0.27–4.2)
VLDLC SERPL CALC-MCNC: 30 MG/DL (ref 1–30)
WBC OTHER # BLD: 8.2 K/UL (ref 3.5–11.3)

## 2024-11-27 PROCEDURE — 85025 COMPLETE CBC W/AUTO DIFF WBC: CPT

## 2024-11-27 PROCEDURE — 84439 ASSAY OF FREE THYROXINE: CPT

## 2024-11-27 PROCEDURE — 36415 COLL VENOUS BLD VENIPUNCTURE: CPT

## 2024-11-27 PROCEDURE — 84460 ALANINE AMINO (ALT) (SGPT): CPT

## 2024-11-27 PROCEDURE — 80061 LIPID PANEL: CPT

## 2024-11-27 PROCEDURE — 84443 ASSAY THYROID STIM HORMONE: CPT

## 2024-11-27 PROCEDURE — 80048 BASIC METABOLIC PNL TOTAL CA: CPT

## 2024-11-27 PROCEDURE — 84450 TRANSFERASE (AST) (SGOT): CPT

## 2024-12-04 ENCOUNTER — OFFICE VISIT (OUTPATIENT)
Dept: PRIMARY CARE CLINIC | Age: 44
End: 2024-12-04
Payer: COMMERCIAL

## 2024-12-04 VITALS
RESPIRATION RATE: 20 BRPM | TEMPERATURE: 98.5 F | HEART RATE: 78 BPM | BODY MASS INDEX: 35.15 KG/M2 | WEIGHT: 198.4 LBS | SYSTOLIC BLOOD PRESSURE: 128 MMHG | OXYGEN SATURATION: 98 % | DIASTOLIC BLOOD PRESSURE: 74 MMHG

## 2024-12-04 DIAGNOSIS — E03.8 SUBCLINICAL HYPOTHYROIDISM: ICD-10-CM

## 2024-12-04 DIAGNOSIS — I10 PRIMARY HYPERTENSION: Primary | ICD-10-CM

## 2024-12-04 DIAGNOSIS — F43.9 STRESS: ICD-10-CM

## 2024-12-04 PROBLEM — H59.89: Status: RESOLVED | Noted: 2019-02-28 | Resolved: 2024-12-04

## 2024-12-04 PROBLEM — A09 DIARRHEA OF INFECTIOUS ORIGIN: Status: RESOLVED | Noted: 2018-03-06 | Resolved: 2024-12-04

## 2024-12-04 PROBLEM — J02.9 ACUTE PHARYNGITIS: Status: RESOLVED | Noted: 2018-06-01 | Resolved: 2024-12-04

## 2024-12-04 PROBLEM — M79.10 MYALGIA: Status: RESOLVED | Noted: 2018-06-01 | Resolved: 2024-12-04

## 2024-12-04 PROBLEM — R51.9 ACUTE NONINTRACTABLE HEADACHE: Status: RESOLVED | Noted: 2018-03-06 | Resolved: 2024-12-04

## 2024-12-04 PROBLEM — B34.1 COXSACKIE VIRUS INFECTION: Status: RESOLVED | Noted: 2018-06-01 | Resolved: 2024-12-04

## 2024-12-04 PROBLEM — H57.89 IRRITATION OF LEFT EYE: Status: RESOLVED | Noted: 2018-12-30 | Resolved: 2024-12-04

## 2024-12-04 PROCEDURE — 3074F SYST BP LT 130 MM HG: CPT | Performed by: NURSE PRACTITIONER

## 2024-12-04 PROCEDURE — 3078F DIAST BP <80 MM HG: CPT | Performed by: NURSE PRACTITIONER

## 2024-12-04 PROCEDURE — 99214 OFFICE O/P EST MOD 30 MIN: CPT | Performed by: NURSE PRACTITIONER

## 2024-12-04 RX ORDER — DULOXETIN HYDROCHLORIDE 20 MG/1
20 CAPSULE, DELAYED RELEASE ORAL DAILY
Qty: 30 CAPSULE | Refills: 0 | Status: SHIPPED | OUTPATIENT
Start: 2024-12-04

## 2024-12-04 SDOH — ECONOMIC STABILITY: INCOME INSECURITY: HOW HARD IS IT FOR YOU TO PAY FOR THE VERY BASICS LIKE FOOD, HOUSING, MEDICAL CARE, AND HEATING?: PATIENT DECLINED

## 2024-12-04 SDOH — ECONOMIC STABILITY: FOOD INSECURITY: WITHIN THE PAST 12 MONTHS, THE FOOD YOU BOUGHT JUST DIDN'T LAST AND YOU DIDN'T HAVE MONEY TO GET MORE.: PATIENT DECLINED

## 2024-12-04 SDOH — ECONOMIC STABILITY: FOOD INSECURITY: WITHIN THE PAST 12 MONTHS, YOU WORRIED THAT YOUR FOOD WOULD RUN OUT BEFORE YOU GOT MONEY TO BUY MORE.: PATIENT DECLINED

## 2024-12-04 ASSESSMENT — PATIENT HEALTH QUESTIONNAIRE - PHQ9
10. IF YOU CHECKED OFF ANY PROBLEMS, HOW DIFFICULT HAVE THESE PROBLEMS MADE IT FOR YOU TO DO YOUR WORK, TAKE CARE OF THINGS AT HOME, OR GET ALONG WITH OTHER PEOPLE: NOT DIFFICULT AT ALL
2. FEELING DOWN, DEPRESSED OR HOPELESS: NOT AT ALL
SUM OF ALL RESPONSES TO PHQ QUESTIONS 1-9: 0
SUM OF ALL RESPONSES TO PHQ QUESTIONS 1-9: 0
7. TROUBLE CONCENTRATING ON THINGS, SUCH AS READING THE NEWSPAPER OR WATCHING TELEVISION: NOT AT ALL
SUM OF ALL RESPONSES TO PHQ QUESTIONS 1-9: 0
8. MOVING OR SPEAKING SO SLOWLY THAT OTHER PEOPLE COULD HAVE NOTICED. OR THE OPPOSITE, BEING SO FIGETY OR RESTLESS THAT YOU HAVE BEEN MOVING AROUND A LOT MORE THAN USUAL: NOT AT ALL
6. FEELING BAD ABOUT YOURSELF - OR THAT YOU ARE A FAILURE OR HAVE LET YOURSELF OR YOUR FAMILY DOWN: NOT AT ALL
9. THOUGHTS THAT YOU WOULD BE BETTER OFF DEAD, OR OF HURTING YOURSELF: NOT AT ALL
SUM OF ALL RESPONSES TO PHQ9 QUESTIONS 1 & 2: 0
SUM OF ALL RESPONSES TO PHQ QUESTIONS 1-9: 0
1. LITTLE INTEREST OR PLEASURE IN DOING THINGS: NOT AT ALL
5. POOR APPETITE OR OVEREATING: NOT AT ALL
3. TROUBLE FALLING OR STAYING ASLEEP: NOT AT ALL
4. FEELING TIRED OR HAVING LITTLE ENERGY: NOT AT ALL

## 2024-12-04 NOTE — PROGRESS NOTES
Name: Isa Conde  : 1980         Chief Complaint:     Chief Complaint   Patient presents with    Hypertension     Routine check.     Hypothyroidism     Does not like the way makes patient feel.       History of Present Illness:      Isa Conde is a 44 y.o.  female who presents with Hypertension (Routine check. ) and Hypothyroidism (Does not like the way makes patient feel.)      Isa is here today for a routine office visit.    She states she continues to gain weight and she believes is from drinking beer every night.  She states she does this to relax.  She is wondering if a medication for anxiety would help her.  She also states she may not take it because she does not like to take medication.  She would like to explore possibilities nonetheless.  She also states that she does not believe her thyroid medication is working.  We did discuss that she was placed on levothyroxine for a subclinical hypothyroidism and we could stop the medication if she feels it is no longer helpful.  See below for further comments.    Hypertension  This is a chronic problem. The current episode started more than 1 year ago. The problem is unchanged. The problem is controlled. Associated symptoms include anxiety. Pertinent negatives include no blurred vision, chest pain, headaches, malaise/fatigue, neck pain, orthopnea, palpitations, peripheral edema, PND, shortness of breath or sweats. Agents associated with hypertension include thyroid hormones. Risk factors for coronary artery disease include family history, sedentary lifestyle and stress. Past treatments include ACE inhibitors. The current treatment provides moderate improvement. Compliance problems include exercise.  There is no history of kidney disease, CAD/MI, CVA or heart failure. Identifiable causes of hypertension include a thyroid problem. There is no history of chronic renal disease.   Thyroid Problem  Presents for follow-up visit. Symptoms

## 2024-12-05 ASSESSMENT — ENCOUNTER SYMPTOMS
CONSTIPATION: 0
ORTHOPNEA: 0
HOARSE VOICE: 0
COUGH: 0
ABDOMINAL PAIN: 0
SORE THROAT: 0
RHINORRHEA: 0
VOMITING: 0
FEELING OF CHOKING: 0
VISUAL CHANGE: 0
HAIR LOSS: 0
HYPERVENTILATION: 0
DIARRHEA: 0
BLURRED VISION: 0
NAUSEA: 0
SHORTNESS OF BREATH: 0
WHEEZING: 0

## 2024-12-29 ENCOUNTER — HOSPITAL ENCOUNTER (EMERGENCY)
Age: 44
Discharge: HOME | End: 2024-12-29
Payer: COMMERCIAL

## 2024-12-29 VITALS
WEIGHT: 200 LBS | OXYGEN SATURATION: 96 % | TEMPERATURE: 98.9 F | HEART RATE: 106 BPM | DIASTOLIC BLOOD PRESSURE: 93 MMHG | HEIGHT: 65 IN | SYSTOLIC BLOOD PRESSURE: 156 MMHG | RESPIRATION RATE: 18 BRPM | BODY MASS INDEX: 33.32 KG/M2

## 2024-12-29 DIAGNOSIS — R11.2 NAUSEA AND VOMITING, UNSPECIFIED VOMITING TYPE: Primary | ICD-10-CM

## 2024-12-29 DIAGNOSIS — B34.9 VIRAL ILLNESS: ICD-10-CM

## 2024-12-29 LAB
ALBUMIN SERPL-MCNC: 4.8 G/DL (ref 3.5–5.2)
ALBUMIN/GLOB SERPL: 1.5 {RATIO} (ref 1–2.5)
ALP SERPL-CCNC: 86 U/L (ref 35–104)
ALT SERPL-CCNC: 62 U/L (ref 10–35)
ANION GAP SERPL CALCULATED.3IONS-SCNC: 16 MMOL/L (ref 9–16)
AST SERPL-CCNC: 45 U/L (ref 10–35)
BASOPHILS # BLD: 0 K/UL (ref 0–0.2)
BASOPHILS NFR BLD: 0 % (ref 0–2)
BILIRUB SERPL-MCNC: 0.9 MG/DL (ref 0–1.2)
BILIRUB UR QL STRIP: NEGATIVE
BUN SERPL-MCNC: 19 MG/DL (ref 6–20)
BUN/CREAT SERPL: 27 (ref 9–20)
CALCIUM SERPL-MCNC: 8.9 MG/DL (ref 8.6–10.4)
CHLORIDE SERPL-SCNC: 102 MMOL/L (ref 98–107)
CLARITY UR: CLEAR
CO2 SERPL-SCNC: 20 MMOL/L (ref 20–31)
COLOR UR: YELLOW
CREAT SERPL-MCNC: 0.7 MG/DL (ref 0.5–0.9)
EOSINOPHIL # BLD: 0.08 K/UL (ref 0–0.44)
EOSINOPHILS RELATIVE PERCENT: 1 % (ref 1–4)
EPI CELLS #/AREA URNS HPF: ABNORMAL /HPF (ref 0–25)
ERYTHROCYTE [DISTWIDTH] IN BLOOD BY AUTOMATED COUNT: 13.1 % (ref 11.8–14.4)
FLUAV AG SPEC QL: NEGATIVE
FLUBV AG SPEC QL: NEGATIVE
GFR, ESTIMATED: >90 ML/MIN/1.73M2
GLUCOSE SERPL-MCNC: 108 MG/DL (ref 74–99)
GLUCOSE UR STRIP-MCNC: NEGATIVE MG/DL
HCT VFR BLD AUTO: 46.2 % (ref 36.3–47.1)
HETEROPH AB BLD QL IA: NEGATIVE
HGB BLD-MCNC: 15.9 G/DL (ref 11.9–15.1)
HGB UR QL STRIP.AUTO: ABNORMAL
IMM GRANULOCYTES # BLD AUTO: 0 K/UL (ref 0–0.3)
IMM GRANULOCYTES NFR BLD: 0 %
KETONES UR STRIP-MCNC: NEGATIVE MG/DL
LEUKOCYTE ESTERASE UR QL STRIP: NEGATIVE
LIPASE SERPL-CCNC: 19 U/L (ref 13–60)
LYMPHOCYTES NFR BLD: 0.4 K/UL (ref 1.1–3.7)
LYMPHOCYTES RELATIVE PERCENT: 5 % (ref 24–43)
MAGNESIUM SERPL-MCNC: 2 MG/DL (ref 1.6–2.6)
MCH RBC QN AUTO: 31.5 PG (ref 25.2–33.5)
MCHC RBC AUTO-ENTMCNC: 34.4 G/DL (ref 28.4–34.8)
MCV RBC AUTO: 91.5 FL (ref 82.6–102.9)
MONOCYTES NFR BLD: 0.48 K/UL (ref 0.1–1.2)
MONOCYTES NFR BLD: 6 % (ref 3–12)
MORPHOLOGY: NORMAL
MUCOUS THREADS URNS QL MICRO: ABNORMAL
NEUTROPHILS NFR BLD: 88 % (ref 36–65)
NEUTS SEG NFR BLD: 7.04 K/UL (ref 1.5–8.1)
NITRITE UR QL STRIP: NEGATIVE
NRBC BLD-RTO: 0 PER 100 WBC
PH UR STRIP: 6 [PH] (ref 5–9)
PLATELET # BLD AUTO: 327 K/UL (ref 138–453)
PMV BLD AUTO: 8.6 FL (ref 8.1–13.5)
POTASSIUM SERPL-SCNC: 3.8 MMOL/L (ref 3.7–5.3)
PROT SERPL-MCNC: 8 G/DL (ref 6.6–8.7)
PROT UR STRIP-MCNC: ABNORMAL MG/DL
RBC # BLD AUTO: 5.05 M/UL (ref 3.95–5.11)
RBC #/AREA URNS HPF: ABNORMAL /HPF (ref 0–2)
SARS-COV-2 RDRP RESP QL NAA+PROBE: NOT DETECTED
SODIUM SERPL-SCNC: 138 MMOL/L (ref 136–145)
SP GR UR STRIP: >1.03 (ref 1.01–1.02)
SPECIMEN DESCRIPTION: NORMAL
SPECIMEN SOURCE: NORMAL
STREP A, MOLECULAR: NEGATIVE
UROBILINOGEN UR STRIP-ACNC: NORMAL EU/DL (ref 0–1)
WBC #/AREA URNS HPF: ABNORMAL /HPF (ref 0–5)
WBC OTHER # BLD: 8 K/UL (ref 3.5–11.3)

## 2024-12-29 PROCEDURE — 87635 SARS-COV-2 COVID-19 AMP PRB: CPT

## 2024-12-29 PROCEDURE — 96374 THER/PROPH/DIAG INJ IV PUSH: CPT

## 2024-12-29 PROCEDURE — 99284 EMERGENCY DEPT VISIT MOD MDM: CPT

## 2024-12-29 PROCEDURE — 87651 STREP A DNA AMP PROBE: CPT

## 2024-12-29 PROCEDURE — 85025 COMPLETE CBC W/AUTO DIFF WBC: CPT

## 2024-12-29 PROCEDURE — 96361 HYDRATE IV INFUSION ADD-ON: CPT

## 2024-12-29 PROCEDURE — 83735 ASSAY OF MAGNESIUM: CPT

## 2024-12-29 PROCEDURE — 86308 HETEROPHILE ANTIBODY SCREEN: CPT

## 2024-12-29 PROCEDURE — 81001 URINALYSIS AUTO W/SCOPE: CPT

## 2024-12-29 PROCEDURE — 6370000000 HC RX 637 (ALT 250 FOR IP): Performed by: PHYSICIAN ASSISTANT

## 2024-12-29 PROCEDURE — 6360000002 HC RX W HCPCS: Performed by: PHYSICIAN ASSISTANT

## 2024-12-29 PROCEDURE — 83690 ASSAY OF LIPASE: CPT

## 2024-12-29 PROCEDURE — 87804 INFLUENZA ASSAY W/OPTIC: CPT

## 2024-12-29 PROCEDURE — 93005 ELECTROCARDIOGRAM TRACING: CPT | Performed by: PHYSICIAN ASSISTANT

## 2024-12-29 PROCEDURE — 80053 COMPREHEN METABOLIC PANEL: CPT

## 2024-12-29 PROCEDURE — 2580000003 HC RX 258: Performed by: PHYSICIAN ASSISTANT

## 2024-12-29 RX ORDER — ONDANSETRON 2 MG/ML
4 INJECTION INTRAMUSCULAR; INTRAVENOUS ONCE
Status: COMPLETED | OUTPATIENT
Start: 2024-12-29 | End: 2024-12-29

## 2024-12-29 RX ORDER — 0.9 % SODIUM CHLORIDE 0.9 %
1000 INTRAVENOUS SOLUTION INTRAVENOUS ONCE
Status: COMPLETED | OUTPATIENT
Start: 2024-12-29 | End: 2024-12-29

## 2024-12-29 RX ORDER — ACETAMINOPHEN 325 MG/1
650 TABLET ORAL ONCE
Status: COMPLETED | OUTPATIENT
Start: 2024-12-29 | End: 2024-12-29

## 2024-12-29 RX ORDER — ONDANSETRON 4 MG/1
4 TABLET, FILM COATED ORAL 3 TIMES DAILY PRN
Qty: 15 TABLET | Refills: 0 | Status: SHIPPED | OUTPATIENT
Start: 2024-12-29

## 2024-12-29 RX ADMIN — ONDANSETRON 4 MG: 2 INJECTION, SOLUTION INTRAMUSCULAR; INTRAVENOUS at 17:33

## 2024-12-29 RX ADMIN — ACETAMINOPHEN 650 MG: 325 TABLET ORAL at 18:12

## 2024-12-29 RX ADMIN — SODIUM CHLORIDE 1000 ML: 9 INJECTION, SOLUTION INTRAVENOUS at 17:32

## 2024-12-29 ASSESSMENT — PAIN - FUNCTIONAL ASSESSMENT: PAIN_FUNCTIONAL_ASSESSMENT: 0-10

## 2024-12-29 ASSESSMENT — LIFESTYLE VARIABLES
HOW MANY STANDARD DRINKS CONTAINING ALCOHOL DO YOU HAVE ON A TYPICAL DAY: 3 OR 4
HOW OFTEN DO YOU HAVE A DRINK CONTAINING ALCOHOL: 4 OR MORE TIMES A WEEK

## 2024-12-29 ASSESSMENT — PAIN DESCRIPTION - DESCRIPTORS: DESCRIPTORS: ACHING;THROBBING

## 2024-12-29 ASSESSMENT — PAIN DESCRIPTION - LOCATION: LOCATION: GENERALIZED;HEAD

## 2024-12-29 ASSESSMENT — PAIN DESCRIPTION - PAIN TYPE: TYPE: ACUTE PAIN

## 2024-12-29 ASSESSMENT — PAIN DESCRIPTION - ONSET: ONSET: PROGRESSIVE

## 2024-12-29 ASSESSMENT — PAIN SCALES - GENERAL: PAINLEVEL_OUTOF10: 10

## 2024-12-29 ASSESSMENT — PAIN DESCRIPTION - FREQUENCY: FREQUENCY: CONTINUOUS

## 2024-12-29 NOTE — ED PROVIDER NOTES
[facility]  EMERGENCY DEPARTMENT ENCOUNTER      Pt Name: Isa Conde  MRN: [id number]  Birthdate 1980  Date of evaluation: 12/29/2024  Provider: Colton Berger PA-C  7:19 PM    CHIEF COMPLAINT       Chief Complaint   Patient presents with    Vomiting    Diarrhea    Migraine    Fever     Pt to ED from home with c/o vomiting, diarrhea, fever, and migraine.  Onset of symptoms x8 days ago.  Pt also reports fatigue and seeing black dots.  Pt took Pepto-Bismol and Tylenol (1045).  Pt unable to keep anything down.  Pt's son in contact with sick son.    Chills         HISTORY OF PRESENT ILLNESS    Isa Conde is a 44 y.o. female who presents to the emergency department with complaints of nausea vomiting and diarrhea started with a sore throat over a week ago.  She states her throat sore now from vomiting.  She been unable to keep fluids down and is feeling fatigued.  She denies chance of pregnancy as she has had tubal ligation.  She does have a child at home has been ill.     The history is provided by the patient. No  was used.       Nursing Notes were reviewed.    REVIEW OF SYSTEMS       Review of Systems    Except as noted above the remainder of the review of systems was reviewed and negative.       PAST MEDICAL HISTORY     Past Medical History:   Diagnosis Date    Esophageal stenosis     Hypertension     Thyroid disease          SURGICAL HISTORY       Past Surgical History:   Procedure Laterality Date    DILATION AND CURETTAGE OF UTERUS N/A 06/25/2021    DILATATION AND CURETTAGE HYSTEROSCOPY CAUTERY ABLATION, NOVASURE performed by Rachel Orosco DO at [facility] OR    ENDOMETRIAL ABLATION N/A 06/25/2021    ESOPHAGUS SURGERY      stretched x 8    SALPINGECTOMY Bilateral 06/25/2021    WITH DR. FAULKNER    SALPINGECTOMY Bilateral 06/25/2021    SALPINGECTOMY LAPAROSCOPIC performed by Rachel Orosco DO at [facility] OR    UPPER GASTROINTESTINAL

## 2024-12-30 LAB
EKG ATRIAL RATE: 107 BPM
EKG P AXIS: 28 DEGREES
EKG P-R INTERVAL: 142 MS
EKG Q-T INTERVAL: 342 MS
EKG QRS DURATION: 102 MS
EKG QTC CALCULATION (BAZETT): 456 MS
EKG R AXIS: -16 DEGREES
EKG T AXIS: 0 DEGREES
EKG VENTRICULAR RATE: 107 BPM

## 2024-12-30 PROCEDURE — 93010 ELECTROCARDIOGRAM REPORT: CPT | Performed by: INTERNAL MEDICINE

## 2024-12-30 NOTE — ED NOTES
Pt called on call light. This nurse responded to pts room. Pt reports \"Im ready to go. My dad has been watching my son and its been 5 hours. My dad is bringing my son to me and I have to go. I understand it takes awhile but I need to go\". Colton SCHROEDER updated. VS checked and PIV removed

## 2025-01-02 ENCOUNTER — TELEPHONE (OUTPATIENT)
Dept: OBGYN | Age: 45
End: 2025-01-02

## 2025-01-02 ENCOUNTER — OFFICE VISIT (OUTPATIENT)
Dept: OBGYN | Age: 45
End: 2025-01-02
Payer: COMMERCIAL

## 2025-01-02 VITALS
BODY MASS INDEX: 33.32 KG/M2 | DIASTOLIC BLOOD PRESSURE: 86 MMHG | WEIGHT: 200 LBS | SYSTOLIC BLOOD PRESSURE: 132 MMHG | HEIGHT: 65 IN

## 2025-01-02 DIAGNOSIS — N94.6 DYSMENORRHEA: ICD-10-CM

## 2025-01-02 DIAGNOSIS — Z79.1 NSAID LONG-TERM USE: ICD-10-CM

## 2025-01-02 DIAGNOSIS — Z01.419 ENCOUNTER FOR ANNUAL ROUTINE GYNECOLOGICAL EXAMINATION: Primary | ICD-10-CM

## 2025-01-02 PROCEDURE — 3075F SYST BP GE 130 - 139MM HG: CPT | Performed by: ADVANCED PRACTICE MIDWIFE

## 2025-01-02 PROCEDURE — 99396 PREV VISIT EST AGE 40-64: CPT | Performed by: ADVANCED PRACTICE MIDWIFE

## 2025-01-02 PROCEDURE — 3079F DIAST BP 80-89 MM HG: CPT | Performed by: ADVANCED PRACTICE MIDWIFE

## 2025-01-02 RX ORDER — NAPROXEN SODIUM 550 MG/1
550 TABLET ORAL 2 TIMES DAILY WITH MEALS
Qty: 30 TABLET | Refills: 6 | Status: SHIPPED | OUTPATIENT
Start: 2025-01-02

## 2025-01-02 ASSESSMENT — PATIENT HEALTH QUESTIONNAIRE - PHQ9
8. MOVING OR SPEAKING SO SLOWLY THAT OTHER PEOPLE COULD HAVE NOTICED. OR THE OPPOSITE, BEING SO FIGETY OR RESTLESS THAT YOU HAVE BEEN MOVING AROUND A LOT MORE THAN USUAL: NOT AT ALL
2. FEELING DOWN, DEPRESSED OR HOPELESS: NOT AT ALL
6. FEELING BAD ABOUT YOURSELF - OR THAT YOU ARE A FAILURE OR HAVE LET YOURSELF OR YOUR FAMILY DOWN: NOT AT ALL
10. IF YOU CHECKED OFF ANY PROBLEMS, HOW DIFFICULT HAVE THESE PROBLEMS MADE IT FOR YOU TO DO YOUR WORK, TAKE CARE OF THINGS AT HOME, OR GET ALONG WITH OTHER PEOPLE: NOT DIFFICULT AT ALL
1. LITTLE INTEREST OR PLEASURE IN DOING THINGS: NOT AT ALL
3. TROUBLE FALLING OR STAYING ASLEEP: NOT AT ALL
5. POOR APPETITE OR OVEREATING: NOT AT ALL
7. TROUBLE CONCENTRATING ON THINGS, SUCH AS READING THE NEWSPAPER OR WATCHING TELEVISION: NOT AT ALL
SUM OF ALL RESPONSES TO PHQ QUESTIONS 1-9: 0
9. THOUGHTS THAT YOU WOULD BE BETTER OFF DEAD, OR OF HURTING YOURSELF: NOT AT ALL
4. FEELING TIRED OR HAVING LITTLE ENERGY: NOT AT ALL
SUM OF ALL RESPONSES TO PHQ QUESTIONS 1-9: 0
SUM OF ALL RESPONSES TO PHQ QUESTIONS 1-9: 0
SUM OF ALL RESPONSES TO PHQ9 QUESTIONS 1 & 2: 0
SUM OF ALL RESPONSES TO PHQ QUESTIONS 1-9: 0

## 2025-01-02 NOTE — TELEPHONE ENCOUNTER
Lets have patient check cmp after 2 months of prn naproxen  use (due to combination with lisinopril)

## 2025-01-02 NOTE — PROGRESS NOTES
YEARLY PHYSICAL    Date of service: 2025    Isa Conde  Is a 44 y.o.  single, female    PT's PCP is: Ari Hernadez, [facility]     : 1980                                             Subjective:       Patient's last menstrual period was 2025 (exact date).     Are your menses regular: not applicable    OB History    Para Term  AB Living   1 1 1     1   SAB IAB Ectopic Molar Multiple Live Births             1      # Outcome Date GA Lbr Lei/2nd Weight Sex Type Anes PTL Lv   1 Term 16 40w0d   M Vag-Spont   CRESCENCIO        Social History     Tobacco Use   Smoking Status Former    Current packs/day: 0.00    Types: Cigarettes    Quit date: 2015    Years since quitting: 10.0   Smokeless Tobacco Never        Social History     Substance and Sexual Activity   Alcohol Use Yes    Comment: social 1-2 times weekly       Family History   Problem Relation Age of Onset    No Known Problems Mother     Cancer Father     Hypertension Father     Heart Disease Father     Hypertension Sister     Other Other         No family history of breast or ovarian cancer. Pat. uncle DVT.        Any family history of breast or ovarian cancer: No    Any family history of blood clots: No    Allergies: Latex and Ceclor [cefaclor]      Current Outpatient Medications:     naproxen sodium (ANAPROX) 550 MG tablet, Take 1 tablet by mouth 2 times daily (with meals) For 5-7 days per cycle, Disp: 30 tablet, Rfl: 6    lisinopril (PRINIVIL;ZESTRIL) 20 MG tablet, TAKE 1 TABLET BY MOUTH EVERY DAY, Disp: 90 tablet, Rfl: 1    Social History     Substance and Sexual Activity   Sexual Activity Yes    Partners: Male    Birth control/protection: Condom       Any bleeding or pain with intercourse: No    Last Yearly date:  3/1/23    Last pap date : Date of last Cervical Cancer screen (HPV or PAP): 3/1/2023     results: neg     Last HPV date and results:

## 2025-01-06 ASSESSMENT — ENCOUNTER SYMPTOMS
BACK PAIN: 0
ABDOMINAL PAIN: 0
SHORTNESS OF BREATH: 0

## 2025-02-17 ENCOUNTER — TELEPHONE (OUTPATIENT)
Dept: PRIMARY CARE CLINIC | Age: 45
End: 2025-02-17

## 2025-02-17 DIAGNOSIS — Z00.00 WELLNESS EXAMINATION: Primary | ICD-10-CM

## 2025-02-17 NOTE — TELEPHONE ENCOUNTER
Patient is requesting for lab work to be ordered before her appointment in June. I have pended the labs she would like will you please sign?  Thank you

## 2025-05-10 DIAGNOSIS — I10 PRIMARY HYPERTENSION: ICD-10-CM

## 2025-05-12 RX ORDER — LISINOPRIL 20 MG/1
20 TABLET ORAL DAILY
Qty: 90 TABLET | Refills: 3 | Status: SHIPPED | OUTPATIENT
Start: 2025-05-12

## 2025-06-18 ENCOUNTER — TELEPHONE (OUTPATIENT)
Dept: PRIMARY CARE CLINIC | Age: 45
End: 2025-06-18

## 2025-06-19 ENCOUNTER — HOSPITAL ENCOUNTER (OUTPATIENT)
Age: 45
Discharge: HOME | End: 2025-06-19
Payer: COMMERCIAL

## 2025-06-19 DIAGNOSIS — Z00.00 WELLNESS EXAMINATION: ICD-10-CM

## 2025-06-19 LAB
ALT SERPL-CCNC: 47 U/L (ref 10–35)
ANION GAP SERPL CALCULATED.3IONS-SCNC: 13 MMOL/L (ref 9–16)
AST SERPL-CCNC: 35 U/L (ref 10–35)
BASOPHILS # BLD: 0.04 K/UL (ref 0–0.2)
BASOPHILS NFR BLD: 1 % (ref 0–2)
BUN SERPL-MCNC: 13 MG/DL (ref 6–20)
BUN/CREAT SERPL: 22 (ref 9–20)
CALCIUM SERPL-MCNC: 9.3 MG/DL (ref 8.6–10.4)
CHLORIDE SERPL-SCNC: 103 MMOL/L (ref 98–107)
CHOLEST SERPL-MCNC: 215 MG/DL (ref 0–199)
CHOLESTEROL/HDL RATIO: 4.9
CO2 SERPL-SCNC: 23 MMOL/L (ref 20–31)
CREAT SERPL-MCNC: 0.6 MG/DL (ref 0.5–0.9)
EOSINOPHIL # BLD: 0.46 K/UL (ref 0–0.44)
EOSINOPHILS RELATIVE PERCENT: 6 % (ref 1–4)
ERYTHROCYTE [DISTWIDTH] IN BLOOD BY AUTOMATED COUNT: 12.8 % (ref 11.8–14.4)
GFR, ESTIMATED: >90 ML/MIN/1.73M2
GLUCOSE SERPL-MCNC: 94 MG/DL (ref 74–99)
HCT VFR BLD AUTO: 40.7 % (ref 36.3–47.1)
HDLC SERPL-MCNC: 44 MG/DL
HGB BLD-MCNC: 14 G/DL (ref 11.9–15.1)
IMM GRANULOCYTES # BLD AUTO: 0.03 K/UL (ref 0–0.3)
IMM GRANULOCYTES NFR BLD: 0 %
LDLC SERPL CALC-MCNC: 128 MG/DL (ref 0–100)
LYMPHOCYTES NFR BLD: 1.82 K/UL (ref 1.1–3.7)
LYMPHOCYTES RELATIVE PERCENT: 23 % (ref 24–43)
MCH RBC QN AUTO: 30.9 PG (ref 25.2–33.5)
MCHC RBC AUTO-ENTMCNC: 34.4 G/DL (ref 28.4–34.8)
MCV RBC AUTO: 89.8 FL (ref 82.6–102.9)
MONOCYTES NFR BLD: 0.68 K/UL (ref 0.1–1.2)
MONOCYTES NFR BLD: 9 % (ref 3–12)
NEUTROPHILS NFR BLD: 61 % (ref 36–65)
NEUTS SEG NFR BLD: 5.01 K/UL (ref 1.5–8.1)
NRBC BLD-RTO: 0 PER 100 WBC
PLATELET # BLD AUTO: 386 K/UL (ref 138–453)
PMV BLD AUTO: 8.5 FL (ref 8.1–13.5)
POTASSIUM SERPL-SCNC: 4.3 MMOL/L (ref 3.7–5.3)
RBC # BLD AUTO: 4.53 M/UL (ref 3.95–5.11)
SODIUM SERPL-SCNC: 139 MMOL/L (ref 136–145)
TRIGL SERPL-MCNC: 215 MG/DL (ref 0–149)
VLDLC SERPL CALC-MCNC: 43 MG/DL (ref 1–30)
WBC OTHER # BLD: 8 K/UL (ref 3.5–11.3)

## 2025-06-19 PROCEDURE — 84460 ALANINE AMINO (ALT) (SGPT): CPT

## 2025-06-19 PROCEDURE — 85025 COMPLETE CBC W/AUTO DIFF WBC: CPT

## 2025-06-19 PROCEDURE — 36415 COLL VENOUS BLD VENIPUNCTURE: CPT

## 2025-06-19 PROCEDURE — 84450 TRANSFERASE (AST) (SGOT): CPT

## 2025-06-19 PROCEDURE — 80048 BASIC METABOLIC PNL TOTAL CA: CPT

## 2025-06-19 PROCEDURE — 80061 LIPID PANEL: CPT

## 2025-06-25 ENCOUNTER — OFFICE VISIT (OUTPATIENT)
Dept: PRIMARY CARE CLINIC | Age: 45
End: 2025-06-25
Payer: COMMERCIAL

## 2025-06-25 VITALS
HEART RATE: 83 BPM | BODY MASS INDEX: 32.83 KG/M2 | WEIGHT: 197.3 LBS | TEMPERATURE: 97.4 F | OXYGEN SATURATION: 98 % | RESPIRATION RATE: 16 BRPM | DIASTOLIC BLOOD PRESSURE: 84 MMHG | SYSTOLIC BLOOD PRESSURE: 124 MMHG

## 2025-06-25 DIAGNOSIS — I10 PRIMARY HYPERTENSION: ICD-10-CM

## 2025-06-25 DIAGNOSIS — Z00.00 ENCOUNTER FOR WELL ADULT EXAM WITHOUT ABNORMAL FINDINGS: Primary | ICD-10-CM

## 2025-06-25 PROCEDURE — 3074F SYST BP LT 130 MM HG: CPT | Performed by: NURSE PRACTITIONER

## 2025-06-25 PROCEDURE — 99396 PREV VISIT EST AGE 40-64: CPT | Performed by: NURSE PRACTITIONER

## 2025-06-25 PROCEDURE — 3079F DIAST BP 80-89 MM HG: CPT | Performed by: NURSE PRACTITIONER

## 2025-06-25 SDOH — ECONOMIC STABILITY: FOOD INSECURITY: WITHIN THE PAST 12 MONTHS, THE FOOD YOU BOUGHT JUST DIDN'T LAST AND YOU DIDN'T HAVE MONEY TO GET MORE.: NEVER TRUE

## 2025-06-25 SDOH — ECONOMIC STABILITY: FOOD INSECURITY: WITHIN THE PAST 12 MONTHS, YOU WORRIED THAT YOUR FOOD WOULD RUN OUT BEFORE YOU GOT MONEY TO BUY MORE.: NEVER TRUE

## 2025-06-25 ASSESSMENT — ENCOUNTER SYMPTOMS
DIARRHEA: 0
CONSTIPATION: 0
VOMITING: 0
ABDOMINAL PAIN: 0
ORTHOPNEA: 0
WHEEZING: 0
COUGH: 0
SHORTNESS OF BREATH: 0
RHINORRHEA: 0
NAUSEA: 0
BLURRED VISION: 0
SORE THROAT: 0

## 2025-06-25 ASSESSMENT — PATIENT HEALTH QUESTIONNAIRE - PHQ9
5. POOR APPETITE OR OVEREATING: NOT AT ALL
2. FEELING DOWN, DEPRESSED OR HOPELESS: NOT AT ALL
3. TROUBLE FALLING OR STAYING ASLEEP: NOT AT ALL
10. IF YOU CHECKED OFF ANY PROBLEMS, HOW DIFFICULT HAVE THESE PROBLEMS MADE IT FOR YOU TO DO YOUR WORK, TAKE CARE OF THINGS AT HOME, OR GET ALONG WITH OTHER PEOPLE: NOT DIFFICULT AT ALL
6. FEELING BAD ABOUT YOURSELF - OR THAT YOU ARE A FAILURE OR HAVE LET YOURSELF OR YOUR FAMILY DOWN: NOT AT ALL
SUM OF ALL RESPONSES TO PHQ QUESTIONS 1-9: 0
SUM OF ALL RESPONSES TO PHQ QUESTIONS 1-9: 0
1. LITTLE INTEREST OR PLEASURE IN DOING THINGS: NOT AT ALL
4. FEELING TIRED OR HAVING LITTLE ENERGY: NOT AT ALL
9. THOUGHTS THAT YOU WOULD BE BETTER OFF DEAD, OR OF HURTING YOURSELF: NOT AT ALL
SUM OF ALL RESPONSES TO PHQ QUESTIONS 1-9: 0
7. TROUBLE CONCENTRATING ON THINGS, SUCH AS READING THE NEWSPAPER OR WATCHING TELEVISION: NOT AT ALL
8. MOVING OR SPEAKING SO SLOWLY THAT OTHER PEOPLE COULD HAVE NOTICED. OR THE OPPOSITE, BEING SO FIGETY OR RESTLESS THAT YOU HAVE BEEN MOVING AROUND A LOT MORE THAN USUAL: NOT AT ALL
SUM OF ALL RESPONSES TO PHQ QUESTIONS 1-9: 0

## 2025-06-25 NOTE — PATIENT INSTRUCTIONS
SURVEY:     You may be receiving a survey from [employer] regarding your visit today.     Please complete the survey to enable us to provide the highest quality of care to you and your family.     If you cannot score us a very good on any question, please call the office to discuss how we could have made your experience a very good one.     Thank you,    Ari Hernadez, APRN-CNP  Yeni Samson, APRN-CNP  Sophia, LPN  Serina, CMA  Sixto, CMA  Vianca, CMA  Claire, PCA  Lali, CMA  Patsy, PM         Well Visit, Ages 18 to 65: Care Instructions  Well visits can help you stay healthy. Your doctor has checked your overall health and may have suggested ways to take good care of yourself. Your doctor also may have recommended tests. You can help prevent illness with healthy eating, good sleep, vaccinations, regular exercise, and other steps.    Get the tests that you and your doctor decide on. Depending on your age and risks, examples might include screening for diabetes; hepatitis C; HIV; and cervical, breast, lung, and colon cancer. Screening helps find diseases before any symptoms appear.   Eat healthy foods. Choose fruits, vegetables, whole grains, lean protein, and low-fat dairy foods. Limit saturated fat and reduce salt.     Limit alcohol. Men should have no more than 2 drinks a day. Women should have no more than 1. For some people, no alcohol is the best choice.   Exercise. Get at least 30 minutes of exercise on most days of the week. Walking can be a good choice.     Reach and stay at your healthy weight. This will lower your risk for many health problems.   Take care of your mental health. Try to stay connected with friends, family, and community, and find ways to manage stress.     If you're feeling depressed or hopeless, talk to someone. A counselor can help. If you don't have a counselor, talk to your doctor.   Talk to your doctor if you think you may have a problem with alcohol or drug use. This includes

## 2025-06-25 NOTE — PROGRESS NOTES
Well Adult Note  Name: Isa Conde Today’s Date: 2025   MRN: [id number] Sex: Female   Age: 44 y.o. Ethnicity: Non- / Non    : 1980 Race: White (non-)      Isa Conde is here for a well adult exam.       Assessment & Plan   Encounter for well adult exam without abnormal findings  Primary hypertension        Return in 6 months (on 2025).       Subjective   History:    Isa is here today for a yearly wellness exam and routine office visit.    Overall she is feeling okay.  She states she has been compliant with her medication.  She is trying to exercise and eat well.  She is working full-time and taking care of her son who still lives at home with her.  See below for further comments.    Hypertension  This is a chronic problem. The current episode started more than 1 year ago. The problem is unchanged. The problem is controlled. Pertinent negatives include no anxiety, blurred vision, chest pain, headaches, malaise/fatigue, neck pain, orthopnea, palpitations, peripheral edema, PND, shortness of breath or sweats. Agents associated with hypertension include thyroid hormones. Risk factors for coronary artery disease include family history, sedentary lifestyle and stress. Past treatments include ACE inhibitors. The current treatment provides moderate improvement. Compliance problems include exercise.  There is no history of kidney disease, CAD/MI, CVA or heart failure. Identifiable causes of hypertension include a thyroid problem. There is no history of chronic renal disease.     Review of Systems   Constitutional:  Negative for chills, fatigue, fever and malaise/fatigue.   HENT:  Negative for congestion, rhinorrhea and sore throat.    Eyes:  Negative for blurred vision and visual disturbance.   Respiratory:  Negative for cough, shortness of breath and wheezing.    Cardiovascular:  Negative for chest pain, palpitations, orthopnea and PND.   Gastrointestinal:

## (undated) DEVICE — COVER LT HNDL BLU PLAS

## (undated) DEVICE — SEALER LAP L37CM MARYLAND JAW OPN NANO COAT MULTIFUNCTIONAL

## (undated) DEVICE — PAD,ABDOMINAL,8"X10",ST,LF: Brand: MEDLINE

## (undated) DEVICE — PACK,LITHOTOMY: Brand: MEDLINE

## (undated) DEVICE — SUTURE VCRL SZ 2-0 L27IN ABSRB VLT L26MM UR-6 5/8 CIR J602H

## (undated) DEVICE — UNDERPANTS INCONT XL 45-70IN KNIT SEAMLESS DSGN COLOR-CODED

## (undated) DEVICE — TOWEL,OR,DSP,ST,BLUE,STD,4/PK,20PK/CS: Brand: MEDLINE

## (undated) DEVICE — 3M™ TEGADERM™ +PAD FILM DRESSING WITH NON-ADHERENT PAD, 3587, 3-1/2 IN X 4-1/8 IN (9 CM X 10.5 CM), 25/CAR, 4 CAR/CS: Brand: 3M™ TEGADERM™

## (undated) DEVICE — SUTURE VCRL SZ 3-0 L27IN ABSRB UD L26MM SH 1/2 CIR J416H

## (undated) DEVICE — HYPODERMIC SAFETY NEEDLE: Brand: MAGELLAN

## (undated) DEVICE — PREP PAD BNS: Brand: CONVERTORS

## (undated) DEVICE — SUTURE MCRYL SZ 4-0 L27IN ABSRB UD L19MM PS-2 1/2 CIR PRIM Y426H

## (undated) DEVICE — TROCAR ENDOSCP L110MM DIA5MM STD CANN DIL BLDELSS BLNT TIP

## (undated) DEVICE — CHLORAPREP 26ML ORANGE

## (undated) DEVICE — 1000ML,PRESSURE INFUSER W/STOPCOCK: Brand: MEDLINE

## (undated) DEVICE — BLADELESS OBTURATOR: Brand: WECK VISTA

## (undated) DEVICE — [HIGH FLOW INSUFFLATOR,  DO NOT USE IF PACKAGE IS DAMAGED,  KEEP DRY,  KEEP AWAY FROM SUNLIGHT,  PROTECT FROM HEAT AND RADIOACTIVE SOURCES.]: Brand: PNEUMOSURE

## (undated) DEVICE — SOLUTION SURG PREP ANTIMICROBIAL 4 OZ SKIN WND EXIDINE

## (undated) DEVICE — Z DISCONTINUED USE 2272114 DRAPE SURG UTIL 26X15 IN W/ TAPE N INVASIVE MULTLYR DISP

## (undated) DEVICE — SYRINGE MED 10ML LUERLOCK TIP W/O SFTY DISP

## (undated) DEVICE — CANNULA SEAL

## (undated) DEVICE — Z DISCONTINUED BY MEDLINE USE 2711682 TRAY SKIN PREP DRY W/ PREM GLV

## (undated) DEVICE — GOWN,SIRUS,POLYRNF,BRTHSLV,XL,30/CS: Brand: MEDLINE

## (undated) DEVICE — GAUZE,SPONGE,4"X4",16PLY,XRAY,STRL,LF: Brand: MEDLINE

## (undated) DEVICE — GOWN,AURORA,NONRNF,XL,30/CS: Brand: MEDLINE

## (undated) DEVICE — CATHETER URETH 16FR L16IN RED RUB INTMIT ROB MOD BARDX

## (undated) DEVICE — Y-TYPE TUR/BLADDER IRRIGATION SET, REGULATING CLAMP

## (undated) DEVICE — LAVH-LF: Brand: MEDLINE INDUSTRIES, INC.

## (undated) DEVICE — PAD N ADH W3XL4IN POLY COT SFT PERF FLM EASILY CUT ABSRB

## (undated) DEVICE — WARMER SCP 2 ANTIFOG LAP DISP

## (undated) DEVICE — STRIP,CLOSURE,WOUND,MEDI-STRIP,1/2X4: Brand: MEDLINE

## (undated) DEVICE — TROCAR: Brand: KII FIOS FIRST ENTRY

## (undated) DEVICE — DRAPE SURG LITH HYSTSCP D AND C STD N FEN N REINF W FLD PCH

## (undated) DEVICE — Z DISCONTINUED NO SUB IDED DEVICE ABLAT ENDOMET UTER SOUNDING ES SURESOUND NOVASURE

## (undated) DEVICE — GLOVE SURG SZ 65 L12IN FNGR THK87MIL WHT LTX FREE

## (undated) DEVICE — TROCAR ENDOSCP L110MM DIA11MM STD CANN DIL BLDELSS BLNT TIP

## (undated) DEVICE — SOLUTION IV IRRIG POUR BRL 0.9% SODIUM CHL 2F7124

## (undated) DEVICE — SOLUTION IV 1000ML 0.9% SOD CHL FOR IRRIG PLAS CONT

## (undated) DEVICE — DEVICE MANIP L330MM DIA4.5MM UTER DISP INJ ZINNANTI KRONNER

## (undated) DEVICE — MAGNETIC IMPLANT S1000-00: Brand: SOPHONO™

## (undated) DEVICE — ELECTRODE ES AD CRD L15FT DISP FOR PT BELOW 30LB REM